# Patient Record
Sex: MALE | Race: WHITE | NOT HISPANIC OR LATINO | URBAN - METROPOLITAN AREA
[De-identification: names, ages, dates, MRNs, and addresses within clinical notes are randomized per-mention and may not be internally consistent; named-entity substitution may affect disease eponyms.]

---

## 2022-01-01 ENCOUNTER — APPOINTMENT (OUTPATIENT)
Dept: GENERAL RADIOLOGY | Facility: CLINIC | Age: 37
DRG: 003 | End: 2022-01-01
Attending: NURSE PRACTITIONER
Payer: COMMERCIAL

## 2022-01-01 ENCOUNTER — APPOINTMENT (OUTPATIENT)
Dept: CARDIOLOGY | Facility: CLINIC | Age: 37
DRG: 003 | End: 2022-01-01
Attending: INTERNAL MEDICINE
Payer: COMMERCIAL

## 2022-01-01 ENCOUNTER — APPOINTMENT (OUTPATIENT)
Dept: CT IMAGING | Facility: CLINIC | Age: 37
DRG: 003 | End: 2022-01-01
Attending: NURSE PRACTITIONER
Payer: COMMERCIAL

## 2022-01-01 ENCOUNTER — HOSPITAL ENCOUNTER (INPATIENT)
Facility: CLINIC | Age: 37
LOS: 9 days | DRG: 003 | End: 2022-04-10
Attending: INTERNAL MEDICINE | Admitting: INTERNAL MEDICINE
Payer: COMMERCIAL

## 2022-01-01 ENCOUNTER — APPOINTMENT (OUTPATIENT)
Dept: NEUROLOGY | Facility: CLINIC | Age: 37
DRG: 003 | End: 2022-01-01
Attending: NURSE PRACTITIONER
Payer: COMMERCIAL

## 2022-01-01 ENCOUNTER — APPOINTMENT (OUTPATIENT)
Dept: GENERAL RADIOLOGY | Facility: CLINIC | Age: 37
DRG: 003 | End: 2022-01-01
Attending: INTERNAL MEDICINE
Payer: COMMERCIAL

## 2022-01-01 ENCOUNTER — APPOINTMENT (OUTPATIENT)
Dept: ULTRASOUND IMAGING | Facility: CLINIC | Age: 37
DRG: 003 | End: 2022-01-01
Attending: NURSE PRACTITIONER
Payer: COMMERCIAL

## 2022-01-01 ENCOUNTER — APPOINTMENT (OUTPATIENT)
Dept: CT IMAGING | Facility: CLINIC | Age: 37
DRG: 003 | End: 2022-01-01
Attending: INTERNAL MEDICINE
Payer: COMMERCIAL

## 2022-01-01 ENCOUNTER — APPOINTMENT (OUTPATIENT)
Dept: CARDIOLOGY | Facility: CLINIC | Age: 37
DRG: 003 | End: 2022-01-01
Attending: PHYSICIAN ASSISTANT
Payer: COMMERCIAL

## 2022-01-01 ENCOUNTER — APPOINTMENT (OUTPATIENT)
Dept: ULTRASOUND IMAGING | Facility: CLINIC | Age: 37
DRG: 003 | End: 2022-01-01
Attending: INTERNAL MEDICINE
Payer: COMMERCIAL

## 2022-01-01 ENCOUNTER — APPOINTMENT (OUTPATIENT)
Dept: GENERAL RADIOLOGY | Facility: CLINIC | Age: 37
DRG: 003 | End: 2022-01-01
Payer: COMMERCIAL

## 2022-01-01 VITALS
HEART RATE: 32 BPM | OXYGEN SATURATION: 78 % | HEIGHT: 71 IN | WEIGHT: 195.11 LBS | RESPIRATION RATE: 10 BRPM | TEMPERATURE: 97.9 F | BODY MASS INDEX: 27.31 KG/M2

## 2022-01-01 DIAGNOSIS — I46.9 CARDIAC ARREST (H): ICD-10-CM

## 2022-01-01 LAB
ABO/RH(D): NORMAL
ACT BLD: 135 SECONDS (ref 74–150)
ACT BLD: 139 SECONDS (ref 74–150)
ACT BLD: 143 SECONDS (ref 74–150)
ACT BLD: 147 SECONDS (ref 74–150)
ACT BLD: 147 SECONDS (ref 74–150)
ACT BLD: 148 SECONDS (ref 74–150)
ACT BLD: 152 SECONDS (ref 74–150)
ACT BLD: 156 SECONDS (ref 74–150)
ACT BLD: 160 SECONDS (ref 74–150)
ACT BLD: 160 SECONDS (ref 74–150)
ACT BLD: 165 SECONDS (ref 74–150)
ACT BLD: 165 SECONDS (ref 74–150)
ACT BLD: 169 SECONDS (ref 74–150)
ACT BLD: 173 SECONDS (ref 74–150)
ACT BLD: 177 SECONDS (ref 74–150)
ACT BLD: 182 SECONDS (ref 74–150)
ACT BLD: 186 SECONDS (ref 74–150)
ACT BLD: 190 SECONDS (ref 74–150)
ACT BLD: 194 SECONDS (ref 74–150)
ACT BLD: 199 SECONDS (ref 74–150)
ACT BLD: 203 SECONDS (ref 74–150)
ACT BLD: 207 SECONDS (ref 74–150)
ACT BLD: 211 SECONDS (ref 74–150)
ACT BLD: 216 SECONDS (ref 74–150)
ACT BLD: 224 SECONDS (ref 74–150)
ACT BLD: 224 SECONDS (ref 74–150)
ACT BLD: 228 SECONDS (ref 74–150)
ACT BLD: 250 SECONDS (ref 74–150)
ACT BLD: 258 SECONDS (ref 74–150)
ALBUMIN SERPL-MCNC: 1.5 G/DL (ref 3.4–5)
ALBUMIN SERPL-MCNC: 1.5 G/DL (ref 3.4–5)
ALBUMIN SERPL-MCNC: 1.6 G/DL (ref 3.4–5)
ALBUMIN SERPL-MCNC: 1.7 G/DL (ref 3.4–5)
ALBUMIN SERPL-MCNC: 1.8 G/DL (ref 3.4–5)
ALBUMIN SERPL-MCNC: 1.9 G/DL (ref 3.4–5)
ALBUMIN SERPL-MCNC: 2 G/DL (ref 3.4–5)
ALBUMIN SERPL-MCNC: 2.1 G/DL (ref 3.4–5)
ALBUMIN SERPL-MCNC: 2.2 G/DL (ref 3.4–5)
ALBUMIN SERPL-MCNC: 2.2 G/DL (ref 3.4–5)
ALBUMIN SERPL-MCNC: 2.3 G/DL (ref 3.4–5)
ALBUMIN SERPL-MCNC: 2.5 G/DL (ref 3.4–5)
ALBUMIN UR-MCNC: 300 MG/DL
ALBUMIN UR-MCNC: 600 MG/DL
ALBUMIN UR-MCNC: ABNORMAL MG/DL
ALP SERPL-CCNC: 102 U/L (ref 40–150)
ALP SERPL-CCNC: 106 U/L (ref 40–150)
ALP SERPL-CCNC: 111 U/L (ref 40–150)
ALP SERPL-CCNC: 112 U/L (ref 40–150)
ALP SERPL-CCNC: 118 U/L (ref 40–150)
ALP SERPL-CCNC: 120 U/L (ref 40–150)
ALP SERPL-CCNC: 122 U/L (ref 40–150)
ALP SERPL-CCNC: 124 U/L (ref 40–150)
ALP SERPL-CCNC: 126 U/L (ref 40–150)
ALP SERPL-CCNC: 126 U/L (ref 40–150)
ALP SERPL-CCNC: 127 U/L (ref 40–150)
ALP SERPL-CCNC: 129 U/L (ref 40–150)
ALP SERPL-CCNC: 134 U/L (ref 40–150)
ALP SERPL-CCNC: 141 U/L (ref 40–150)
ALP SERPL-CCNC: 143 U/L (ref 40–150)
ALP SERPL-CCNC: 145 U/L (ref 40–150)
ALP SERPL-CCNC: 148 U/L (ref 40–150)
ALP SERPL-CCNC: 149 U/L (ref 40–150)
ALP SERPL-CCNC: 150 U/L (ref 40–150)
ALP SERPL-CCNC: 152 U/L (ref 40–150)
ALP SERPL-CCNC: 152 U/L (ref 40–150)
ALP SERPL-CCNC: 154 U/L (ref 40–150)
ALP SERPL-CCNC: 156 U/L (ref 40–150)
ALP SERPL-CCNC: 164 U/L (ref 40–150)
ALP SERPL-CCNC: 165 U/L (ref 40–150)
ALP SERPL-CCNC: 70 U/L (ref 40–150)
ALP SERPL-CCNC: 72 U/L (ref 40–150)
ALP SERPL-CCNC: 73 U/L (ref 40–150)
ALP SERPL-CCNC: 73 U/L (ref 40–150)
ALP SERPL-CCNC: 74 U/L (ref 40–150)
ALP SERPL-CCNC: 75 U/L (ref 40–150)
ALP SERPL-CCNC: 83 U/L (ref 40–150)
ALP SERPL-CCNC: 86 U/L (ref 40–150)
ALP SERPL-CCNC: 88 U/L (ref 40–150)
ALP SERPL-CCNC: 89 U/L (ref 40–150)
ALT SERPL W P-5'-P-CCNC: 1100 U/L (ref 0–70)
ALT SERPL W P-5'-P-CCNC: 1213 U/L (ref 0–70)
ALT SERPL W P-5'-P-CCNC: 1270 U/L (ref 0–70)
ALT SERPL W P-5'-P-CCNC: 1306 U/L (ref 0–70)
ALT SERPL W P-5'-P-CCNC: 1351 U/L (ref 0–70)
ALT SERPL W P-5'-P-CCNC: 1361 U/L (ref 0–70)
ALT SERPL W P-5'-P-CCNC: 1428 U/L (ref 0–70)
ALT SERPL W P-5'-P-CCNC: 1488 U/L (ref 0–70)
ALT SERPL W P-5'-P-CCNC: 153 U/L (ref 0–70)
ALT SERPL W P-5'-P-CCNC: 163 U/L (ref 0–70)
ALT SERPL W P-5'-P-CCNC: 187 U/L (ref 0–70)
ALT SERPL W P-5'-P-CCNC: 193 U/L (ref 0–70)
ALT SERPL W P-5'-P-CCNC: 200 U/L (ref 0–70)
ALT SERPL W P-5'-P-CCNC: 207 U/L (ref 0–70)
ALT SERPL W P-5'-P-CCNC: 268 U/L (ref 0–70)
ALT SERPL W P-5'-P-CCNC: 278 U/L (ref 0–70)
ALT SERPL W P-5'-P-CCNC: 307 U/L (ref 0–70)
ALT SERPL W P-5'-P-CCNC: 330 U/L (ref 0–70)
ALT SERPL W P-5'-P-CCNC: 367 U/L (ref 0–70)
ALT SERPL W P-5'-P-CCNC: 394 U/L (ref 0–70)
ALT SERPL W P-5'-P-CCNC: 437 U/L (ref 0–70)
ALT SERPL W P-5'-P-CCNC: 464 U/L (ref 0–70)
ALT SERPL W P-5'-P-CCNC: 492 U/L (ref 0–70)
ALT SERPL W P-5'-P-CCNC: 500 U/L (ref 0–70)
ALT SERPL W P-5'-P-CCNC: 533 U/L (ref 0–70)
ALT SERPL W P-5'-P-CCNC: 562 U/L (ref 0–70)
ALT SERPL W P-5'-P-CCNC: 616 U/L (ref 0–70)
ALT SERPL W P-5'-P-CCNC: 628 U/L (ref 0–70)
ALT SERPL W P-5'-P-CCNC: 656 U/L (ref 0–70)
ALT SERPL W P-5'-P-CCNC: 690 U/L (ref 0–70)
ALT SERPL W P-5'-P-CCNC: 740 U/L (ref 0–70)
ALT SERPL W P-5'-P-CCNC: 773 U/L (ref 0–70)
ALT SERPL W P-5'-P-CCNC: 822 U/L (ref 0–70)
ALT SERPL W P-5'-P-CCNC: 849 U/L (ref 0–70)
ALT SERPL W P-5'-P-CCNC: 982 U/L (ref 0–70)
AMORPH CRY #/AREA URNS HPF: ABNORMAL /HPF
AMPHETAMINES UR QL SCN: ABNORMAL
AMYLASE SERPL-CCNC: 227 U/L (ref 30–110)
AMYLASE SERPL-CCNC: 231 U/L (ref 30–110)
AMYLASE SERPL-CCNC: 254 U/L (ref 30–110)
AMYLASE SERPL-CCNC: 257 U/L (ref 30–110)
AMYLASE SERPL-CCNC: 262 U/L (ref 30–110)
AMYLASE SERPL-CCNC: 263 U/L (ref 30–110)
AMYLASE SERPL-CCNC: 286 U/L (ref 30–110)
ANION GAP SERPL CALCULATED.3IONS-SCNC: 10 MMOL/L (ref 3–14)
ANION GAP SERPL CALCULATED.3IONS-SCNC: 11 MMOL/L (ref 3–14)
ANION GAP SERPL CALCULATED.3IONS-SCNC: 12 MMOL/L (ref 3–14)
ANION GAP SERPL CALCULATED.3IONS-SCNC: 13 MMOL/L (ref 3–14)
ANION GAP SERPL CALCULATED.3IONS-SCNC: 14 MMOL/L (ref 3–14)
ANION GAP SERPL CALCULATED.3IONS-SCNC: 18 MMOL/L (ref 3–14)
ANION GAP SERPL CALCULATED.3IONS-SCNC: 28 MMOL/L (ref 3–14)
ANION GAP SERPL CALCULATED.3IONS-SCNC: 5 MMOL/L (ref 3–14)
ANION GAP SERPL CALCULATED.3IONS-SCNC: 5 MMOL/L (ref 3–14)
ANION GAP SERPL CALCULATED.3IONS-SCNC: 6 MMOL/L (ref 3–14)
ANION GAP SERPL CALCULATED.3IONS-SCNC: 6 MMOL/L (ref 3–14)
ANION GAP SERPL CALCULATED.3IONS-SCNC: 7 MMOL/L (ref 3–14)
ANION GAP SERPL CALCULATED.3IONS-SCNC: 8 MMOL/L (ref 3–14)
ANION GAP SERPL CALCULATED.3IONS-SCNC: 9 MMOL/L (ref 3–14)
ANTIBODY SCREEN: NEGATIVE
APAP SERPL-MCNC: 5 MG/L (ref 10–30)
APPEARANCE UR: ABNORMAL
APPEARANCE UR: ABNORMAL
APPEARANCE UR: CLEAR
APTT PPP: 29 SECONDS (ref 22–38)
APTT PPP: 31 SECONDS (ref 22–38)
APTT PPP: 34 SECONDS (ref 22–38)
APTT PPP: 35 SECONDS (ref 22–38)
APTT PPP: 37 SECONDS (ref 22–38)
APTT PPP: 38 SECONDS (ref 22–38)
APTT PPP: 38 SECONDS (ref 22–38)
APTT PPP: 39 SECONDS (ref 22–38)
APTT PPP: 40 SECONDS (ref 22–38)
APTT PPP: 40 SECONDS (ref 22–38)
APTT PPP: 41 SECONDS (ref 22–38)
APTT PPP: 42 SECONDS (ref 22–38)
APTT PPP: 43 SECONDS (ref 22–38)
APTT PPP: 43 SECONDS (ref 22–38)
APTT PPP: 44 SECONDS (ref 22–38)
APTT PPP: 44 SECONDS (ref 22–38)
APTT PPP: 45 SECONDS (ref 22–38)
APTT PPP: 47 SECONDS (ref 22–38)
APTT PPP: 48 SECONDS (ref 22–38)
APTT PPP: 52 SECONDS (ref 22–38)
APTT PPP: 53 SECONDS (ref 22–38)
APTT PPP: 58 SECONDS (ref 22–38)
APTT PPP: 93 SECONDS (ref 22–38)
APTT PPP: 95 SECONDS (ref 22–38)
APTT PPP: >240 SECONDS (ref 22–38)
AST SERPL W P-5'-P-CCNC: 1045 U/L (ref 0–45)
AST SERPL W P-5'-P-CCNC: 108 U/L (ref 0–45)
AST SERPL W P-5'-P-CCNC: 109 U/L (ref 0–45)
AST SERPL W P-5'-P-CCNC: 1111 U/L (ref 0–45)
AST SERPL W P-5'-P-CCNC: 113 U/L (ref 0–45)
AST SERPL W P-5'-P-CCNC: 1186 U/L (ref 0–45)
AST SERPL W P-5'-P-CCNC: 130 U/L (ref 0–45)
AST SERPL W P-5'-P-CCNC: 1375 U/L (ref 0–45)
AST SERPL W P-5'-P-CCNC: 1529 U/L (ref 0–45)
AST SERPL W P-5'-P-CCNC: 168 U/L (ref 0–45)
AST SERPL W P-5'-P-CCNC: 1704 U/L (ref 0–45)
AST SERPL W P-5'-P-CCNC: 1891 U/L (ref 0–45)
AST SERPL W P-5'-P-CCNC: 1934 U/L (ref 0–45)
AST SERPL W P-5'-P-CCNC: 194 U/L (ref 0–45)
AST SERPL W P-5'-P-CCNC: 2014 U/L (ref 0–45)
AST SERPL W P-5'-P-CCNC: 2111 U/L (ref 0–45)
AST SERPL W P-5'-P-CCNC: 2116 U/L (ref 0–45)
AST SERPL W P-5'-P-CCNC: 223 U/L (ref 0–45)
AST SERPL W P-5'-P-CCNC: 2324 U/L (ref 0–45)
AST SERPL W P-5'-P-CCNC: 254 U/L (ref 0–45)
AST SERPL W P-5'-P-CCNC: 287 U/L (ref 0–45)
AST SERPL W P-5'-P-CCNC: 345 U/L (ref 0–45)
AST SERPL W P-5'-P-CCNC: 398 U/L (ref 0–45)
AST SERPL W P-5'-P-CCNC: 444 U/L (ref 0–45)
AST SERPL W P-5'-P-CCNC: 490 U/L (ref 0–45)
AST SERPL W P-5'-P-CCNC: 559 U/L (ref 0–45)
AST SERPL W P-5'-P-CCNC: 650 U/L (ref 0–45)
AST SERPL W P-5'-P-CCNC: 652 U/L (ref 0–45)
AST SERPL W P-5'-P-CCNC: 728 U/L (ref 0–45)
AST SERPL W P-5'-P-CCNC: 819 U/L (ref 0–45)
AST SERPL W P-5'-P-CCNC: 86 U/L (ref 0–45)
AST SERPL W P-5'-P-CCNC: 893 U/L (ref 0–45)
AST SERPL W P-5'-P-CCNC: 95 U/L (ref 0–45)
AST SERPL W P-5'-P-CCNC: 983 U/L (ref 0–45)
AST SERPL W P-5'-P-CCNC: ABNORMAL U/L
AT III ACT/NOR PPP CHRO: 41 % (ref 85–135)
AT III ACT/NOR PPP CHRO: 48 % (ref 85–135)
AT III ACT/NOR PPP CHRO: 48 % (ref 85–135)
AT III ACT/NOR PPP CHRO: 54 % (ref 85–135)
AT III ACT/NOR PPP CHRO: 55 % (ref 85–135)
AT III ACT/NOR PPP CHRO: 65 % (ref 85–135)
AT III ACT/NOR PPP CHRO: 68 % (ref 85–135)
AT III ACT/NOR PPP CHRO: 77 % (ref 85–135)
AT III ACT/NOR PPP CHRO: 78 % (ref 85–135)
ATRIAL RATE - MUSE: 100 BPM
ATRIAL RATE - MUSE: 59 BPM
ATRIAL RATE - MUSE: 81 BPM
ATRIAL RATE - MUSE: 88 BPM
ATRIAL RATE - MUSE: 94 BPM
BACTERIA BLD CULT: NO GROWTH
BACTERIA SPT CULT: ABNORMAL
BARBITURATES UR QL: ABNORMAL
BASE EXCESS BLDA CALC-SCNC: -0.1 MMOL/L (ref -9–1.8)
BASE EXCESS BLDA CALC-SCNC: -0.2 MMOL/L (ref -9–1.8)
BASE EXCESS BLDA CALC-SCNC: -0.3 MMOL/L (ref -9–1.8)
BASE EXCESS BLDA CALC-SCNC: -0.4 MMOL/L (ref -9–1.8)
BASE EXCESS BLDA CALC-SCNC: -0.5 MMOL/L (ref -9–1.8)
BASE EXCESS BLDA CALC-SCNC: -0.6 MMOL/L (ref -9–1.8)
BASE EXCESS BLDA CALC-SCNC: -0.7 MMOL/L (ref -9–1.8)
BASE EXCESS BLDA CALC-SCNC: -0.8 MMOL/L (ref -9–1.8)
BASE EXCESS BLDA CALC-SCNC: -0.9 MMOL/L (ref -9–1.8)
BASE EXCESS BLDA CALC-SCNC: -0.9 MMOL/L (ref -9–1.8)
BASE EXCESS BLDA CALC-SCNC: -1.2 MMOL/L (ref -9–1.8)
BASE EXCESS BLDA CALC-SCNC: -1.2 MMOL/L (ref -9–1.8)
BASE EXCESS BLDA CALC-SCNC: -1.3 MMOL/L (ref -9–1.8)
BASE EXCESS BLDA CALC-SCNC: -1.3 MMOL/L (ref -9–1.8)
BASE EXCESS BLDA CALC-SCNC: -1.4 MMOL/L (ref -9–1.8)
BASE EXCESS BLDA CALC-SCNC: -1.5 MMOL/L (ref -9–1.8)
BASE EXCESS BLDA CALC-SCNC: -1.6 MMOL/L (ref -9–1.8)
BASE EXCESS BLDA CALC-SCNC: -1.7 MMOL/L (ref -9–1.8)
BASE EXCESS BLDA CALC-SCNC: -1.8 MMOL/L (ref -9–1.8)
BASE EXCESS BLDA CALC-SCNC: -1.9 MMOL/L (ref -9–1.8)
BASE EXCESS BLDA CALC-SCNC: -11.1 MMOL/L (ref -9–1.8)
BASE EXCESS BLDA CALC-SCNC: -11.7 MMOL/L (ref -9–1.8)
BASE EXCESS BLDA CALC-SCNC: -12 MMOL/L (ref -9–1.8)
BASE EXCESS BLDA CALC-SCNC: -12 MMOL/L (ref -9–1.8)
BASE EXCESS BLDA CALC-SCNC: -12.1 MMOL/L (ref -9–1.8)
BASE EXCESS BLDA CALC-SCNC: -12.2 MMOL/L (ref -9–1.8)
BASE EXCESS BLDA CALC-SCNC: -12.3 MMOL/L (ref -9–1.8)
BASE EXCESS BLDA CALC-SCNC: -15.4 MMOL/L (ref -9.6–2)
BASE EXCESS BLDA CALC-SCNC: -2 MMOL/L (ref -9–1.8)
BASE EXCESS BLDA CALC-SCNC: -2 MMOL/L (ref -9–1.8)
BASE EXCESS BLDA CALC-SCNC: -2.2 MMOL/L (ref -9–1.8)
BASE EXCESS BLDA CALC-SCNC: -2.2 MMOL/L (ref -9–1.8)
BASE EXCESS BLDA CALC-SCNC: -2.3 MMOL/L (ref -9–1.8)
BASE EXCESS BLDA CALC-SCNC: -2.4 MMOL/L (ref -9–1.8)
BASE EXCESS BLDA CALC-SCNC: -2.4 MMOL/L (ref -9–1.8)
BASE EXCESS BLDA CALC-SCNC: -2.5 MMOL/L (ref -9–1.8)
BASE EXCESS BLDA CALC-SCNC: -2.5 MMOL/L (ref -9–1.8)
BASE EXCESS BLDA CALC-SCNC: -2.6 MMOL/L (ref -9–1.8)
BASE EXCESS BLDA CALC-SCNC: -2.7 MMOL/L (ref -9.6–2)
BASE EXCESS BLDA CALC-SCNC: -2.8 MMOL/L (ref -9–1.8)
BASE EXCESS BLDA CALC-SCNC: -20.5 MMOL/L (ref -9.6–2)
BASE EXCESS BLDA CALC-SCNC: -3 MMOL/L (ref -9–1.8)
BASE EXCESS BLDA CALC-SCNC: -3 MMOL/L (ref -9–1.8)
BASE EXCESS BLDA CALC-SCNC: -3.2 MMOL/L (ref -9–1.8)
BASE EXCESS BLDA CALC-SCNC: -3.4 MMOL/L (ref -9–1.8)
BASE EXCESS BLDA CALC-SCNC: -3.4 MMOL/L (ref -9–1.8)
BASE EXCESS BLDA CALC-SCNC: -3.6 MMOL/L (ref -9–1.8)
BASE EXCESS BLDA CALC-SCNC: -3.7 MMOL/L (ref -9–1.8)
BASE EXCESS BLDA CALC-SCNC: -3.8 MMOL/L (ref -9–1.8)
BASE EXCESS BLDA CALC-SCNC: -3.9 MMOL/L (ref -9–1.8)
BASE EXCESS BLDA CALC-SCNC: -4.1 MMOL/L (ref -9–1.8)
BASE EXCESS BLDA CALC-SCNC: -4.3 MMOL/L (ref -9–1.8)
BASE EXCESS BLDA CALC-SCNC: -4.4 MMOL/L (ref -9–1.8)
BASE EXCESS BLDA CALC-SCNC: -4.5 MMOL/L (ref -9–1.8)
BASE EXCESS BLDA CALC-SCNC: -4.6 MMOL/L (ref -9–1.8)
BASE EXCESS BLDA CALC-SCNC: -4.6 MMOL/L (ref -9–1.8)
BASE EXCESS BLDA CALC-SCNC: -4.7 MMOL/L (ref -9–1.8)
BASE EXCESS BLDA CALC-SCNC: -4.8 MMOL/L (ref -9–1.8)
BASE EXCESS BLDA CALC-SCNC: -4.8 MMOL/L (ref -9–1.8)
BASE EXCESS BLDA CALC-SCNC: -4.9 MMOL/L (ref -9–1.8)
BASE EXCESS BLDA CALC-SCNC: -4.9 MMOL/L (ref -9–1.8)
BASE EXCESS BLDA CALC-SCNC: -5 MMOL/L (ref -9–1.8)
BASE EXCESS BLDA CALC-SCNC: -5.1 MMOL/L (ref -9–1.8)
BASE EXCESS BLDA CALC-SCNC: -5.2 MMOL/L (ref -9–1.8)
BASE EXCESS BLDA CALC-SCNC: -5.3 MMOL/L (ref -9–1.8)
BASE EXCESS BLDA CALC-SCNC: -5.5 MMOL/L (ref -9–1.8)
BASE EXCESS BLDA CALC-SCNC: -5.6 MMOL/L (ref -9–1.8)
BASE EXCESS BLDA CALC-SCNC: -5.8 MMOL/L (ref -9–1.8)
BASE EXCESS BLDA CALC-SCNC: -5.9 MMOL/L (ref -9–1.8)
BASE EXCESS BLDA CALC-SCNC: -6 MMOL/L (ref -9–1.8)
BASE EXCESS BLDA CALC-SCNC: -6 MMOL/L (ref -9–1.8)
BASE EXCESS BLDA CALC-SCNC: -6.1 MMOL/L (ref -9–1.8)
BASE EXCESS BLDA CALC-SCNC: -6.2 MMOL/L (ref -9–1.8)
BASE EXCESS BLDA CALC-SCNC: -6.3 MMOL/L (ref -9–1.8)
BASE EXCESS BLDA CALC-SCNC: -6.3 MMOL/L (ref -9–1.8)
BASE EXCESS BLDA CALC-SCNC: -6.4 MMOL/L (ref -9–1.8)
BASE EXCESS BLDA CALC-SCNC: -6.5 MMOL/L (ref -9–1.8)
BASE EXCESS BLDA CALC-SCNC: -6.5 MMOL/L (ref -9–1.8)
BASE EXCESS BLDA CALC-SCNC: -6.6 MMOL/L (ref -9–1.8)
BASE EXCESS BLDA CALC-SCNC: -6.7 MMOL/L (ref -9–1.8)
BASE EXCESS BLDA CALC-SCNC: -6.9 MMOL/L (ref -9–1.8)
BASE EXCESS BLDA CALC-SCNC: -7 MMOL/L (ref -9–1.8)
BASE EXCESS BLDA CALC-SCNC: -7.3 MMOL/L (ref -9–1.8)
BASE EXCESS BLDA CALC-SCNC: -7.4 MMOL/L (ref -9–1.8)
BASE EXCESS BLDA CALC-SCNC: -7.7 MMOL/L (ref -9–1.8)
BASE EXCESS BLDA CALC-SCNC: -7.8 MMOL/L (ref -9–1.8)
BASE EXCESS BLDA CALC-SCNC: -7.9 MMOL/L (ref -9–1.8)
BASE EXCESS BLDA CALC-SCNC: -8 MMOL/L (ref -9–1.8)
BASE EXCESS BLDA CALC-SCNC: -8.3 MMOL/L (ref -9–1.8)
BASE EXCESS BLDA CALC-SCNC: -8.5 MMOL/L (ref -9–1.8)
BASE EXCESS BLDA CALC-SCNC: -8.7 MMOL/L (ref -9.6–2)
BASE EXCESS BLDA CALC-SCNC: -8.7 MMOL/L (ref -9–1.8)
BASE EXCESS BLDA CALC-SCNC: -9.1 MMOL/L (ref -9.6–2)
BASE EXCESS BLDA CALC-SCNC: -9.9 MMOL/L (ref -9–1.8)
BASE EXCESS BLDA CALC-SCNC: 0 MMOL/L (ref -9–1.8)
BASE EXCESS BLDA CALC-SCNC: 0 MMOL/L (ref -9–1.8)
BASE EXCESS BLDA CALC-SCNC: 0.3 MMOL/L (ref -9–1.8)
BASE EXCESS BLDA CALC-SCNC: 0.3 MMOL/L (ref -9–1.8)
BASE EXCESS BLDA CALC-SCNC: 0.8 MMOL/L (ref -9–1.8)
BASE EXCESS BLDV CALC-SCNC: -0.1 MMOL/L (ref -7.7–1.9)
BASE EXCESS BLDV CALC-SCNC: -0.2 MMOL/L (ref -7.7–1.9)
BASE EXCESS BLDV CALC-SCNC: -0.4 MMOL/L (ref -7.7–1.9)
BASE EXCESS BLDV CALC-SCNC: -0.8 MMOL/L (ref -7.7–1.9)
BASE EXCESS BLDV CALC-SCNC: -0.9 MMOL/L (ref -7.7–1.9)
BASE EXCESS BLDV CALC-SCNC: -1.4 MMOL/L (ref -7.7–1.9)
BASE EXCESS BLDV CALC-SCNC: -11.4 MMOL/L (ref -7.7–1.9)
BASE EXCESS BLDV CALC-SCNC: -2.2 MMOL/L (ref -7.7–1.9)
BASE EXCESS BLDV CALC-SCNC: -2.4 MMOL/L (ref -7.7–1.9)
BASE EXCESS BLDV CALC-SCNC: -2.6 MMOL/L (ref -7.7–1.9)
BASE EXCESS BLDV CALC-SCNC: -3.1 MMOL/L (ref -7.7–1.9)
BASE EXCESS BLDV CALC-SCNC: -3.2 MMOL/L (ref -7.7–1.9)
BASE EXCESS BLDV CALC-SCNC: -3.4 MMOL/L (ref -7.7–1.9)
BASE EXCESS BLDV CALC-SCNC: -3.5 MMOL/L (ref -7.7–1.9)
BASE EXCESS BLDV CALC-SCNC: -3.5 MMOL/L (ref -7.7–1.9)
BASE EXCESS BLDV CALC-SCNC: -3.8 MMOL/L (ref -7.7–1.9)
BASE EXCESS BLDV CALC-SCNC: -4 MMOL/L (ref -7.7–1.9)
BASE EXCESS BLDV CALC-SCNC: -4 MMOL/L (ref -7.7–1.9)
BASE EXCESS BLDV CALC-SCNC: -4.1 MMOL/L (ref -7.7–1.9)
BASE EXCESS BLDV CALC-SCNC: -4.2 MMOL/L (ref -7.7–1.9)
BASE EXCESS BLDV CALC-SCNC: -4.3 MMOL/L (ref -7.7–1.9)
BASE EXCESS BLDV CALC-SCNC: -4.3 MMOL/L (ref -7.7–1.9)
BASE EXCESS BLDV CALC-SCNC: -4.4 MMOL/L (ref -7.7–1.9)
BASE EXCESS BLDV CALC-SCNC: -4.4 MMOL/L (ref -7.7–1.9)
BASE EXCESS BLDV CALC-SCNC: -4.6 MMOL/L (ref -7.7–1.9)
BASE EXCESS BLDV CALC-SCNC: -4.6 MMOL/L (ref -7.7–1.9)
BASE EXCESS BLDV CALC-SCNC: -4.7 MMOL/L (ref -7.7–1.9)
BASE EXCESS BLDV CALC-SCNC: -4.8 MMOL/L (ref -7.7–1.9)
BASE EXCESS BLDV CALC-SCNC: -4.8 MMOL/L (ref -7.7–1.9)
BASE EXCESS BLDV CALC-SCNC: -5 MMOL/L (ref -7.7–1.9)
BASE EXCESS BLDV CALC-SCNC: -5.4 MMOL/L (ref -7.7–1.9)
BASE EXCESS BLDV CALC-SCNC: -5.5 MMOL/L (ref -7.7–1.9)
BASE EXCESS BLDV CALC-SCNC: -5.6 MMOL/L (ref -7.7–1.9)
BASE EXCESS BLDV CALC-SCNC: -5.6 MMOL/L (ref -7.7–1.9)
BASE EXCESS BLDV CALC-SCNC: -5.7 MMOL/L (ref -7.7–1.9)
BASE EXCESS BLDV CALC-SCNC: -6 MMOL/L (ref -7.7–1.9)
BASE EXCESS BLDV CALC-SCNC: -6.1 MMOL/L (ref -7.7–1.9)
BASE EXCESS BLDV CALC-SCNC: -6.2 MMOL/L (ref -7.7–1.9)
BASE EXCESS BLDV CALC-SCNC: -6.3 MMOL/L (ref -7.7–1.9)
BASE EXCESS BLDV CALC-SCNC: -6.5 MMOL/L (ref -7.7–1.9)
BASE EXCESS BLDV CALC-SCNC: -6.6 MMOL/L (ref -7.7–1.9)
BASE EXCESS BLDV CALC-SCNC: -6.6 MMOL/L (ref -7.7–1.9)
BASE EXCESS BLDV CALC-SCNC: -6.7 MMOL/L (ref -7.7–1.9)
BASE EXCESS BLDV CALC-SCNC: -6.7 MMOL/L (ref -7.7–1.9)
BASE EXCESS BLDV CALC-SCNC: -7.2 MMOL/L (ref -7.7–1.9)
BASE EXCESS BLDV CALC-SCNC: -7.3 MMOL/L (ref -7.7–1.9)
BASE EXCESS BLDV CALC-SCNC: -7.5 MMOL/L (ref -7.7–1.9)
BASE EXCESS BLDV CALC-SCNC: -7.5 MMOL/L (ref -7.7–1.9)
BASE EXCESS BLDV CALC-SCNC: -7.7 MMOL/L (ref -7.7–1.9)
BASE EXCESS BLDV CALC-SCNC: -8.3 MMOL/L (ref -7.7–1.9)
BASE EXCESS BLDV CALC-SCNC: 0.4 MMOL/L (ref -7.7–1.9)
BASE EXCESS BLDV CALC-SCNC: 0.9 MMOL/L (ref -7.7–1.9)
BASOPHILS # BLD AUTO: 0 10E3/UL (ref 0–0.2)
BASOPHILS # BLD MANUAL: 0 10E3/UL (ref 0–0.2)
BASOPHILS NFR BLD AUTO: 0 %
BASOPHILS NFR BLD MANUAL: 0 %
BENZODIAZ UR QL: ABNORMAL
BILIRUB DIRECT SERPL-MCNC: 0.2 MG/DL (ref 0–0.2)
BILIRUB DIRECT SERPL-MCNC: 0.2 MG/DL (ref 0–0.2)
BILIRUB DIRECT SERPL-MCNC: 0.3 MG/DL (ref 0–0.2)
BILIRUB DIRECT SERPL-MCNC: 0.3 MG/DL (ref 0–0.2)
BILIRUB DIRECT SERPL-MCNC: 0.4 MG/DL (ref 0–0.2)
BILIRUB DIRECT SERPL-MCNC: 0.4 MG/DL (ref 0–0.2)
BILIRUB DIRECT SERPL-MCNC: 0.5 MG/DL (ref 0–0.2)
BILIRUB DIRECT SERPL-MCNC: 0.8 MG/DL (ref 0–0.2)
BILIRUB SERPL-MCNC: 0.5 MG/DL (ref 0.2–1.3)
BILIRUB SERPL-MCNC: 0.6 MG/DL (ref 0.2–1.3)
BILIRUB SERPL-MCNC: 0.7 MG/DL (ref 0.2–1.3)
BILIRUB SERPL-MCNC: 0.8 MG/DL (ref 0.2–1.3)
BILIRUB SERPL-MCNC: 0.8 MG/DL (ref 0.2–1.3)
BILIRUB SERPL-MCNC: 0.9 MG/DL (ref 0.2–1.3)
BILIRUB SERPL-MCNC: 1 MG/DL (ref 0.2–1.3)
BILIRUB SERPL-MCNC: 1.1 MG/DL (ref 0.2–1.3)
BILIRUB SERPL-MCNC: 1.2 MG/DL (ref 0.2–1.3)
BILIRUB SERPL-MCNC: 1.2 MG/DL (ref 0.2–1.3)
BILIRUB SERPL-MCNC: 1.3 MG/DL (ref 0.2–1.3)
BILIRUB SERPL-MCNC: 1.3 MG/DL (ref 0.2–1.3)
BILIRUB SERPL-MCNC: 1.4 MG/DL (ref 0.2–1.3)
BILIRUB SERPL-MCNC: 1.6 MG/DL (ref 0.2–1.3)
BILIRUB SERPL-MCNC: 1.6 MG/DL (ref 0.2–1.3)
BILIRUB SERPL-MCNC: 1.7 MG/DL (ref 0.2–1.3)
BILIRUB SERPL-MCNC: 1.7 MG/DL (ref 0.2–1.3)
BILIRUB SERPL-MCNC: 1.8 MG/DL (ref 0.2–1.3)
BILIRUB SERPL-MCNC: 2.4 MG/DL (ref 0.2–1.3)
BILIRUB UR QL STRIP: NEGATIVE
BLD PROD TYP BPU: NORMAL
BLOOD COMPONENT TYPE: NORMAL
BUN SERPL-MCNC: 101 MG/DL (ref 7–30)
BUN SERPL-MCNC: 104 MG/DL (ref 7–30)
BUN SERPL-MCNC: 108 MG/DL (ref 7–30)
BUN SERPL-MCNC: 111 MG/DL (ref 7–30)
BUN SERPL-MCNC: 114 MG/DL (ref 7–30)
BUN SERPL-MCNC: 114 MG/DL (ref 7–30)
BUN SERPL-MCNC: 14 MG/DL (ref 7–30)
BUN SERPL-MCNC: 22 MG/DL (ref 7–30)
BUN SERPL-MCNC: 30 MG/DL (ref 7–30)
BUN SERPL-MCNC: 36 MG/DL (ref 7–30)
BUN SERPL-MCNC: 36 MG/DL (ref 7–30)
BUN SERPL-MCNC: 39 MG/DL (ref 7–30)
BUN SERPL-MCNC: 41 MG/DL (ref 7–30)
BUN SERPL-MCNC: 43 MG/DL (ref 7–30)
BUN SERPL-MCNC: 47 MG/DL (ref 7–30)
BUN SERPL-MCNC: 50 MG/DL (ref 7–30)
BUN SERPL-MCNC: 50 MG/DL (ref 7–30)
BUN SERPL-MCNC: 52 MG/DL (ref 7–30)
BUN SERPL-MCNC: 52 MG/DL (ref 7–30)
BUN SERPL-MCNC: 53 MG/DL (ref 7–30)
BUN SERPL-MCNC: 55 MG/DL (ref 7–30)
BUN SERPL-MCNC: 55 MG/DL (ref 7–30)
BUN SERPL-MCNC: 56 MG/DL (ref 7–30)
BUN SERPL-MCNC: 57 MG/DL (ref 7–30)
BUN SERPL-MCNC: 58 MG/DL (ref 7–30)
BUN SERPL-MCNC: 58 MG/DL (ref 7–30)
BUN SERPL-MCNC: 60 MG/DL (ref 7–30)
BUN SERPL-MCNC: 60 MG/DL (ref 7–30)
BUN SERPL-MCNC: 62 MG/DL (ref 7–30)
BUN SERPL-MCNC: 62 MG/DL (ref 7–30)
BUN SERPL-MCNC: 64 MG/DL (ref 7–30)
BUN SERPL-MCNC: 64 MG/DL (ref 7–30)
BUN SERPL-MCNC: 65 MG/DL (ref 7–30)
BUN SERPL-MCNC: 65 MG/DL (ref 7–30)
BUN SERPL-MCNC: 67 MG/DL (ref 7–30)
BUN SERPL-MCNC: 70 MG/DL (ref 7–30)
BUN SERPL-MCNC: 72 MG/DL (ref 7–30)
BUN SERPL-MCNC: 74 MG/DL (ref 7–30)
BUN SERPL-MCNC: 74 MG/DL (ref 7–30)
BUN SERPL-MCNC: 75 MG/DL (ref 7–30)
BUN SERPL-MCNC: 75 MG/DL (ref 7–30)
BUN SERPL-MCNC: 77 MG/DL (ref 7–30)
BUN SERPL-MCNC: 80 MG/DL (ref 7–30)
BUN SERPL-MCNC: 81 MG/DL (ref 7–30)
BUN SERPL-MCNC: 81 MG/DL (ref 7–30)
BUN SERPL-MCNC: 82 MG/DL (ref 7–30)
BUN SERPL-MCNC: 83 MG/DL (ref 7–30)
BUN SERPL-MCNC: 85 MG/DL (ref 7–30)
BUN SERPL-MCNC: 86 MG/DL (ref 7–30)
BUN SERPL-MCNC: 86 MG/DL (ref 7–30)
BUN SERPL-MCNC: 88 MG/DL (ref 7–30)
BUN SERPL-MCNC: 90 MG/DL (ref 7–30)
BUN SERPL-MCNC: 91 MG/DL (ref 7–30)
BUN SERPL-MCNC: 96 MG/DL (ref 7–30)
BUN SERPL-MCNC: 98 MG/DL (ref 7–30)
BUN SERPL-MCNC: 98 MG/DL (ref 7–30)
CA-I BLD-MCNC: 3.7 MG/DL (ref 4.4–5.2)
CA-I BLD-MCNC: 3.7 MG/DL (ref 4.4–5.2)
CA-I BLD-MCNC: 3.9 MG/DL (ref 4.4–5.2)
CA-I BLD-MCNC: 4.1 MG/DL (ref 4.4–5.2)
CA-I BLD-MCNC: 4.1 MG/DL (ref 4.4–5.2)
CA-I BLD-MCNC: 4.2 MG/DL (ref 4.4–5.2)
CA-I BLD-MCNC: 4.3 MG/DL (ref 4.4–5.2)
CA-I BLD-MCNC: 4.4 MG/DL (ref 4.4–5.2)
CA-I BLD-MCNC: 4.5 MG/DL (ref 4.4–5.2)
CA-I BLD-MCNC: 4.6 MG/DL (ref 4.4–5.2)
CA-I BLD-MCNC: 4.6 MG/DL (ref 4.4–5.2)
CA-I BLD-MCNC: 5.9 MG/DL (ref 4.4–5.2)
CALCIUM SERPL-MCNC: 6.9 MG/DL (ref 8.5–10.1)
CALCIUM SERPL-MCNC: 7 MG/DL (ref 8.5–10.1)
CALCIUM SERPL-MCNC: 7 MG/DL (ref 8.5–10.1)
CALCIUM SERPL-MCNC: 7.1 MG/DL (ref 8.5–10.1)
CALCIUM SERPL-MCNC: 7.1 MG/DL (ref 8.5–10.1)
CALCIUM SERPL-MCNC: 7.2 MG/DL (ref 8.5–10.1)
CALCIUM SERPL-MCNC: 7.3 MG/DL (ref 8.5–10.1)
CALCIUM SERPL-MCNC: 7.4 MG/DL (ref 8.5–10.1)
CALCIUM SERPL-MCNC: 7.5 MG/DL (ref 8.5–10.1)
CALCIUM SERPL-MCNC: 7.6 MG/DL (ref 8.5–10.1)
CALCIUM SERPL-MCNC: 7.7 MG/DL (ref 8.5–10.1)
CALCIUM SERPL-MCNC: 7.8 MG/DL (ref 8.5–10.1)
CALCIUM SERPL-MCNC: 7.9 MG/DL (ref 8.5–10.1)
CALCIUM SERPL-MCNC: 8 MG/DL (ref 8.5–10.1)
CALCIUM SERPL-MCNC: 8.2 MG/DL (ref 8.5–10.1)
CALCIUM SERPL-MCNC: 8.4 MG/DL (ref 8.5–10.1)
CALCIUM SERPL-MCNC: 8.4 MG/DL (ref 8.5–10.1)
CANNABINOIDS UR QL SCN: ABNORMAL
CF REDUC 30M P MA P HEP LENFR BLD TEG: 0 % (ref 0–8)
CF REDUC 30M P MA P HEP LENFR BLD TEG: 0 % (ref 0–8)
CF REDUC 30M P MA P HEP LENFR BLD TEG: 28.4 % (ref 0–8)
CF REDUC 60M P MA P HEPASE LENFR BLD TEG: 0 % (ref 0–15)
CF REDUC 60M P MA P HEPASE LENFR BLD TEG: 0.5 % (ref 0–15)
CF REDUC 60M P MA P HEPASE LENFR BLD TEG: 62.4 % (ref 0–15)
CFT P HPASE BLD TEG: 0.8 MINUTE (ref 1–3)
CFT P HPASE BLD TEG: 1.2 MINUTE (ref 1–3)
CFT P HPASE BLD TEG: 1.5 MINUTE (ref 1–3)
CHLORIDE BLD-SCNC: 108 MMOL/L (ref 94–109)
CHLORIDE BLD-SCNC: 109 MMOL/L (ref 94–109)
CHLORIDE BLD-SCNC: 110 MMOL/L (ref 94–109)
CHLORIDE BLD-SCNC: 111 MMOL/L (ref 94–109)
CHLORIDE BLD-SCNC: 112 MMOL/L (ref 94–109)
CHLORIDE BLD-SCNC: 113 MMOL/L (ref 94–109)
CHLORIDE BLD-SCNC: 114 MMOL/L (ref 94–109)
CHLORIDE BLD-SCNC: 115 MMOL/L (ref 94–109)
CHLORIDE BLD-SCNC: 116 MMOL/L (ref 94–109)
CHLORIDE BLD-SCNC: 116 MMOL/L (ref 94–109)
CHLORIDE BLD-SCNC: 117 MMOL/L (ref 94–109)
CHLORIDE BLD-SCNC: 117 MMOL/L (ref 94–109)
CHLORIDE BLD-SCNC: 118 MMOL/L (ref 94–109)
CHLORIDE BLD-SCNC: 119 MMOL/L (ref 94–109)
CHLORIDE BLD-SCNC: 120 MMOL/L (ref 94–109)
CHLORIDE BLD-SCNC: 122 MMOL/L (ref 94–109)
CHLORIDE BLD-SCNC: 123 MMOL/L (ref 94–109)
CHLORIDE BLD-SCNC: 124 MMOL/L (ref 94–109)
CI (COAGULATION INDEX)(Z): 1.1 (ref -3–3)
CI (COAGULATION INDEX)(Z): 1.4 (ref -3–3)
CI (COAGULATION INDEX)(Z): 2.1 (ref -3–3)
CLOT ANGLE P HPASE BLD TEG: 70.3 DEGREES (ref 53–72)
CLOT ANGLE P HPASE BLD TEG: 72.6 DEGREES (ref 53–72)
CLOT ANGLE P HPASE BLD TEG: 75.8 DEGREES (ref 53–72)
CLOT INIT P HPASE BLD TEG: 3.8 MINUTE (ref 5–10)
CLOT INIT P HPASE BLD TEG: 5.7 MINUTE (ref 5–10)
CLOT INIT P HPASE BLD TEG: 6.8 MINUTE (ref 5–10)
CLOT STRENGTH P HPASE BLD TEG: 10.2 KD/SC (ref 4.5–11)
CLOT STRENGTH P HPASE BLD TEG: 6.5 KD/SC (ref 4.5–11)
CLOT STRENGTH P HPASE BLD TEG: 9.9 KD/SC (ref 4.5–11)
CO2 SERPL-SCNC: 12 MMOL/L (ref 20–32)
CO2 SERPL-SCNC: 16 MMOL/L (ref 20–32)
CO2 SERPL-SCNC: 17 MMOL/L (ref 20–32)
CO2 SERPL-SCNC: 18 MMOL/L (ref 20–32)
CO2 SERPL-SCNC: 19 MMOL/L (ref 20–32)
CO2 SERPL-SCNC: 20 MMOL/L (ref 20–32)
CO2 SERPL-SCNC: 21 MMOL/L (ref 20–32)
CO2 SERPL-SCNC: 22 MMOL/L (ref 20–32)
CO2 SERPL-SCNC: 23 MMOL/L (ref 20–32)
CO2 SERPL-SCNC: 24 MMOL/L (ref 20–32)
CO2 SERPL-SCNC: 25 MMOL/L (ref 20–32)
CO2 SERPL-SCNC: 26 MMOL/L (ref 20–32)
CO2 SERPL-SCNC: 26 MMOL/L (ref 20–32)
COCAINE UR QL: ABNORMAL
CODING SYSTEM: NORMAL
COLOR UR AUTO: COLORLESS
COLOR UR AUTO: YELLOW
COLOR UR AUTO: YELLOW
CORTIS SERPL-MCNC: 22.9 UG/DL (ref 4–22)
CREAT SERPL-MCNC: 1.19 MG/DL (ref 0.52–1.25)
CREAT SERPL-MCNC: 1.58 MG/DL (ref 0.52–1.25)
CREAT SERPL-MCNC: 2.03 MG/DL (ref 0.52–1.25)
CREAT SERPL-MCNC: 2.39 MG/DL (ref 0.66–1.25)
CREAT SERPL-MCNC: 2.47 MG/DL (ref 0.66–1.25)
CREAT SERPL-MCNC: 2.55 MG/DL (ref 0.66–1.25)
CREAT SERPL-MCNC: 2.62 MG/DL (ref 0.66–1.25)
CREAT SERPL-MCNC: 2.67 MG/DL (ref 0.66–1.25)
CREAT SERPL-MCNC: 2.69 MG/DL (ref 0.66–1.25)
CREAT SERPL-MCNC: 2.74 MG/DL (ref 0.66–1.25)
CREAT SERPL-MCNC: 2.75 MG/DL (ref 0.66–1.25)
CREAT SERPL-MCNC: 2.76 MG/DL (ref 0.66–1.25)
CREAT SERPL-MCNC: 2.8 MG/DL (ref 0.66–1.25)
CREAT SERPL-MCNC: 2.81 MG/DL (ref 0.66–1.25)
CREAT SERPL-MCNC: 2.84 MG/DL (ref 0.66–1.25)
CREAT SERPL-MCNC: 2.84 MG/DL (ref 0.66–1.25)
CREAT SERPL-MCNC: 2.89 MG/DL (ref 0.66–1.25)
CREAT SERPL-MCNC: 2.9 MG/DL (ref 0.66–1.25)
CREAT SERPL-MCNC: 2.93 MG/DL (ref 0.66–1.25)
CREAT SERPL-MCNC: 2.95 MG/DL (ref 0.66–1.25)
CREAT SERPL-MCNC: 2.96 MG/DL (ref 0.66–1.25)
CREAT SERPL-MCNC: 2.97 MG/DL (ref 0.66–1.25)
CREAT SERPL-MCNC: 2.97 MG/DL (ref 0.66–1.25)
CREAT SERPL-MCNC: 3 MG/DL (ref 0.66–1.25)
CREAT SERPL-MCNC: 3.06 MG/DL (ref 0.66–1.25)
CREAT SERPL-MCNC: 3.08 MG/DL (ref 0.66–1.25)
CREAT SERPL-MCNC: 3.15 MG/DL (ref 0.66–1.25)
CREAT SERPL-MCNC: 3.19 MG/DL (ref 0.66–1.25)
CREAT SERPL-MCNC: 3.21 MG/DL (ref 0.66–1.25)
CREAT SERPL-MCNC: 3.25 MG/DL (ref 0.66–1.25)
CREAT SERPL-MCNC: 3.25 MG/DL (ref 0.66–1.25)
CREAT SERPL-MCNC: 3.26 MG/DL (ref 0.66–1.25)
CREAT SERPL-MCNC: 3.32 MG/DL (ref 0.66–1.25)
CREAT SERPL-MCNC: 3.34 MG/DL (ref 0.66–1.25)
CREAT SERPL-MCNC: 3.37 MG/DL (ref 0.66–1.25)
CREAT SERPL-MCNC: 3.41 MG/DL (ref 0.66–1.25)
CREAT SERPL-MCNC: 3.41 MG/DL (ref 0.66–1.25)
CREAT SERPL-MCNC: 3.44 MG/DL (ref 0.66–1.25)
CREAT SERPL-MCNC: 3.52 MG/DL (ref 0.66–1.25)
CREAT SERPL-MCNC: 3.53 MG/DL (ref 0.66–1.25)
CREAT SERPL-MCNC: 3.61 MG/DL (ref 0.66–1.25)
CREAT SERPL-MCNC: 3.71 MG/DL (ref 0.66–1.25)
CREAT SERPL-MCNC: 3.71 MG/DL (ref 0.66–1.25)
CREAT SERPL-MCNC: 3.72 MG/DL (ref 0.66–1.25)
CREAT SERPL-MCNC: 3.79 MG/DL (ref 0.66–1.25)
CREAT SERPL-MCNC: 3.83 MG/DL (ref 0.66–1.25)
CREAT SERPL-MCNC: 3.96 MG/DL (ref 0.66–1.25)
CREAT SERPL-MCNC: 4.01 MG/DL (ref 0.66–1.25)
CREAT SERPL-MCNC: 4.01 MG/DL (ref 0.66–1.25)
CREAT SERPL-MCNC: 4.09 MG/DL (ref 0.66–1.25)
CREAT SERPL-MCNC: 4.1 MG/DL (ref 0.66–1.25)
CREAT SERPL-MCNC: 4.15 MG/DL (ref 0.66–1.25)
CREAT SERPL-MCNC: 4.26 MG/DL (ref 0.66–1.25)
CREAT SERPL-MCNC: 4.26 MG/DL (ref 0.66–1.25)
CREAT SERPL-MCNC: 4.27 MG/DL (ref 0.66–1.25)
CREAT SERPL-MCNC: 4.33 MG/DL (ref 0.66–1.25)
CREAT SERPL-MCNC: 4.37 MG/DL (ref 0.66–1.25)
CREAT SERPL-MCNC: 4.46 MG/DL (ref 0.66–1.25)
CREAT SERPL-MCNC: 4.5 MG/DL (ref 0.66–1.25)
CREAT SERPL-MCNC: 4.53 MG/DL (ref 0.66–1.25)
CREAT SERPL-MCNC: 4.68 MG/DL (ref 0.66–1.25)
CREAT SERPL-MCNC: 4.68 MG/DL (ref 0.66–1.25)
CREAT SERPL-MCNC: 4.76 MG/DL (ref 0.66–1.25)
CREAT SERPL-MCNC: 4.9 MG/DL (ref 0.66–1.25)
CREAT SERPL-MCNC: 4.92 MG/DL (ref 0.66–1.25)
CREAT SERPL-MCNC: 4.95 MG/DL (ref 0.66–1.25)
CREAT SERPL-MCNC: 5.14 MG/DL (ref 0.66–1.25)
CREAT SERPL-MCNC: 5.23 MG/DL (ref 0.66–1.25)
CREAT SERPL-MCNC: 5.28 MG/DL (ref 0.66–1.25)
CREAT SERPL-MCNC: 5.45 MG/DL (ref 0.66–1.25)
CROSSMATCH: NORMAL
CRP SERPL-MCNC: 13 MG/L (ref 0–8)
CRP SERPL-MCNC: 170 MG/L (ref 0–8)
CRP SERPL-MCNC: 190 MG/L (ref 0–8)
CRP SERPL-MCNC: 33 MG/L (ref 0–8)
CRP SERPL-MCNC: 4.3 MG/L (ref 0–8)
CRP SERPL-MCNC: 42 MG/L (ref 0–8)
CRP SERPL-MCNC: 50 MG/L (ref 0–8)
CRP SERPL-MCNC: 65 MG/L (ref 0–8)
CRP SERPL-MCNC: 94 MG/L (ref 0–8)
CRP SERPL-MCNC: 97 MG/L (ref 0–8)
D DIMER PPP FEU-MCNC: 0.46 UG/ML FEU (ref 0–0.5)
D DIMER PPP FEU-MCNC: 15.32 UG/ML FEU (ref 0–0.5)
D DIMER PPP FEU-MCNC: 3.28 UG/ML FEU (ref 0–0.5)
D DIMER PPP FEU-MCNC: 3.5 UG/ML FEU (ref 0–0.5)
D DIMER PPP FEU-MCNC: 3.57 UG/ML FEU (ref 0–0.5)
D DIMER PPP FEU-MCNC: 3.67 UG/ML FEU (ref 0–0.5)
D DIMER PPP FEU-MCNC: 4.36 UG/ML FEU (ref 0–0.5)
D DIMER PPP FEU-MCNC: 5.69 UG/ML FEU (ref 0–0.5)
D DIMER PPP FEU-MCNC: 6.09 UG/ML FEU (ref 0–0.5)
D DIMER PPP FEU-MCNC: 6.35 UG/ML FEU (ref 0–0.5)
D DIMER PPP FEU-MCNC: 7.29 UG/ML FEU (ref 0–0.5)
D DIMER PPP FEU-MCNC: 9.78 UG/ML FEU (ref 0–0.5)
D DIMER PPP FEU-MCNC: >20 UG/ML FEU (ref 0–0.5)
DIASTOLIC BLOOD PRESSURE - MUSE: NORMAL MMHG
EOSINOPHIL # BLD AUTO: 0 10E3/UL (ref 0–0.7)
EOSINOPHIL # BLD MANUAL: 0 10E3/UL (ref 0–0.7)
EOSINOPHIL NFR BLD AUTO: 0 %
EOSINOPHIL NFR BLD MANUAL: 0 %
ERYTHROCYTE [DISTWIDTH] IN BLOOD BY AUTOMATED COUNT: 13.1 % (ref 10–15)
ERYTHROCYTE [DISTWIDTH] IN BLOOD BY AUTOMATED COUNT: 13.3 % (ref 10–15)
ERYTHROCYTE [DISTWIDTH] IN BLOOD BY AUTOMATED COUNT: 13.3 % (ref 10–15)
ERYTHROCYTE [DISTWIDTH] IN BLOOD BY AUTOMATED COUNT: 13.5 % (ref 10–15)
ERYTHROCYTE [DISTWIDTH] IN BLOOD BY AUTOMATED COUNT: 13.8 % (ref 10–15)
ERYTHROCYTE [DISTWIDTH] IN BLOOD BY AUTOMATED COUNT: 13.9 % (ref 10–15)
ERYTHROCYTE [DISTWIDTH] IN BLOOD BY AUTOMATED COUNT: 14 % (ref 10–15)
ERYTHROCYTE [DISTWIDTH] IN BLOOD BY AUTOMATED COUNT: 14.4 % (ref 10–15)
ERYTHROCYTE [DISTWIDTH] IN BLOOD BY AUTOMATED COUNT: 14.6 % (ref 10–15)
ERYTHROCYTE [DISTWIDTH] IN BLOOD BY AUTOMATED COUNT: 14.7 % (ref 10–15)
ERYTHROCYTE [DISTWIDTH] IN BLOOD BY AUTOMATED COUNT: 14.8 % (ref 10–15)
ERYTHROCYTE [DISTWIDTH] IN BLOOD BY AUTOMATED COUNT: 14.9 % (ref 10–15)
ERYTHROCYTE [DISTWIDTH] IN BLOOD BY AUTOMATED COUNT: 15 % (ref 10–15)
ERYTHROCYTE [DISTWIDTH] IN BLOOD BY AUTOMATED COUNT: 15.9 % (ref 10–15)
ERYTHROCYTE [DISTWIDTH] IN BLOOD BY AUTOMATED COUNT: 16.1 % (ref 10–15)
ERYTHROCYTE [DISTWIDTH] IN BLOOD BY AUTOMATED COUNT: 17.2 % (ref 10–15)
ERYTHROCYTE [DISTWIDTH] IN BLOOD BY AUTOMATED COUNT: 17.7 % (ref 10–15)
ERYTHROCYTE [DISTWIDTH] IN BLOOD BY AUTOMATED COUNT: 18.1 % (ref 10–15)
ERYTHROCYTE [SEDIMENTATION RATE] IN BLOOD BY WESTERGREN METHOD: 10 MM/HR (ref 0–15)
ERYTHROCYTE [SEDIMENTATION RATE] IN BLOOD BY WESTERGREN METHOD: 101 MM/HR (ref 0–15)
ERYTHROCYTE [SEDIMENTATION RATE] IN BLOOD BY WESTERGREN METHOD: 36 MM/HR (ref 0–15)
ERYTHROCYTE [SEDIMENTATION RATE] IN BLOOD BY WESTERGREN METHOD: 4 MM/HR (ref 0–30)
ERYTHROCYTE [SEDIMENTATION RATE] IN BLOOD BY WESTERGREN METHOD: 41 MM/HR (ref 0–15)
ERYTHROCYTE [SEDIMENTATION RATE] IN BLOOD BY WESTERGREN METHOD: 5 MM/HR (ref 0–30)
ERYTHROCYTE [SEDIMENTATION RATE] IN BLOOD BY WESTERGREN METHOD: 54 MM/HR (ref 0–15)
ERYTHROCYTE [SEDIMENTATION RATE] IN BLOOD BY WESTERGREN METHOD: 85 MM/HR (ref 0–15)
ERYTHROCYTE [SEDIMENTATION RATE] IN BLOOD BY WESTERGREN METHOD: 90 MM/HR (ref 0–15)
ERYTHROCYTE [SEDIMENTATION RATE] IN BLOOD BY WESTERGREN METHOD: 93 MM/HR (ref 0–15)
ETHANOL UR QL SCN: ABNORMAL
FIBRINOGEN PPP-MCNC: 159 MG/DL (ref 170–490)
FIBRINOGEN PPP-MCNC: 199 MG/DL (ref 170–490)
FIBRINOGEN PPP-MCNC: 260 MG/DL (ref 170–490)
FIBRINOGEN PPP-MCNC: 291 MG/DL (ref 170–490)
FIBRINOGEN PPP-MCNC: 312 MG/DL (ref 170–490)
FIBRINOGEN PPP-MCNC: 341 MG/DL (ref 170–490)
FIBRINOGEN PPP-MCNC: 472 MG/DL (ref 170–490)
FIBRINOGEN PPP-MCNC: 495 MG/DL (ref 170–490)
FIBRINOGEN PPP-MCNC: 557 MG/DL (ref 170–490)
FIBRINOGEN PPP-MCNC: 607 MG/DL (ref 170–490)
FIBRINOGEN PPP-MCNC: 636 MG/DL (ref 170–490)
FIBRINOGEN PPP-MCNC: 648 MG/DL (ref 170–490)
FIBRINOGEN PPP-MCNC: 653 MG/DL (ref 170–490)
FIBRINOGEN PPP-MCNC: 655 MG/DL (ref 170–490)
FIBRINOGEN PPP-MCNC: 683 MG/DL (ref 170–490)
FIBRINOGEN PPP-MCNC: 697 MG/DL (ref 170–490)
FIBRINOGEN PPP-MCNC: 707 MG/DL (ref 170–490)
FIBRINOGEN PPP-MCNC: >1200 MG/DL (ref 170–490)
GFR SERPL CREATININE-BSD FRML MDRD: 13 ML/MIN/1.73M2
GFR SERPL CREATININE-BSD FRML MDRD: 14 ML/MIN/1.73M2
GFR SERPL CREATININE-BSD FRML MDRD: 15 ML/MIN/1.73M2
GFR SERPL CREATININE-BSD FRML MDRD: 16 ML/MIN/1.73M2
GFR SERPL CREATININE-BSD FRML MDRD: 17 ML/MIN/1.73M2
GFR SERPL CREATININE-BSD FRML MDRD: 18 ML/MIN/1.73M2
GFR SERPL CREATININE-BSD FRML MDRD: 19 ML/MIN/1.73M2
GFR SERPL CREATININE-BSD FRML MDRD: 20 ML/MIN/1.73M2
GFR SERPL CREATININE-BSD FRML MDRD: 20 ML/MIN/1.73M2
GFR SERPL CREATININE-BSD FRML MDRD: 21 ML/MIN/1.73M2
GFR SERPL CREATININE-BSD FRML MDRD: 22 ML/MIN/1.73M2
GFR SERPL CREATININE-BSD FRML MDRD: 23 ML/MIN/1.73M2
GFR SERPL CREATININE-BSD FRML MDRD: 24 ML/MIN/1.73M2
GFR SERPL CREATININE-BSD FRML MDRD: 25 ML/MIN/1.73M2
GFR SERPL CREATININE-BSD FRML MDRD: 26 ML/MIN/1.73M2
GFR SERPL CREATININE-BSD FRML MDRD: 27 ML/MIN/1.73M2
GFR SERPL CREATININE-BSD FRML MDRD: 28 ML/MIN/1.73M2
GFR SERPL CREATININE-BSD FRML MDRD: 29 ML/MIN/1.73M2
GFR SERPL CREATININE-BSD FRML MDRD: 30 ML/MIN/1.73M2
GFR SERPL CREATININE-BSD FRML MDRD: 31 ML/MIN/1.73M2
GFR SERPL CREATININE-BSD FRML MDRD: 31 ML/MIN/1.73M2
GFR SERPL CREATININE-BSD FRML MDRD: 32 ML/MIN/1.73M2
GFR SERPL CREATININE-BSD FRML MDRD: 33 ML/MIN/1.73M2
GFR SERPL CREATININE-BSD FRML MDRD: 34 ML/MIN/1.73M2
GFR SERPL CREATININE-BSD FRML MDRD: 36 ML/MIN/1.73M2
GFR SERPL CREATININE-BSD FRML MDRD: ABNORMAL ML/MIN/{1.73_M2}
GLUCOSE BLD-MCNC: 100 MG/DL (ref 70–99)
GLUCOSE BLD-MCNC: 101 MG/DL (ref 70–99)
GLUCOSE BLD-MCNC: 101 MG/DL (ref 70–99)
GLUCOSE BLD-MCNC: 102 MG/DL (ref 70–99)
GLUCOSE BLD-MCNC: 102 MG/DL (ref 70–99)
GLUCOSE BLD-MCNC: 103 MG/DL (ref 70–99)
GLUCOSE BLD-MCNC: 105 MG/DL (ref 70–99)
GLUCOSE BLD-MCNC: 105 MG/DL (ref 70–99)
GLUCOSE BLD-MCNC: 106 MG/DL (ref 70–99)
GLUCOSE BLD-MCNC: 107 MG/DL (ref 70–99)
GLUCOSE BLD-MCNC: 107 MG/DL (ref 70–99)
GLUCOSE BLD-MCNC: 108 MG/DL (ref 70–99)
GLUCOSE BLD-MCNC: 110 MG/DL (ref 70–99)
GLUCOSE BLD-MCNC: 110 MG/DL (ref 70–99)
GLUCOSE BLD-MCNC: 112 MG/DL (ref 70–99)
GLUCOSE BLD-MCNC: 116 MG/DL (ref 70–99)
GLUCOSE BLD-MCNC: 117 MG/DL (ref 70–99)
GLUCOSE BLD-MCNC: 118 MG/DL (ref 70–99)
GLUCOSE BLD-MCNC: 118 MG/DL (ref 70–99)
GLUCOSE BLD-MCNC: 119 MG/DL (ref 70–99)
GLUCOSE BLD-MCNC: 120 MG/DL (ref 70–99)
GLUCOSE BLD-MCNC: 120 MG/DL (ref 70–99)
GLUCOSE BLD-MCNC: 121 MG/DL (ref 70–99)
GLUCOSE BLD-MCNC: 122 MG/DL (ref 70–99)
GLUCOSE BLD-MCNC: 123 MG/DL (ref 70–99)
GLUCOSE BLD-MCNC: 124 MG/DL (ref 70–99)
GLUCOSE BLD-MCNC: 125 MG/DL (ref 70–99)
GLUCOSE BLD-MCNC: 126 MG/DL (ref 70–99)
GLUCOSE BLD-MCNC: 128 MG/DL (ref 70–99)
GLUCOSE BLD-MCNC: 129 MG/DL (ref 70–99)
GLUCOSE BLD-MCNC: 132 MG/DL (ref 70–99)
GLUCOSE BLD-MCNC: 133 MG/DL (ref 70–99)
GLUCOSE BLD-MCNC: 134 MG/DL (ref 70–99)
GLUCOSE BLD-MCNC: 135 MG/DL (ref 70–99)
GLUCOSE BLD-MCNC: 137 MG/DL (ref 70–99)
GLUCOSE BLD-MCNC: 137 MG/DL (ref 70–99)
GLUCOSE BLD-MCNC: 138 MG/DL (ref 70–99)
GLUCOSE BLD-MCNC: 140 MG/DL (ref 70–99)
GLUCOSE BLD-MCNC: 141 MG/DL (ref 70–99)
GLUCOSE BLD-MCNC: 142 MG/DL (ref 70–99)
GLUCOSE BLD-MCNC: 143 MG/DL (ref 70–99)
GLUCOSE BLD-MCNC: 144 MG/DL (ref 70–99)
GLUCOSE BLD-MCNC: 147 MG/DL (ref 70–99)
GLUCOSE BLD-MCNC: 147 MG/DL (ref 70–99)
GLUCOSE BLD-MCNC: 148 MG/DL (ref 70–99)
GLUCOSE BLD-MCNC: 149 MG/DL (ref 70–99)
GLUCOSE BLD-MCNC: 150 MG/DL (ref 70–99)
GLUCOSE BLD-MCNC: 150 MG/DL (ref 70–99)
GLUCOSE BLD-MCNC: 152 MG/DL (ref 70–99)
GLUCOSE BLD-MCNC: 153 MG/DL (ref 70–99)
GLUCOSE BLD-MCNC: 154 MG/DL (ref 70–99)
GLUCOSE BLD-MCNC: 154 MG/DL (ref 70–99)
GLUCOSE BLD-MCNC: 169 MG/DL (ref 70–99)
GLUCOSE BLD-MCNC: 171 MG/DL (ref 70–99)
GLUCOSE BLD-MCNC: 196 MG/DL (ref 70–99)
GLUCOSE BLD-MCNC: 230 MG/DL (ref 70–99)
GLUCOSE BLD-MCNC: 51 MG/DL (ref 70–99)
GLUCOSE BLD-MCNC: 60 MG/DL (ref 70–99)
GLUCOSE BLD-MCNC: 66 MG/DL (ref 70–99)
GLUCOSE BLD-MCNC: 69 MG/DL (ref 70–99)
GLUCOSE BLD-MCNC: 77 MG/DL (ref 70–99)
GLUCOSE BLD-MCNC: 77 MG/DL (ref 70–99)
GLUCOSE BLD-MCNC: 82 MG/DL (ref 70–99)
GLUCOSE BLD-MCNC: 84 MG/DL (ref 70–99)
GLUCOSE BLD-MCNC: 87 MG/DL (ref 70–99)
GLUCOSE BLD-MCNC: 90 MG/DL (ref 70–99)
GLUCOSE BLD-MCNC: 91 MG/DL (ref 70–99)
GLUCOSE BLD-MCNC: 91 MG/DL (ref 70–99)
GLUCOSE BLD-MCNC: 93 MG/DL (ref 70–99)
GLUCOSE BLD-MCNC: 94 MG/DL (ref 70–99)
GLUCOSE BLD-MCNC: 94 MG/DL (ref 70–99)
GLUCOSE BLD-MCNC: 95 MG/DL (ref 70–99)
GLUCOSE BLD-MCNC: 96 MG/DL (ref 70–99)
GLUCOSE BLD-MCNC: 99 MG/DL (ref 70–99)
GLUCOSE BLDC GLUCOMTR-MCNC: 100 MG/DL (ref 70–99)
GLUCOSE BLDC GLUCOMTR-MCNC: 101 MG/DL (ref 70–99)
GLUCOSE BLDC GLUCOMTR-MCNC: 102 MG/DL (ref 70–99)
GLUCOSE BLDC GLUCOMTR-MCNC: 103 MG/DL (ref 70–99)
GLUCOSE BLDC GLUCOMTR-MCNC: 105 MG/DL (ref 70–99)
GLUCOSE BLDC GLUCOMTR-MCNC: 109 MG/DL (ref 70–99)
GLUCOSE BLDC GLUCOMTR-MCNC: 111 MG/DL (ref 70–99)
GLUCOSE BLDC GLUCOMTR-MCNC: 112 MG/DL (ref 70–99)
GLUCOSE BLDC GLUCOMTR-MCNC: 113 MG/DL (ref 70–99)
GLUCOSE BLDC GLUCOMTR-MCNC: 114 MG/DL (ref 70–99)
GLUCOSE BLDC GLUCOMTR-MCNC: 114 MG/DL (ref 70–99)
GLUCOSE BLDC GLUCOMTR-MCNC: 116 MG/DL (ref 70–99)
GLUCOSE BLDC GLUCOMTR-MCNC: 116 MG/DL (ref 70–99)
GLUCOSE BLDC GLUCOMTR-MCNC: 117 MG/DL (ref 70–99)
GLUCOSE BLDC GLUCOMTR-MCNC: 118 MG/DL (ref 70–99)
GLUCOSE BLDC GLUCOMTR-MCNC: 119 MG/DL (ref 70–99)
GLUCOSE BLDC GLUCOMTR-MCNC: 121 MG/DL (ref 70–99)
GLUCOSE BLDC GLUCOMTR-MCNC: 123 MG/DL (ref 70–99)
GLUCOSE BLDC GLUCOMTR-MCNC: 123 MG/DL (ref 70–99)
GLUCOSE BLDC GLUCOMTR-MCNC: 124 MG/DL (ref 70–99)
GLUCOSE BLDC GLUCOMTR-MCNC: 125 MG/DL (ref 70–99)
GLUCOSE BLDC GLUCOMTR-MCNC: 126 MG/DL (ref 70–99)
GLUCOSE BLDC GLUCOMTR-MCNC: 126 MG/DL (ref 70–99)
GLUCOSE BLDC GLUCOMTR-MCNC: 127 MG/DL (ref 70–99)
GLUCOSE BLDC GLUCOMTR-MCNC: 127 MG/DL (ref 70–99)
GLUCOSE BLDC GLUCOMTR-MCNC: 128 MG/DL (ref 70–99)
GLUCOSE BLDC GLUCOMTR-MCNC: 129 MG/DL (ref 70–99)
GLUCOSE BLDC GLUCOMTR-MCNC: 129 MG/DL (ref 70–99)
GLUCOSE BLDC GLUCOMTR-MCNC: 130 MG/DL (ref 70–99)
GLUCOSE BLDC GLUCOMTR-MCNC: 131 MG/DL (ref 70–99)
GLUCOSE BLDC GLUCOMTR-MCNC: 132 MG/DL (ref 70–99)
GLUCOSE BLDC GLUCOMTR-MCNC: 133 MG/DL (ref 70–99)
GLUCOSE BLDC GLUCOMTR-MCNC: 134 MG/DL (ref 70–99)
GLUCOSE BLDC GLUCOMTR-MCNC: 134 MG/DL (ref 70–99)
GLUCOSE BLDC GLUCOMTR-MCNC: 135 MG/DL (ref 70–99)
GLUCOSE BLDC GLUCOMTR-MCNC: 136 MG/DL (ref 70–99)
GLUCOSE BLDC GLUCOMTR-MCNC: 137 MG/DL (ref 70–99)
GLUCOSE BLDC GLUCOMTR-MCNC: 137 MG/DL (ref 70–99)
GLUCOSE BLDC GLUCOMTR-MCNC: 139 MG/DL (ref 70–99)
GLUCOSE BLDC GLUCOMTR-MCNC: 139 MG/DL (ref 70–99)
GLUCOSE BLDC GLUCOMTR-MCNC: 142 MG/DL (ref 70–99)
GLUCOSE BLDC GLUCOMTR-MCNC: 142 MG/DL (ref 70–99)
GLUCOSE BLDC GLUCOMTR-MCNC: 143 MG/DL (ref 70–99)
GLUCOSE BLDC GLUCOMTR-MCNC: 144 MG/DL (ref 70–99)
GLUCOSE BLDC GLUCOMTR-MCNC: 155 MG/DL (ref 70–99)
GLUCOSE BLDC GLUCOMTR-MCNC: 177 MG/DL (ref 70–99)
GLUCOSE BLDC GLUCOMTR-MCNC: 178 MG/DL (ref 70–99)
GLUCOSE BLDC GLUCOMTR-MCNC: 179 MG/DL (ref 70–99)
GLUCOSE BLDC GLUCOMTR-MCNC: 180 MG/DL (ref 70–99)
GLUCOSE BLDC GLUCOMTR-MCNC: 181 MG/DL (ref 70–99)
GLUCOSE BLDC GLUCOMTR-MCNC: 193 MG/DL (ref 70–99)
GLUCOSE BLDC GLUCOMTR-MCNC: 193 MG/DL (ref 70–99)
GLUCOSE BLDC GLUCOMTR-MCNC: 70 MG/DL (ref 70–99)
GLUCOSE BLDC GLUCOMTR-MCNC: 72 MG/DL (ref 70–99)
GLUCOSE BLDC GLUCOMTR-MCNC: 74 MG/DL (ref 70–99)
GLUCOSE BLDC GLUCOMTR-MCNC: 75 MG/DL (ref 70–99)
GLUCOSE BLDC GLUCOMTR-MCNC: 76 MG/DL (ref 70–99)
GLUCOSE BLDC GLUCOMTR-MCNC: 77 MG/DL (ref 70–99)
GLUCOSE BLDC GLUCOMTR-MCNC: 81 MG/DL (ref 70–99)
GLUCOSE BLDC GLUCOMTR-MCNC: 84 MG/DL (ref 70–99)
GLUCOSE BLDC GLUCOMTR-MCNC: 89 MG/DL (ref 70–99)
GLUCOSE BLDC GLUCOMTR-MCNC: 91 MG/DL (ref 70–99)
GLUCOSE BLDC GLUCOMTR-MCNC: 92 MG/DL (ref 70–99)
GLUCOSE BLDC GLUCOMTR-MCNC: 93 MG/DL (ref 70–99)
GLUCOSE BLDC GLUCOMTR-MCNC: 95 MG/DL (ref 70–99)
GLUCOSE BLDC GLUCOMTR-MCNC: 98 MG/DL (ref 70–99)
GLUCOSE BLDC GLUCOMTR-MCNC: 99 MG/DL (ref 70–99)
GLUCOSE BLDC GLUCOMTR-MCNC: 99 MG/DL (ref 70–99)
GLUCOSE UR STRIP-MCNC: 100 MG/DL
GLUCOSE UR STRIP-MCNC: 70 MG/DL
GLUCOSE UR STRIP-MCNC: NEGATIVE MG/DL
GRAM STAIN RESULT: ABNORMAL
HAPTOGLOB SERPL-MCNC: 3 MG/DL (ref 32–197)
HAPTOGLOB SERPL-MCNC: <3 MG/DL (ref 32–197)
HAPTOGLOB SERPL-MCNC: <3 MG/DL (ref 32–197)
HBA1C MFR BLD: 5.3 % (ref 0–5.6)
HBA1C MFR BLD: 5.4 % (ref 0–5.6)
HCO3 BLD-SCNC: 13 MMOL/L (ref 21–28)
HCO3 BLD-SCNC: 14 MMOL/L (ref 21–28)
HCO3 BLD-SCNC: 16 MMOL/L (ref 21–28)
HCO3 BLD-SCNC: 16 MMOL/L (ref 21–28)
HCO3 BLD-SCNC: 17 MMOL/L (ref 21–28)
HCO3 BLD-SCNC: 18 MMOL/L (ref 21–28)
HCO3 BLD-SCNC: 19 MMOL/L (ref 21–28)
HCO3 BLD-SCNC: 20 MMOL/L (ref 21–28)
HCO3 BLD-SCNC: 21 MMOL/L (ref 21–28)
HCO3 BLD-SCNC: 22 MMOL/L (ref 21–28)
HCO3 BLD-SCNC: 23 MMOL/L (ref 21–28)
HCO3 BLD-SCNC: 24 MMOL/L (ref 21–28)
HCO3 BLD-SCNC: 25 MMOL/L (ref 21–28)
HCO3 BLD-SCNC: 26 MMOL/L (ref 21–28)
HCO3 BLDA-SCNC: 13 MMOL/L (ref 21–28)
HCO3 BLDA-SCNC: 15 MMOL/L (ref 21–28)
HCO3 BLDA-SCNC: 15 MMOL/L (ref 21–28)
HCO3 BLDA-SCNC: 17 MMOL/L (ref 21–28)
HCO3 BLDA-SCNC: 18 MMOL/L (ref 21–28)
HCO3 BLDA-SCNC: 19 MMOL/L (ref 21–28)
HCO3 BLDA-SCNC: 20 MMOL/L (ref 21–28)
HCO3 BLDA-SCNC: 21 MMOL/L (ref 21–28)
HCO3 BLDA-SCNC: 22 MMOL/L (ref 21–28)
HCO3 BLDA-SCNC: 23 MMOL/L (ref 21–28)
HCO3 BLDA-SCNC: 23 MMOL/L (ref 21–28)
HCO3 BLDA-SCNC: 24 MMOL/L (ref 21–28)
HCO3 BLDA-SCNC: 25 MMOL/L (ref 21–28)
HCO3 BLDA-SCNC: 25 MMOL/L (ref 21–28)
HCO3 BLDV-SCNC: 16 MMOL/L (ref 21–28)
HCO3 BLDV-SCNC: 18 MMOL/L (ref 21–28)
HCO3 BLDV-SCNC: 19 MMOL/L (ref 21–28)
HCO3 BLDV-SCNC: 20 MMOL/L (ref 21–28)
HCO3 BLDV-SCNC: 21 MMOL/L (ref 21–28)
HCO3 BLDV-SCNC: 22 MMOL/L (ref 21–28)
HCO3 BLDV-SCNC: 23 MMOL/L (ref 21–28)
HCO3 BLDV-SCNC: 24 MMOL/L (ref 21–28)
HCO3 BLDV-SCNC: 25 MMOL/L (ref 21–28)
HCO3 BLDV-SCNC: 26 MMOL/L (ref 21–28)
HCO3 BLDV-SCNC: 26 MMOL/L (ref 21–28)
HCT VFR BLD AUTO: 21.8 % (ref 40–53)
HCT VFR BLD AUTO: 22.3 % (ref 40–53)
HCT VFR BLD AUTO: 22.4 % (ref 40–53)
HCT VFR BLD AUTO: 22.7 % (ref 40–53)
HCT VFR BLD AUTO: 23.1 % (ref 40–53)
HCT VFR BLD AUTO: 23.3 % (ref 40–53)
HCT VFR BLD AUTO: 23.5 % (ref 40–53)
HCT VFR BLD AUTO: 23.8 % (ref 40–53)
HCT VFR BLD AUTO: 24 % (ref 40–53)
HCT VFR BLD AUTO: 24.1 % (ref 40–53)
HCT VFR BLD AUTO: 24.5 % (ref 40–53)
HCT VFR BLD AUTO: 24.9 % (ref 40–53)
HCT VFR BLD AUTO: 24.9 % (ref 40–53)
HCT VFR BLD AUTO: 25.2 % (ref 40–53)
HCT VFR BLD AUTO: 25.2 % (ref 40–53)
HCT VFR BLD AUTO: 25.7 % (ref 40–53)
HCT VFR BLD AUTO: 25.8 % (ref 40–53)
HCT VFR BLD AUTO: 25.9 % (ref 40–53)
HCT VFR BLD AUTO: 26.3 % (ref 40–53)
HCT VFR BLD AUTO: 26.7 % (ref 40–53)
HCT VFR BLD AUTO: 27 % (ref 40–53)
HCT VFR BLD AUTO: 27.5 % (ref 40–53)
HCT VFR BLD AUTO: 27.7 % (ref 40–53)
HCT VFR BLD AUTO: 28.6 % (ref 40–53)
HCT VFR BLD AUTO: 29.2 % (ref 40–53)
HCT VFR BLD AUTO: 29.4 % (ref 40–53)
HCT VFR BLD AUTO: 29.5 % (ref 40–53)
HCT VFR BLD AUTO: 29.6 % (ref 40–53)
HCT VFR BLD AUTO: 30.5 % (ref 40–53)
HCT VFR BLD AUTO: 31.2 % (ref 40–53)
HCT VFR BLD AUTO: 33.4 % (ref 40–53)
HCT VFR BLD AUTO: 35.1 % (ref 40–53)
HCT VFR BLD AUTO: 36.7 % (ref 40–53)
HCT VFR BLD AUTO: 38 % (ref 40–53)
HCT VFR BLD AUTO: 39.2 % (ref 40–53)
HCT VFR BLD AUTO: 39.2 % (ref 40–53)
HCT VFR BLD AUTO: 39.8 % (ref 35–53)
HCT VFR BLD AUTO: 40.4 % (ref 35–53)
HCT VFR BLD AUTO: 41.6 % (ref 35–53)
HCT VFR BLD AUTO: 41.6 % (ref 40–53)
HCT VFR BLD AUTO: 43.2 % (ref 35–53)
HGB BLD-MCNC: 10.1 G/DL (ref 13.3–17.7)
HGB BLD-MCNC: 10.2 G/DL (ref 13.3–17.7)
HGB BLD-MCNC: 11.1 G/DL (ref 13.3–17.7)
HGB BLD-MCNC: 11.2 G/DL (ref 11.7–17.7)
HGB BLD-MCNC: 11.5 G/DL (ref 13.3–17.7)
HGB BLD-MCNC: 11.6 G/DL (ref 11.7–17.7)
HGB BLD-MCNC: 12.1 G/DL (ref 11.7–17.7)
HGB BLD-MCNC: 12.1 G/DL (ref 13.3–17.7)
HGB BLD-MCNC: 12.2 G/DL (ref 11.7–17.7)
HGB BLD-MCNC: 12.6 G/DL (ref 13.3–17.7)
HGB BLD-MCNC: 12.9 G/DL (ref 11.7–17.7)
HGB BLD-MCNC: 13 G/DL (ref 13.3–17.7)
HGB BLD-MCNC: 13 G/DL (ref 13.3–17.7)
HGB BLD-MCNC: 13.5 G/DL (ref 11.7–17.7)
HGB BLD-MCNC: 13.6 G/DL (ref 11.7–17.7)
HGB BLD-MCNC: 13.8 G/DL (ref 13.3–17.7)
HGB BLD-MCNC: 14.2 G/DL (ref 11.7–17.7)
HGB BLD-MCNC: 15.8 G/DL (ref 11.7–17.7)
HGB BLD-MCNC: 6.9 G/DL (ref 13.3–17.7)
HGB BLD-MCNC: 7 G/DL (ref 13.3–17.7)
HGB BLD-MCNC: 7.1 G/DL (ref 13.3–17.7)
HGB BLD-MCNC: 7.3 G/DL (ref 13.3–17.7)
HGB BLD-MCNC: 7.4 G/DL (ref 13.3–17.7)
HGB BLD-MCNC: 7.5 G/DL (ref 13.3–17.7)
HGB BLD-MCNC: 7.6 G/DL (ref 13.3–17.7)
HGB BLD-MCNC: 7.8 G/DL (ref 13.3–17.7)
HGB BLD-MCNC: 7.8 G/DL (ref 13.3–17.7)
HGB BLD-MCNC: 7.9 G/DL (ref 13.3–17.7)
HGB BLD-MCNC: 8.1 G/DL (ref 13.3–17.7)
HGB BLD-MCNC: 8.1 G/DL (ref 13.3–17.7)
HGB BLD-MCNC: 8.2 G/DL (ref 13.3–17.7)
HGB BLD-MCNC: 8.3 G/DL (ref 13.3–17.7)
HGB BLD-MCNC: 8.4 G/DL (ref 13.3–17.7)
HGB BLD-MCNC: 8.5 G/DL (ref 13.3–17.7)
HGB BLD-MCNC: 8.7 G/DL (ref 13.3–17.7)
HGB BLD-MCNC: 8.8 G/DL (ref 13.3–17.7)
HGB BLD-MCNC: 8.8 G/DL (ref 13.3–17.7)
HGB BLD-MCNC: 8.9 G/DL (ref 13.3–17.7)
HGB BLD-MCNC: 9.5 G/DL (ref 13.3–17.7)
HGB BLD-MCNC: 9.6 G/DL (ref 13.3–17.7)
HGB BLD-MCNC: 9.6 G/DL (ref 13.3–17.7)
HGB BLD-MCNC: 9.7 G/DL (ref 13.3–17.7)
HGB BLD-MCNC: 9.7 G/DL (ref 13.3–17.7)
HGB FREE PLAS-MCNC: 30 MG/DL
HGB FREE PLAS-MCNC: 40 MG/DL
HGB FREE PLAS-MCNC: 40 MG/DL
HGB FREE PLAS-MCNC: 50 MG/DL
HGB FREE PLAS-MCNC: 70 MG/DL
HGB FREE PLAS-MCNC: 80 MG/DL
HGB FREE PLAS-MCNC: <30 MG/DL
HGB FREE PLAS-MCNC: <30 MG/DL
HGB UR QL STRIP: ABNORMAL
HIV 1+2 AB+HIV1P24 AG SERPLBLD IA.RAPID: NON REACTIVE
HIV 1+2 AB+HIV1P24 AG SERPLBLD IA.RAPID: NON REACTIVE
HIV INTERPRETATION: NORMAL
HOLD SPECIMEN: NORMAL
IMM GRANULOCYTES # BLD: 0.2 10E3/UL
IMM GRANULOCYTES NFR BLD: 1 %
INR PPP: 1.09 (ref 0.85–1.15)
INR PPP: 1.1 (ref 0.85–1.15)
INR PPP: 1.1 (ref 0.85–1.15)
INR PPP: 1.12 (ref 0.85–1.15)
INR PPP: 1.13 (ref 0.85–1.15)
INR PPP: 1.13 (ref 0.85–1.15)
INR PPP: 1.14 (ref 0.85–1.15)
INR PPP: 1.15 (ref 0.85–1.15)
INR PPP: 1.16 (ref 0.85–1.15)
INR PPP: 1.17 (ref 0.85–1.15)
INR PPP: 1.17 (ref 0.85–1.15)
INR PPP: 1.19 (ref 0.85–1.15)
INR PPP: 1.21 (ref 0.85–1.15)
INR PPP: 1.23 (ref 0.85–1.15)
INR PPP: 1.31 (ref 0.85–1.15)
INR PPP: 1.45 (ref 0.85–1.15)
INR PPP: 1.48 (ref 0.85–1.15)
INR PPP: 1.49 (ref 0.85–1.15)
INR PPP: 1.6 (ref 0.85–1.15)
INR PPP: 1.62 (ref 0.85–1.15)
INR PPP: 1.62 (ref 0.85–1.15)
INR PPP: 1.65 (ref 0.85–1.15)
INR PPP: 1.65 (ref 0.85–1.15)
INR PPP: 1.67 (ref 0.85–1.15)
INR PPP: 1.76 (ref 0.85–1.15)
INR PPP: 1.76 (ref 0.85–1.15)
INTERPRETATION ECG - MUSE: NORMAL
INTERPRETATION TEGPIA: NORMAL
INTERPRETATION TEGPIA: NORMAL
ISSUE DATE AND TIME: NORMAL
KETONES UR STRIP-MCNC: NEGATIVE MG/DL
LACTATE BLD-SCNC: 17 MMOL/L
LACTATE BLD-SCNC: 19 MMOL/L
LACTATE BLD-SCNC: 19 MMOL/L
LACTATE BLD-SCNC: 20 MMOL/L
LACTATE BLD-SCNC: 21 MMOL/L
LACTATE SERPL-SCNC: 0.7 MMOL/L (ref 0.7–2)
LACTATE SERPL-SCNC: 0.8 MMOL/L (ref 0.7–2)
LACTATE SERPL-SCNC: 0.9 MMOL/L (ref 0.7–2)
LACTATE SERPL-SCNC: 1 MMOL/L (ref 0.7–2)
LACTATE SERPL-SCNC: 1.1 MMOL/L (ref 0.7–2)
LACTATE SERPL-SCNC: 1.2 MMOL/L (ref 0.7–2)
LACTATE SERPL-SCNC: 1.3 MMOL/L (ref 0.7–2)
LACTATE SERPL-SCNC: 1.4 MMOL/L (ref 0.7–2)
LACTATE SERPL-SCNC: 1.5 MMOL/L (ref 0.7–2)
LACTATE SERPL-SCNC: 1.8 MMOL/L (ref 0.7–2)
LACTATE SERPL-SCNC: 12.2 MMOL/L (ref 0.7–2)
LACTATE SERPL-SCNC: 12.9 MMOL/L (ref 0.7–2)
LACTATE SERPL-SCNC: 13.7 MMOL/L (ref 0.7–2)
LACTATE SERPL-SCNC: 16 MMOL/L (ref 0.7–2)
LACTATE SERPL-SCNC: 19 MMOL/L (ref 0.7–2)
LACTATE SERPL-SCNC: 2.6 MMOL/L (ref 0.7–2)
LACTATE SERPL-SCNC: 2.9 MMOL/L (ref 0.7–2)
LACTATE SERPL-SCNC: 2.9 MMOL/L (ref 0.7–2)
LACTATE SERPL-SCNC: 3.2 MMOL/L (ref 0.7–2)
LACTATE SERPL-SCNC: 4.4 MMOL/L (ref 0.7–2)
LACTATE SERPL-SCNC: 5.1 MMOL/L (ref 0.7–2)
LACTATE SERPL-SCNC: 5.2 MMOL/L (ref 0.7–2)
LACTATE SERPL-SCNC: 5.4 MMOL/L (ref 0.7–2)
LACTATE SERPL-SCNC: 6.3 MMOL/L (ref 0.7–2)
LACTATE SERPL-SCNC: 6.7 MMOL/L (ref 0.7–2)
LACTATE SERPL-SCNC: 7.7 MMOL/L (ref 0.7–2)
LACTATE SERPL-SCNC: 9.2 MMOL/L (ref 0.7–2)
LDH SERPL L TO P-CCNC: 1084 U/L (ref 85–227)
LDH SERPL L TO P-CCNC: 1273 U/L (ref 85–227)
LDH SERPL L TO P-CCNC: 1526 U/L (ref 85–227)
LDH SERPL L TO P-CCNC: 1556 U/L (ref 85–227)
LDH SERPL L TO P-CCNC: 1734 U/L (ref 85–227)
LDH SERPL L TO P-CCNC: 2071 U/L (ref 85–227)
LDH SERPL L TO P-CCNC: 2544 U/L (ref 81–234)
LDH SERPL L TO P-CCNC: 2708 U/L (ref 85–227)
LDH SERPL L TO P-CCNC: 828 U/L (ref 85–227)
LDH SERPL L TO P-CCNC: 926 U/L (ref 81–234)
LEUKOCYTE ESTERASE UR QL STRIP: ABNORMAL
LEUKOCYTE ESTERASE UR QL STRIP: NEGATIVE
LEUKOCYTE ESTERASE UR QL STRIP: NEGATIVE
LIPASE SERPL-CCNC: 1159 U/L (ref 73–393)
LIPASE SERPL-CCNC: 1185 U/L (ref 73–393)
LIPASE SERPL-CCNC: 1223 U/L (ref 73–393)
LIPASE SERPL-CCNC: 1229 U/L (ref 73–393)
LIPASE SERPL-CCNC: 1241 U/L (ref 73–393)
LIPASE SERPL-CCNC: 1242 U/L (ref 73–393)
LIPASE SERPL-CCNC: 1293 U/L (ref 73–393)
LYMPHOCYTES # BLD AUTO: 1.8 10E3/UL (ref 0.8–5.3)
LYMPHOCYTES # BLD MANUAL: 3.5 10E3/UL (ref 0.8–5.3)
LYMPHOCYTES NFR BLD AUTO: 7 %
LYMPHOCYTES NFR BLD MANUAL: 16 %
MAGNESIUM SERPL-MCNC: 2 MG/DL (ref 1.6–2.3)
MAGNESIUM SERPL-MCNC: 2.1 MG/DL (ref 1.6–2.3)
MAGNESIUM SERPL-MCNC: 2.2 MG/DL (ref 1.6–2.3)
MAGNESIUM SERPL-MCNC: 2.2 MG/DL (ref 1.6–2.3)
MAGNESIUM SERPL-MCNC: 2.3 MG/DL (ref 1.6–2.3)
MAGNESIUM SERPL-MCNC: 2.4 MG/DL (ref 1.6–2.3)
MAGNESIUM SERPL-MCNC: 2.5 MG/DL (ref 1.6–2.3)
MAGNESIUM SERPL-MCNC: 2.6 MG/DL (ref 1.6–2.3)
MAGNESIUM SERPL-MCNC: 2.7 MG/DL (ref 1.6–2.3)
MAGNESIUM SERPL-MCNC: 3.1 MG/DL (ref 1.6–2.3)
MCF P HPASE BLD TEG: 56.4 MM (ref 50–70)
MCF P HPASE BLD TEG: 66.5 MM (ref 50–70)
MCF P HPASE BLD TEG: 67.2 MM (ref 50–70)
MCH RBC QN AUTO: 30.2 PG (ref 26.5–33)
MCH RBC QN AUTO: 30.7 PG (ref 26.5–33)
MCH RBC QN AUTO: 31 PG (ref 26.5–33)
MCH RBC QN AUTO: 31.2 PG (ref 26.5–33)
MCH RBC QN AUTO: 31.4 PG (ref 26.5–33)
MCH RBC QN AUTO: 31.8 PG (ref 26.5–33)
MCH RBC QN AUTO: 32.1 PG (ref 26.5–33)
MCH RBC QN AUTO: 32.2 PG (ref 26.5–33)
MCH RBC QN AUTO: 32.3 PG (ref 26.5–33)
MCH RBC QN AUTO: 32.4 PG (ref 26.5–33)
MCH RBC QN AUTO: 32.4 PG (ref 26.5–33)
MCH RBC QN AUTO: 32.5 PG (ref 26.5–33)
MCH RBC QN AUTO: 32.6 PG (ref 26.5–33)
MCH RBC QN AUTO: 32.7 PG (ref 26.5–33)
MCH RBC QN AUTO: 32.7 PG (ref 26.5–33)
MCH RBC QN AUTO: 32.8 PG (ref 26.5–33)
MCH RBC QN AUTO: 32.8 PG (ref 26.5–33)
MCH RBC QN AUTO: 32.9 PG (ref 26.5–33)
MCH RBC QN AUTO: 33 PG (ref 26.5–33)
MCH RBC QN AUTO: 33.1 PG (ref 26.5–33)
MCH RBC QN AUTO: 33.1 PG (ref 26.5–33)
MCH RBC QN AUTO: 33.2 PG (ref 26.5–33)
MCHC RBC AUTO-ENTMCNC: 31.4 G/DL (ref 31.5–36.5)
MCHC RBC AUTO-ENTMCNC: 31.7 G/DL (ref 31.5–36.5)
MCHC RBC AUTO-ENTMCNC: 31.7 G/DL (ref 31.5–36.5)
MCHC RBC AUTO-ENTMCNC: 31.8 G/DL (ref 31.5–36.5)
MCHC RBC AUTO-ENTMCNC: 31.9 G/DL (ref 31.5–36.5)
MCHC RBC AUTO-ENTMCNC: 31.9 G/DL (ref 31.5–36.5)
MCHC RBC AUTO-ENTMCNC: 32 G/DL (ref 31.5–36.5)
MCHC RBC AUTO-ENTMCNC: 32 G/DL (ref 31.5–36.5)
MCHC RBC AUTO-ENTMCNC: 32.1 G/DL (ref 31.5–36.5)
MCHC RBC AUTO-ENTMCNC: 32.2 G/DL (ref 31.5–36.5)
MCHC RBC AUTO-ENTMCNC: 32.2 G/DL (ref 31.5–36.5)
MCHC RBC AUTO-ENTMCNC: 32.3 G/DL (ref 31.5–36.5)
MCHC RBC AUTO-ENTMCNC: 32.4 G/DL (ref 31.5–36.5)
MCHC RBC AUTO-ENTMCNC: 32.4 G/DL (ref 31.5–36.5)
MCHC RBC AUTO-ENTMCNC: 32.5 G/DL (ref 31.5–36.5)
MCHC RBC AUTO-ENTMCNC: 32.6 G/DL (ref 31.5–36.5)
MCHC RBC AUTO-ENTMCNC: 32.7 G/DL (ref 31.5–36.5)
MCHC RBC AUTO-ENTMCNC: 32.8 G/DL (ref 31.5–36.5)
MCHC RBC AUTO-ENTMCNC: 32.8 G/DL (ref 31.5–36.5)
MCHC RBC AUTO-ENTMCNC: 32.9 G/DL (ref 31.5–36.5)
MCHC RBC AUTO-ENTMCNC: 33 G/DL (ref 31.5–36.5)
MCHC RBC AUTO-ENTMCNC: 33 G/DL (ref 31.5–36.5)
MCHC RBC AUTO-ENTMCNC: 33.1 G/DL (ref 31.5–36.5)
MCHC RBC AUTO-ENTMCNC: 33.2 G/DL (ref 31.5–36.5)
MCHC RBC AUTO-ENTMCNC: 33.3 G/DL (ref 31.5–36.5)
MCHC RBC AUTO-ENTMCNC: 33.7 G/DL (ref 31.5–36.5)
MCV RBC AUTO: 100 FL (ref 78–100)
MCV RBC AUTO: 101 FL (ref 78–100)
MCV RBC AUTO: 102 FL (ref 78–100)
MCV RBC AUTO: 103 FL (ref 78–100)
MCV RBC AUTO: 103 FL (ref 78–100)
MCV RBC AUTO: 104 FL (ref 78–100)
MCV RBC AUTO: 104 FL (ref 78–100)
MCV RBC AUTO: 94 FL (ref 78–100)
MCV RBC AUTO: 95 FL (ref 78–100)
MCV RBC AUTO: 96 FL (ref 78–100)
MCV RBC AUTO: 96 FL (ref 78–100)
MCV RBC AUTO: 97 FL (ref 78–100)
MCV RBC AUTO: 98 FL (ref 78–100)
MCV RBC AUTO: 99 FL (ref 78–100)
MONOCYTES # BLD AUTO: 1.5 10E3/UL (ref 0–1.3)
MONOCYTES # BLD MANUAL: 2 10E3/UL (ref 0–1.3)
MONOCYTES NFR BLD AUTO: 6 %
MONOCYTES NFR BLD MANUAL: 9 %
MRSA DNA SPEC QL NAA+PROBE: POSITIVE
MUCOUS THREADS #/AREA URNS LPF: PRESENT /LPF
MUCOUS THREADS #/AREA URNS LPF: PRESENT /LPF
NEUTROPHILS # BLD AUTO: 22.3 10E3/UL (ref 1.6–8.3)
NEUTROPHILS # BLD MANUAL: 16.5 10E3/UL (ref 1.6–8.3)
NEUTROPHILS NFR BLD AUTO: 86 %
NEUTROPHILS NFR BLD MANUAL: 75 %
NITRATE UR QL: NEGATIVE
NRBC # BLD AUTO: 0 10E3/UL
NRBC BLD AUTO-RTO: 0 /100
NSE SERPL IA-MCNC: 36.8 NG/ML
NSE SERPL IA-MCNC: 55 NG/ML
NSE SERPL IA-MCNC: 75.9 NG/ML
O2/TOTAL GAS SETTING VFR VENT: 100 %
O2/TOTAL GAS SETTING VFR VENT: 40 %
O2/TOTAL GAS SETTING VFR VENT: 50 %
O2/TOTAL GAS SETTING VFR VENT: 60 %
O2/TOTAL GAS SETTING VFR VENT: 80 %
OPIATES UR QL SCN: ABNORMAL
OXYHGB MFR BLD: 86 % (ref 92–100)
OXYHGB MFR BLD: 89 % (ref 92–100)
OXYHGB MFR BLD: 90 % (ref 92–100)
OXYHGB MFR BLD: 90 % (ref 92–100)
OXYHGB MFR BLD: 92 % (ref 92–100)
OXYHGB MFR BLD: 92 % (ref 92–100)
OXYHGB MFR BLD: 93 % (ref 92–100)
OXYHGB MFR BLD: 95 % (ref 92–100)
OXYHGB MFR BLD: 96 % (ref 92–100)
OXYHGB MFR BLD: 97 % (ref 92–100)
OXYHGB MFR BLD: 98 % (ref 92–100)
OXYHGB MFR BLD: 98 % (ref 92–100)
OXYHGB MFR BLD: 99 % (ref 92–100)
OXYHGB MFR BLDA: 30 % (ref 92–100)
OXYHGB MFR BLDA: 88 % (ref 75–100)
OXYHGB MFR BLDA: 88 % (ref 75–100)
OXYHGB MFR BLDA: 95 % (ref 75–100)
OXYHGB MFR BLDA: 96 % (ref 75–100)
OXYHGB MFR BLDA: 96 % (ref 75–100)
OXYHGB MFR BLDA: 97 % (ref 75–100)
OXYHGB MFR BLDA: 98 % (ref 75–100)
OXYHGB MFR BLDA: 98 % (ref 92–100)
OXYHGB MFR BLDA: 99 % (ref 75–100)
OXYHGB MFR BLDA: 99 % (ref 92–100)
OXYHGB MFR BLDV: 66 %
OXYHGB MFR BLDV: 68 %
OXYHGB MFR BLDV: 68 % (ref 70–75)
OXYHGB MFR BLDV: 70 % (ref 70–75)
OXYHGB MFR BLDV: 71 %
OXYHGB MFR BLDV: 71 % (ref 70–75)
OXYHGB MFR BLDV: 72 % (ref 70–75)
OXYHGB MFR BLDV: 73 %
OXYHGB MFR BLDV: 73 %
OXYHGB MFR BLDV: 73 % (ref 70–75)
OXYHGB MFR BLDV: 73 % (ref 70–75)
OXYHGB MFR BLDV: 74 %
OXYHGB MFR BLDV: 74 %
OXYHGB MFR BLDV: 74 % (ref 70–75)
OXYHGB MFR BLDV: 75 % (ref 70–75)
OXYHGB MFR BLDV: 76 %
OXYHGB MFR BLDV: 76 % (ref 70–75)
OXYHGB MFR BLDV: 77 %
OXYHGB MFR BLDV: 77 % (ref 70–75)
OXYHGB MFR BLDV: 77 % (ref 70–75)
OXYHGB MFR BLDV: 78 % (ref 70–75)
OXYHGB MFR BLDV: 79 %
OXYHGB MFR BLDV: 79 % (ref 70–75)
OXYHGB MFR BLDV: 81 %
OXYHGB MFR BLDV: 82 % (ref 70–75)
OXYHGB MFR BLDV: 83 %
OXYHGB MFR BLDV: 84 %
OXYHGB MFR BLDV: 84 % (ref 70–75)
P AXIS - MUSE: 34 DEGREES
P AXIS - MUSE: 38 DEGREES
P AXIS - MUSE: 41 DEGREES
P AXIS - MUSE: 58 DEGREES
P AXIS - MUSE: 60 DEGREES
PA AA BLD-ACNC: 28 %
PA AA BLD-ACNC: 67 %
PA ADP BLD-ACNC: 100 %
PA ADP BLD-ACNC: 65 %
PCO2 BLD: 26 MM HG (ref 35–45)
PCO2 BLD: 27 MM HG (ref 35–45)
PCO2 BLD: 28 MM HG (ref 35–45)
PCO2 BLD: 29 MM HG (ref 35–45)
PCO2 BLD: 30 MM HG (ref 35–45)
PCO2 BLD: 31 MM HG (ref 35–45)
PCO2 BLD: 32 MM HG (ref 35–45)
PCO2 BLD: 33 MM HG (ref 35–45)
PCO2 BLD: 34 MM HG (ref 35–45)
PCO2 BLD: 34 MM HG (ref 35–45)
PCO2 BLD: 35 MM HG (ref 35–45)
PCO2 BLD: 35 MM HG (ref 35–45)
PCO2 BLD: 36 MM HG (ref 35–45)
PCO2 BLD: 37 MM HG (ref 35–45)
PCO2 BLD: 38 MM HG (ref 35–45)
PCO2 BLD: 39 MM HG (ref 35–45)
PCO2 BLD: 40 MM HG (ref 35–45)
PCO2 BLD: 41 MM HG (ref 35–45)
PCO2 BLD: 42 MM HG (ref 35–45)
PCO2 BLD: 43 MM HG (ref 35–45)
PCO2 BLD: 43 MM HG (ref 35–45)
PCO2 BLD: 44 MM HG (ref 35–45)
PCO2 BLD: 45 MM HG (ref 35–45)
PCO2 BLD: 50 MM HG (ref 35–45)
PCO2 BLD: 52 MM HG (ref 35–45)
PCO2 BLD: 56 MM HG (ref 35–45)
PCO2 BLDA: 117 MM HG (ref 35–45)
PCO2 BLDA: 25 MM HG (ref 35–45)
PCO2 BLDA: 29 MM HG (ref 35–45)
PCO2 BLDA: 35 MM HG (ref 35–45)
PCO2 BLDA: 36 MM HG (ref 35–45)
PCO2 BLDA: 37 MM HG (ref 35–45)
PCO2 BLDA: 38 MM HG (ref 35–45)
PCO2 BLDA: 39 MM HG (ref 35–45)
PCO2 BLDA: 39 MM HG (ref 35–45)
PCO2 BLDA: 40 MM HG (ref 35–45)
PCO2 BLDA: 41 MM HG (ref 35–45)
PCO2 BLDA: 42 MM HG (ref 35–45)
PCO2 BLDA: 43 MM HG (ref 35–45)
PCO2 BLDA: 44 MM HG (ref 35–45)
PCO2 BLDA: 45 MM HG (ref 35–45)
PCO2 BLDA: 46 MM HG (ref 35–45)
PCO2 BLDA: 47 MM HG (ref 35–45)
PCO2 BLDA: 48 MM HG (ref 35–45)
PCO2 BLDA: 48 MM HG (ref 35–45)
PCO2 BLDA: 51 MM HG (ref 35–45)
PCO2 BLDA: 71 MM HG (ref 35–45)
PCO2 BLDV: 31 MM HG (ref 40–50)
PCO2 BLDV: 32 MM HG (ref 40–50)
PCO2 BLDV: 33 MM HG (ref 40–50)
PCO2 BLDV: 34 MM HG (ref 40–50)
PCO2 BLDV: 34 MM HG (ref 40–50)
PCO2 BLDV: 35 MM HG (ref 40–50)
PCO2 BLDV: 36 MM HG (ref 40–50)
PCO2 BLDV: 37 MM HG (ref 40–50)
PCO2 BLDV: 38 MM HG (ref 40–50)
PCO2 BLDV: 39 MM HG (ref 40–50)
PCO2 BLDV: 39 MM HG (ref 40–50)
PCO2 BLDV: 41 MM HG (ref 40–50)
PCO2 BLDV: 44 MM HG (ref 40–50)
PCO2 BLDV: 44 MM HG (ref 40–50)
PCO2 BLDV: 46 MM HG (ref 40–50)
PCO2 BLDV: 47 MM HG (ref 40–50)
PCO2 BLDV: 48 MM HG (ref 40–50)
PCO2 BLDV: 48 MM HG (ref 40–50)
PCO2 BLDV: 49 MM HG (ref 40–50)
PCO2 BLDV: 51 MM HG (ref 40–50)
PCO2 BLDV: 51 MM HG (ref 40–50)
PCO2 BLDV: 52 MM HG (ref 40–50)
PF4 HEPARIN CMPLX AB SER QL: NEGATIVE
PH BLD: 7.13 [PH] (ref 7.35–7.45)
PH BLD: 7.14 [PH] (ref 7.35–7.45)
PH BLD: 7.18 [PH] (ref 7.35–7.45)
PH BLD: 7.21 [PH] (ref 7.35–7.45)
PH BLD: 7.22 [PH] (ref 7.35–7.45)
PH BLD: 7.22 [PH] (ref 7.35–7.45)
PH BLD: 7.24 [PH] (ref 7.35–7.45)
PH BLD: 7.24 [PH] (ref 7.35–7.45)
PH BLD: 7.27 [PH] (ref 7.35–7.45)
PH BLD: 7.28 [PH] (ref 7.35–7.45)
PH BLD: 7.29 [PH] (ref 7.35–7.45)
PH BLD: 7.3 [PH] (ref 7.35–7.45)
PH BLD: 7.31 [PH] (ref 7.35–7.45)
PH BLD: 7.31 [PH] (ref 7.35–7.45)
PH BLD: 7.32 [PH] (ref 7.35–7.45)
PH BLD: 7.34 [PH] (ref 7.35–7.45)
PH BLD: 7.35 [PH] (ref 7.35–7.45)
PH BLD: 7.36 [PH] (ref 7.35–7.45)
PH BLD: 7.37 [PH] (ref 7.35–7.45)
PH BLD: 7.38 [PH] (ref 7.35–7.45)
PH BLD: 7.39 [PH] (ref 7.35–7.45)
PH BLD: 7.4 [PH] (ref 7.35–7.45)
PH BLD: 7.41 [PH] (ref 7.35–7.45)
PH BLD: 7.42 [PH] (ref 7.35–7.45)
PH BLD: 7.43 [PH] (ref 7.35–7.45)
PH BLD: 7.44 [PH] (ref 7.35–7.45)
PH BLD: 7.45 [PH] (ref 7.35–7.45)
PH BLDA: 6.78 [PH] (ref 7.35–7.45)
PH BLDA: 7.06 [PH] (ref 7.35–7.45)
PH BLDA: 7.1 [PH] (ref 7.35–7.45)
PH BLDA: 7.24 [PH] (ref 7.35–7.45)
PH BLDA: 7.25 [PH] (ref 7.35–7.45)
PH BLDA: 7.27 [PH] (ref 7.35–7.45)
PH BLDA: 7.27 [PH] (ref 7.35–7.45)
PH BLDA: 7.3 [PH] (ref 7.35–7.45)
PH BLDA: 7.32 [PH] (ref 7.35–7.45)
PH BLDA: 7.33 [PH] (ref 7.35–7.45)
PH BLDA: 7.34 [PH] (ref 7.35–7.45)
PH BLDA: 7.34 [PH] (ref 7.35–7.45)
PH BLDA: 7.35 [PH] (ref 7.35–7.45)
PH BLDA: 7.35 [PH] (ref 7.35–7.45)
PH BLDA: 7.36 [PH] (ref 7.35–7.45)
PH BLDA: 7.36 [PH] (ref 7.35–7.45)
PH BLDA: 7.37 [PH] (ref 7.35–7.45)
PH BLDA: 7.37 [PH] (ref 7.35–7.45)
PH BLDA: 7.38 [PH] (ref 7.35–7.45)
PH BLDA: 7.4 [PH] (ref 7.35–7.45)
PH BLDA: 7.4 [PH] (ref 7.35–7.45)
PH BLDA: 7.42 [PH] (ref 7.35–7.45)
PH BLDV: 7.21 [PH] (ref 7.32–7.43)
PH BLDV: 7.23 [PH] (ref 7.32–7.43)
PH BLDV: 7.24 [PH] (ref 7.32–7.43)
PH BLDV: 7.25 [PH] (ref 7.32–7.43)
PH BLDV: 7.3 [PH] (ref 7.32–7.43)
PH BLDV: 7.31 [PH] (ref 7.32–7.43)
PH BLDV: 7.32 [PH] (ref 7.32–7.43)
PH BLDV: 7.33 [PH] (ref 7.32–7.43)
PH BLDV: 7.34 [PH] (ref 7.32–7.43)
PH BLDV: 7.35 [PH] (ref 7.32–7.43)
PH BLDV: 7.36 [PH] (ref 7.32–7.43)
PH BLDV: 7.37 [PH] (ref 7.32–7.43)
PH BLDV: 7.38 [PH] (ref 7.32–7.43)
PH BLDV: 7.39 [PH] (ref 7.32–7.43)
PH BLDV: 7.4 [PH] (ref 7.32–7.43)
PH BLDV: 7.41 [PH] (ref 7.32–7.43)
PH BLDV: 7.41 [PH] (ref 7.32–7.43)
PH BLDV: 7.42 [PH] (ref 7.32–7.43)
PH BLDV: 7.42 [PH] (ref 7.32–7.43)
PH BLDV: 7.43 [PH] (ref 7.32–7.43)
PH BLDV: 7.43 [PH] (ref 7.32–7.43)
PH UR STRIP: 6.5 [PH] (ref 5–7)
PH UR STRIP: 7.5 [PH] (ref 5–7)
PH UR STRIP: 8.5 [PH] (ref 5–7)
PHOSPHATE SERPL-MCNC: 2.4 MG/DL (ref 2.5–4.5)
PHOSPHATE SERPL-MCNC: 4.4 MG/DL (ref 2.5–4.5)
PHOSPHATE SERPL-MCNC: 4.8 MG/DL (ref 2.5–4.5)
PHOSPHATE SERPL-MCNC: 4.8 MG/DL (ref 2.5–4.5)
PHOSPHATE SERPL-MCNC: 4.9 MG/DL (ref 2.5–4.5)
PHOSPHATE SERPL-MCNC: 4.9 MG/DL (ref 2.5–4.5)
PHOSPHATE SERPL-MCNC: 5 MG/DL (ref 2.5–4.5)
PHOSPHATE SERPL-MCNC: 5.2 MG/DL (ref 2.5–4.5)
PHOSPHATE SERPL-MCNC: 5.3 MG/DL (ref 2.5–4.5)
PHOSPHATE SERPL-MCNC: 5.3 MG/DL (ref 2.5–4.5)
PHOSPHATE SERPL-MCNC: 5.4 MG/DL (ref 2.5–4.5)
PHOSPHATE SERPL-MCNC: 5.4 MG/DL (ref 2.5–4.5)
PHOSPHATE SERPL-MCNC: 5.5 MG/DL (ref 2.5–4.5)
PHOSPHATE SERPL-MCNC: 5.8 MG/DL (ref 2.5–4.5)
PHOSPHATE SERPL-MCNC: 5.8 MG/DL (ref 2.5–4.5)
PHOSPHATE SERPL-MCNC: 5.9 MG/DL (ref 2.5–4.5)
PHOSPHATE SERPL-MCNC: 5.9 MG/DL (ref 2.5–4.5)
PHOSPHATE SERPL-MCNC: 6 MG/DL (ref 2.5–4.5)
PHOSPHATE SERPL-MCNC: 6.2 MG/DL (ref 2.5–4.5)
PHOSPHATE SERPL-MCNC: 6.4 MG/DL (ref 2.5–4.5)
PHOSPHATE SERPL-MCNC: 7 MG/DL (ref 2.5–4.5)
PHOSPHATE SERPL-MCNC: 7.9 MG/DL (ref 2.5–4.5)
PLAT MORPH BLD: ABNORMAL
PLATELET # BLD AUTO: 103 10E3/UL (ref 150–450)
PLATELET # BLD AUTO: 105 10E3/UL (ref 150–450)
PLATELET # BLD AUTO: 111 10E3/UL (ref 150–450)
PLATELET # BLD AUTO: 111 10E3/UL (ref 150–450)
PLATELET # BLD AUTO: 113 10E3/UL (ref 150–450)
PLATELET # BLD AUTO: 119 10E3/UL (ref 150–450)
PLATELET # BLD AUTO: 122 10E3/UL (ref 150–450)
PLATELET # BLD AUTO: 143 10E3/UL (ref 150–450)
PLATELET # BLD AUTO: 149 10E3/UL (ref 150–450)
PLATELET # BLD AUTO: 182 10E3/UL (ref 150–450)
PLATELET # BLD AUTO: 189 10E3/UL (ref 150–450)
PLATELET # BLD AUTO: 211 10E3/UL (ref 150–450)
PLATELET # BLD AUTO: 220 10E3/UL (ref 150–450)
PLATELET # BLD AUTO: 259 10E3/UL (ref 150–450)
PLATELET # BLD AUTO: 60 10E3/UL (ref 150–450)
PLATELET # BLD AUTO: 61 10E3/UL (ref 150–450)
PLATELET # BLD AUTO: 62 10E3/UL (ref 150–450)
PLATELET # BLD AUTO: 62 10E3/UL (ref 150–450)
PLATELET # BLD AUTO: 63 10E3/UL (ref 150–450)
PLATELET # BLD AUTO: 64 10E3/UL (ref 150–450)
PLATELET # BLD AUTO: 66 10E3/UL (ref 150–450)
PLATELET # BLD AUTO: 66 10E3/UL (ref 150–450)
PLATELET # BLD AUTO: 69 10E3/UL (ref 150–450)
PLATELET # BLD AUTO: 69 10E3/UL (ref 150–450)
PLATELET # BLD AUTO: 70 10E3/UL (ref 150–450)
PLATELET # BLD AUTO: 74 10E3/UL (ref 150–450)
PLATELET # BLD AUTO: 78 10E3/UL (ref 150–450)
PLATELET # BLD AUTO: 79 10E3/UL (ref 150–450)
PLATELET # BLD AUTO: 81 10E3/UL (ref 150–450)
PLATELET # BLD AUTO: 81 10E3/UL (ref 150–450)
PLATELET # BLD AUTO: 85 10E3/UL (ref 150–450)
PLATELET # BLD AUTO: 86 10E3/UL (ref 150–450)
PLATELET # BLD AUTO: 87 10E3/UL (ref 150–450)
PLATELET # BLD AUTO: 90 10E3/UL (ref 150–450)
PLATELET # BLD AUTO: 91 10E3/UL (ref 150–450)
PLATELET # BLD AUTO: 94 10E3/UL (ref 150–450)
PLATELET # BLD AUTO: 95 10E3/UL (ref 150–450)
PO2 BLD: 100 MM HG (ref 80–105)
PO2 BLD: 100 MM HG (ref 80–105)
PO2 BLD: 101 MM HG (ref 80–105)
PO2 BLD: 101 MM HG (ref 80–105)
PO2 BLD: 102 MM HG (ref 80–105)
PO2 BLD: 103 MM HG (ref 80–105)
PO2 BLD: 104 MM HG (ref 80–105)
PO2 BLD: 105 MM HG (ref 80–105)
PO2 BLD: 107 MM HG (ref 80–105)
PO2 BLD: 107 MM HG (ref 80–105)
PO2 BLD: 108 MM HG (ref 80–105)
PO2 BLD: 109 MM HG (ref 80–105)
PO2 BLD: 110 MM HG (ref 80–105)
PO2 BLD: 111 MM HG (ref 80–105)
PO2 BLD: 112 MM HG (ref 80–105)
PO2 BLD: 112 MM HG (ref 80–105)
PO2 BLD: 114 MM HG (ref 80–105)
PO2 BLD: 115 MM HG (ref 80–105)
PO2 BLD: 115 MM HG (ref 80–105)
PO2 BLD: 116 MM HG (ref 80–105)
PO2 BLD: 117 MM HG (ref 80–105)
PO2 BLD: 118 MM HG (ref 80–105)
PO2 BLD: 118 MM HG (ref 80–105)
PO2 BLD: 119 MM HG (ref 80–105)
PO2 BLD: 121 MM HG (ref 80–105)
PO2 BLD: 122 MM HG (ref 80–105)
PO2 BLD: 122 MM HG (ref 80–105)
PO2 BLD: 123 MM HG (ref 80–105)
PO2 BLD: 124 MM HG (ref 80–105)
PO2 BLD: 124 MM HG (ref 80–105)
PO2 BLD: 125 MM HG (ref 80–105)
PO2 BLD: 127 MM HG (ref 80–105)
PO2 BLD: 128 MM HG (ref 80–105)
PO2 BLD: 128 MM HG (ref 80–105)
PO2 BLD: 129 MM HG (ref 80–105)
PO2 BLD: 132 MM HG (ref 80–105)
PO2 BLD: 133 MM HG (ref 80–105)
PO2 BLD: 133 MM HG (ref 80–105)
PO2 BLD: 139 MM HG (ref 80–105)
PO2 BLD: 139 MM HG (ref 80–105)
PO2 BLD: 144 MM HG (ref 80–105)
PO2 BLD: 148 MM HG (ref 80–105)
PO2 BLD: 150 MM HG (ref 80–105)
PO2 BLD: 160 MM HG (ref 80–105)
PO2 BLD: 161 MM HG (ref 80–105)
PO2 BLD: 170 MM HG (ref 80–105)
PO2 BLD: 174 MM HG (ref 80–105)
PO2 BLD: 192 MM HG (ref 80–105)
PO2 BLD: 193 MM HG (ref 80–105)
PO2 BLD: 209 MM HG (ref 80–105)
PO2 BLD: 307 MM HG (ref 80–105)
PO2 BLD: 52 MM HG (ref 80–105)
PO2 BLD: 60 MM HG (ref 80–105)
PO2 BLD: 60 MM HG (ref 80–105)
PO2 BLD: 63 MM HG (ref 80–105)
PO2 BLD: 66 MM HG (ref 80–105)
PO2 BLD: 69 MM HG (ref 80–105)
PO2 BLD: 70 MM HG (ref 80–105)
PO2 BLD: 73 MM HG (ref 80–105)
PO2 BLD: 74 MM HG (ref 80–105)
PO2 BLD: 75 MM HG (ref 80–105)
PO2 BLD: 78 MM HG (ref 80–105)
PO2 BLD: 80 MM HG (ref 80–105)
PO2 BLD: 80 MM HG (ref 80–105)
PO2 BLD: 82 MM HG (ref 80–105)
PO2 BLD: 83 MM HG (ref 80–105)
PO2 BLD: 84 MM HG (ref 80–105)
PO2 BLD: 86 MM HG (ref 80–105)
PO2 BLD: 86 MM HG (ref 80–105)
PO2 BLD: 88 MM HG (ref 80–105)
PO2 BLD: 88 MM HG (ref 80–105)
PO2 BLD: 89 MM HG (ref 80–105)
PO2 BLD: 90 MM HG (ref 80–105)
PO2 BLD: 90 MM HG (ref 80–105)
PO2 BLD: 92 MM HG (ref 80–105)
PO2 BLD: 93 MM HG (ref 80–105)
PO2 BLD: 93 MM HG (ref 80–105)
PO2 BLD: 94 MM HG (ref 80–105)
PO2 BLD: 96 MM HG (ref 80–105)
PO2 BLD: 98 MM HG (ref 80–105)
PO2 BLD: 99 MM HG (ref 80–105)
PO2 BLDA: 103 MM HG (ref 80–105)
PO2 BLDA: 112 MM HG (ref 80–105)
PO2 BLDA: 113 MM HG (ref 80–105)
PO2 BLDA: 117 MM HG (ref 80–105)
PO2 BLDA: 149 MM HG (ref 80–105)
PO2 BLDA: 150 MM HG (ref 80–105)
PO2 BLDA: 153 MM HG (ref 80–105)
PO2 BLDA: 170 MM HG (ref 80–105)
PO2 BLDA: 175 MM HG (ref 80–105)
PO2 BLDA: 178 MM HG (ref 80–105)
PO2 BLDA: 182 MM HG (ref 80–105)
PO2 BLDA: 195 MM HG (ref 80–105)
PO2 BLDA: 221 MM HG (ref 80–105)
PO2 BLDA: 223 MM HG (ref 80–105)
PO2 BLDA: 257 MM HG (ref 80–105)
PO2 BLDA: 266 MM HG (ref 80–105)
PO2 BLDA: 288 MM HG (ref 80–105)
PO2 BLDA: 36 MM HG (ref 80–105)
PO2 BLDA: 378 MM HG (ref 80–105)
PO2 BLDA: 483 MM HG (ref 80–105)
PO2 BLDA: 551 MM HG (ref 80–105)
PO2 BLDA: 61 MM HG (ref 80–105)
PO2 BLDA: 63 MM HG (ref 80–105)
PO2 BLDA: 95 MM HG (ref 80–105)
PO2 BLDV: 37 MM HG (ref 25–47)
PO2 BLDV: 39 MM HG (ref 25–47)
PO2 BLDV: 39 MM HG (ref 25–47)
PO2 BLDV: 40 MM HG (ref 25–47)
PO2 BLDV: 41 MM HG (ref 25–47)
PO2 BLDV: 42 MM HG (ref 25–47)
PO2 BLDV: 43 MM HG (ref 25–47)
PO2 BLDV: 44 MM HG (ref 25–47)
PO2 BLDV: 45 MM HG (ref 25–47)
PO2 BLDV: 46 MM HG (ref 25–47)
PO2 BLDV: 47 MM HG (ref 25–47)
PO2 BLDV: 47 MM HG (ref 25–47)
PO2 BLDV: 48 MM HG (ref 25–47)
PO2 BLDV: 48 MM HG (ref 25–47)
PO2 BLDV: 49 MM HG (ref 25–47)
PO2 BLDV: 49 MM HG (ref 25–47)
PO2 BLDV: 51 MM HG (ref 25–47)
PO2 BLDV: 51 MM HG (ref 25–47)
PO2 BLDV: 52 MM HG (ref 25–47)
PO2 BLDV: 54 MM HG (ref 25–47)
PO2 BLDV: 56 MM HG (ref 25–47)
POTASSIUM BLD-SCNC: 2.6 MMOL/L (ref 3.4–5.3)
POTASSIUM BLD-SCNC: 2.9 MMOL/L (ref 3.4–5.3)
POTASSIUM BLD-SCNC: 2.9 MMOL/L (ref 3.5–5)
POTASSIUM BLD-SCNC: 3 MMOL/L (ref 3.4–5.3)
POTASSIUM BLD-SCNC: 3.1 MMOL/L (ref 3.5–5)
POTASSIUM BLD-SCNC: 3.5 MMOL/L (ref 3.4–5.3)
POTASSIUM BLD-SCNC: 3.6 MMOL/L (ref 3.4–5.3)
POTASSIUM BLD-SCNC: 3.6 MMOL/L (ref 3.5–5)
POTASSIUM BLD-SCNC: 3.7 MMOL/L (ref 3.4–5.3)
POTASSIUM BLD-SCNC: 3.8 MMOL/L (ref 3.4–5.3)
POTASSIUM BLD-SCNC: 3.8 MMOL/L (ref 3.4–5.3)
POTASSIUM BLD-SCNC: 4 MMOL/L (ref 3.4–5.3)
POTASSIUM BLD-SCNC: 4 MMOL/L (ref 3.4–5.3)
POTASSIUM BLD-SCNC: 4.2 MMOL/L (ref 3.4–5.3)
POTASSIUM BLD-SCNC: 4.3 MMOL/L (ref 3.4–5.3)
POTASSIUM BLD-SCNC: 4.4 MMOL/L (ref 3.4–5.3)
POTASSIUM BLD-SCNC: 4.5 MMOL/L (ref 3.4–5.3)
POTASSIUM BLD-SCNC: 4.6 MMOL/L (ref 3.4–5.3)
POTASSIUM BLD-SCNC: 4.7 MMOL/L (ref 3.4–5.3)
POTASSIUM BLD-SCNC: 4.7 MMOL/L (ref 3.4–5.3)
POTASSIUM BLD-SCNC: 4.8 MMOL/L (ref 3.4–5.3)
POTASSIUM BLD-SCNC: 4.9 MMOL/L (ref 3.4–5.3)
POTASSIUM BLD-SCNC: 4.9 MMOL/L (ref 3.4–5.3)
POTASSIUM BLD-SCNC: 5 MMOL/L (ref 3.4–5.3)
POTASSIUM BLD-SCNC: 5.1 MMOL/L (ref 3.4–5.3)
POTASSIUM BLD-SCNC: 5.1 MMOL/L (ref 3.5–5)
POTASSIUM BLD-SCNC: 5.2 MMOL/L (ref 3.4–5.3)
POTASSIUM BLD-SCNC: 5.4 MMOL/L (ref 3.4–5.3)
POTASSIUM BLD-SCNC: 5.7 MMOL/L (ref 3.4–5.3)
POTASSIUM BLD-SCNC: 6.2 MMOL/L (ref 3.4–5.3)
POTASSIUM BLD-SCNC: 6.2 MMOL/L (ref 3.4–5.3)
POTASSIUM BLD-SCNC: 9.5 MMOL/L (ref 3.5–5)
PR INTERVAL - MUSE: 142 MS
PR INTERVAL - MUSE: 146 MS
PR INTERVAL - MUSE: 150 MS
PR INTERVAL - MUSE: 150 MS
PR INTERVAL - MUSE: 174 MS
PROCALCITONIN SERPL-MCNC: 0.38 NG/ML
PROCALCITONIN SERPL-MCNC: 159.63 NG/ML
PROT SERPL-MCNC: 4.4 G/DL (ref 6.8–8.8)
PROT SERPL-MCNC: 4.5 G/DL (ref 6.8–8.8)
PROT SERPL-MCNC: 4.6 G/DL (ref 6.8–8.8)
PROT SERPL-MCNC: 4.7 G/DL (ref 6.8–8.8)
PROT SERPL-MCNC: 4.7 G/DL (ref 6.8–8.8)
PROT SERPL-MCNC: 4.8 G/DL (ref 6.8–8.8)
PROT SERPL-MCNC: 4.9 G/DL (ref 6.8–8.8)
PROT SERPL-MCNC: 5 G/DL (ref 6.8–8.8)
PROT SERPL-MCNC: 5 G/DL (ref 6.8–8.8)
PROT SERPL-MCNC: 5.1 G/DL (ref 6.8–8.8)
PROT SERPL-MCNC: 5.2 G/DL (ref 6.8–8.8)
PROT SERPL-MCNC: 5.2 G/DL (ref 6.8–8.8)
PROT SERPL-MCNC: 5.3 G/DL (ref 6.8–8.8)
PROT SERPL-MCNC: 5.4 G/DL (ref 6.8–8.8)
PROT SERPL-MCNC: 5.5 G/DL (ref 6.8–8.8)
PROT SERPL-MCNC: 5.6 G/DL (ref 6.8–8.8)
PROT SERPL-MCNC: 5.7 G/DL (ref 6.8–8.8)
PROT SERPL-MCNC: 5.8 G/DL (ref 6.8–8.8)
PROT SERPL-MCNC: 5.8 G/DL (ref 6.8–8.8)
PROT SERPL-MCNC: 5.9 G/DL (ref 6.8–8.8)
PROT SERPL-MCNC: 6.5 G/DL (ref 6.8–8.8)
QRS DURATION - MUSE: 66 MS
QRS DURATION - MUSE: 74 MS
QRS DURATION - MUSE: 78 MS
QRS DURATION - MUSE: 84 MS
QRS DURATION - MUSE: 88 MS
QT - MUSE: 328 MS
QT - MUSE: 364 MS
QT - MUSE: 384 MS
QT - MUSE: 408 MS
QT - MUSE: 422 MS
QTC - MUSE: 396 MS
QTC - MUSE: 403 MS
QTC - MUSE: 455 MS
QTC - MUSE: 490 MS
QTC - MUSE: 495 MS
R AXIS - MUSE: 61 DEGREES
R AXIS - MUSE: 65 DEGREES
R AXIS - MUSE: 70 DEGREES
R AXIS - MUSE: 73 DEGREES
R AXIS - MUSE: 75 DEGREES
RBC # BLD AUTO: 2.12 10E6/UL (ref 4.4–5.9)
RBC # BLD AUTO: 2.15 10E6/UL (ref 4.4–5.9)
RBC # BLD AUTO: 2.23 10E6/UL (ref 4.4–5.9)
RBC # BLD AUTO: 2.24 10E6/UL (ref 4.4–5.9)
RBC # BLD AUTO: 2.27 10E6/UL (ref 4.4–5.9)
RBC # BLD AUTO: 2.28 10E6/UL (ref 4.4–5.9)
RBC # BLD AUTO: 2.35 10E6/UL (ref 4.4–5.9)
RBC # BLD AUTO: 2.35 10E6/UL (ref 4.4–5.9)
RBC # BLD AUTO: 2.36 10E6/UL (ref 4.4–5.9)
RBC # BLD AUTO: 2.4 10E6/UL (ref 4.4–5.9)
RBC # BLD AUTO: 2.44 10E6/UL (ref 4.4–5.9)
RBC # BLD AUTO: 2.5 10E6/UL (ref 4.4–5.9)
RBC # BLD AUTO: 2.5 10E6/UL (ref 4.4–5.9)
RBC # BLD AUTO: 2.52 10E6/UL (ref 4.4–5.9)
RBC # BLD AUTO: 2.55 10E6/UL (ref 4.4–5.9)
RBC # BLD AUTO: 2.55 10E6/UL (ref 4.4–5.9)
RBC # BLD AUTO: 2.58 10E6/UL (ref 4.4–5.9)
RBC # BLD AUTO: 2.67 10E6/UL (ref 4.4–5.9)
RBC # BLD AUTO: 2.7 10E6/UL (ref 4.4–5.9)
RBC # BLD AUTO: 2.72 10E6/UL (ref 4.4–5.9)
RBC # BLD AUTO: 2.74 10E6/UL (ref 4.4–5.9)
RBC # BLD AUTO: 2.74 10E6/UL (ref 4.4–5.9)
RBC # BLD AUTO: 2.88 10E6/UL (ref 4.4–5.9)
RBC # BLD AUTO: 2.91 10E6/UL (ref 4.4–5.9)
RBC # BLD AUTO: 2.92 10E6/UL (ref 4.4–5.9)
RBC # BLD AUTO: 2.96 10E6/UL (ref 4.4–5.9)
RBC # BLD AUTO: 2.97 10E6/UL (ref 4.4–5.9)
RBC # BLD AUTO: 2.99 10E6/UL (ref 4.4–5.9)
RBC # BLD AUTO: 3.05 10E6/UL (ref 4.4–5.9)
RBC # BLD AUTO: 3.14 10E6/UL (ref 4.4–5.9)
RBC # BLD AUTO: 3.35 10E6/UL (ref 4.4–5.9)
RBC # BLD AUTO: 3.57 10E6/UL (ref 4.4–5.9)
RBC # BLD AUTO: 3.73 10E6/UL (ref 4.4–5.9)
RBC # BLD AUTO: 3.87 10E6/UL (ref 4.4–5.9)
RBC # BLD AUTO: 3.92 10E6/UL (ref 3.8–5.9)
RBC # BLD AUTO: 4.02 10E6/UL (ref 4.4–5.9)
RBC # BLD AUTO: 4.04 10E6/UL (ref 4.4–5.9)
RBC # BLD AUTO: 4.1 10E6/UL (ref 3.8–5.9)
RBC # BLD AUTO: 4.12 10E6/UL (ref 3.8–5.9)
RBC # BLD AUTO: 4.2 10E6/UL (ref 4.4–5.9)
RBC # BLD AUTO: 4.34 10E6/UL (ref 3.8–5.9)
RBC MORPH BLD: ABNORMAL
RBC URINE: 3 /HPF
RBC URINE: 40 /HPF
RBC URINE: 62 /HPF
SA TARGET DNA: POSITIVE
SARS-COV-2 RNA RESP QL NAA+PROBE: NEGATIVE
SARS-COV-2 RNA RESP QL NAA+PROBE: NEGATIVE
SODIUM BLD-SCNC: 142 MMOL/L (ref 133–144)
SODIUM BLD-SCNC: 144 MMOL/L (ref 133–144)
SODIUM BLD-SCNC: 150 MMOL/L (ref 133–144)
SODIUM BLD-SCNC: 151 MMOL/L (ref 133–144)
SODIUM BLD-SCNC: 152 MMOL/L (ref 133–144)
SODIUM SERPL-SCNC: 139 MMOL/L (ref 133–144)
SODIUM SERPL-SCNC: 139 MMOL/L (ref 133–144)
SODIUM SERPL-SCNC: 140 MMOL/L (ref 133–144)
SODIUM SERPL-SCNC: 141 MMOL/L (ref 133–144)
SODIUM SERPL-SCNC: 142 MMOL/L (ref 133–144)
SODIUM SERPL-SCNC: 143 MMOL/L (ref 133–144)
SODIUM SERPL-SCNC: 144 MMOL/L (ref 133–144)
SODIUM SERPL-SCNC: 145 MMOL/L (ref 133–144)
SODIUM SERPL-SCNC: 146 MMOL/L (ref 133–144)
SODIUM SERPL-SCNC: 147 MMOL/L (ref 133–144)
SODIUM SERPL-SCNC: 147 MMOL/L (ref 133–144)
SODIUM SERPL-SCNC: 148 MMOL/L (ref 133–144)
SODIUM SERPL-SCNC: 149 MMOL/L (ref 133–144)
SODIUM SERPL-SCNC: 149 MMOL/L (ref 133–144)
SODIUM SERPL-SCNC: 150 MMOL/L (ref 133–144)
SODIUM SERPL-SCNC: 151 MMOL/L (ref 133–144)
SODIUM SERPL-SCNC: 152 MMOL/L (ref 133–144)
SP GR UR STRIP: 1.01 (ref 1–1.03)
SPECIMEN EXPIRATION DATE: NORMAL
SQUAMOUS EPITHELIAL: 1 /HPF
SYSTOLIC BLOOD PRESSURE - MUSE: NORMAL MMHG
T AXIS - MUSE: -4 DEGREES
T AXIS - MUSE: 40 DEGREES
T AXIS - MUSE: 50 DEGREES
T AXIS - MUSE: 61 DEGREES
T AXIS - MUSE: 90 DEGREES
TRANSITIONAL EPI: 1 /HPF
TRANSITIONAL EPI: <1 /HPF
TROPONIN I SERPL HS-MCNC: 3804 NG/L
TROPONIN I SERPL HS-MCNC: 5249 NG/L
TROPONIN I SERPL HS-MCNC: 7804 NG/L
TROPONIN I SERPL HS-MCNC: 8449 NG/L
TROPONIN I SERPL HS-MCNC: 8460 NG/L
TROPONIN I SERPL HS-MCNC: 9694 NG/L
TROPONIN I SERPL HS-MCNC: 9914 NG/L
TROPONIN I SERPL HS-MCNC: ABNORMAL NG/L
UFH PPP CHRO-ACNC: 0.1 IU/ML
UFH PPP CHRO-ACNC: 0.12 IU/ML
UFH PPP CHRO-ACNC: 0.13 IU/ML
UFH PPP CHRO-ACNC: 0.2 IU/ML
UFH PPP CHRO-ACNC: 0.25 IU/ML
UFH PPP CHRO-ACNC: 0.34 IU/ML
UFH PPP CHRO-ACNC: 0.86 IU/ML
UFH PPP CHRO-ACNC: <0.1 IU/ML
UNIT ABO/RH: NORMAL
UNIT NUMBER: NORMAL
UNIT STATUS: NORMAL
UNIT TYPE ISBT: 5100
UNIT TYPE ISBT: 9500
UROBILINOGEN UR STRIP-MCNC: NORMAL MG/DL
VANCOMYCIN SERPL-MCNC: 18.8 MG/L
VANCOMYCIN SERPL-MCNC: 20.9 MG/L
VANCOMYCIN SERPL-MCNC: 31 MG/L
VENTRICULAR RATE- MUSE: 100 BPM
VENTRICULAR RATE- MUSE: 59 BPM
VENTRICULAR RATE- MUSE: 81 BPM
VENTRICULAR RATE- MUSE: 88 BPM
VENTRICULAR RATE- MUSE: 94 BPM
WBC # BLD AUTO: 15.9 10E3/UL (ref 4–11)
WBC # BLD AUTO: 16.4 10E3/UL (ref 4–11)
WBC # BLD AUTO: 16.4 10E3/UL (ref 4–11)
WBC # BLD AUTO: 16.6 10E3/UL (ref 4–11)
WBC # BLD AUTO: 16.7 10E3/UL (ref 4–11)
WBC # BLD AUTO: 16.7 10E3/UL (ref 4–11)
WBC # BLD AUTO: 16.8 10E3/UL (ref 4–11)
WBC # BLD AUTO: 16.8 10E3/UL (ref 4–11)
WBC # BLD AUTO: 17.1 10E3/UL (ref 4–11)
WBC # BLD AUTO: 17.1 10E3/UL (ref 4–11)
WBC # BLD AUTO: 17.2 10E3/UL (ref 4–11)
WBC # BLD AUTO: 17.7 10E3/UL (ref 4–11)
WBC # BLD AUTO: 17.7 10E3/UL (ref 4–11)
WBC # BLD AUTO: 17.8 10E3/UL (ref 4–11)
WBC # BLD AUTO: 17.9 10E3/UL (ref 4–11)
WBC # BLD AUTO: 18 10E3/UL (ref 4–11)
WBC # BLD AUTO: 18.1 10E3/UL (ref 4–11)
WBC # BLD AUTO: 18.2 10E3/UL (ref 4–11)
WBC # BLD AUTO: 18.3 10E3/UL (ref 4–11)
WBC # BLD AUTO: 18.3 10E3/UL (ref 4–11)
WBC # BLD AUTO: 18.4 10E3/UL (ref 4–11)
WBC # BLD AUTO: 18.4 10E3/UL (ref 4–11)
WBC # BLD AUTO: 18.5 10E3/UL (ref 4–11)
WBC # BLD AUTO: 18.5 10E3/UL (ref 4–11)
WBC # BLD AUTO: 18.8 10E3/UL (ref 4–11)
WBC # BLD AUTO: 19 10E3/UL (ref 4–11)
WBC # BLD AUTO: 19.2 10E3/UL (ref 4–11)
WBC # BLD AUTO: 19.2 10E3/UL (ref 4–11)
WBC # BLD AUTO: 19.9 10E3/UL (ref 4–11)
WBC # BLD AUTO: 20.5 10E3/UL (ref 4–11)
WBC # BLD AUTO: 20.8 10E3/UL (ref 4–11)
WBC # BLD AUTO: 21 10E3/UL (ref 4–11)
WBC # BLD AUTO: 21.2 10E3/UL (ref 4–11)
WBC # BLD AUTO: 21.5 10E3/UL (ref 4–11)
WBC # BLD AUTO: 21.6 10E3/UL (ref 4–11)
WBC # BLD AUTO: 22 10E3/UL (ref 4–11)
WBC # BLD AUTO: 23.7 10E3/UL (ref 4–11)
WBC # BLD AUTO: 25.9 10E3/UL (ref 4–11)
WBC CLUMPS #/AREA URNS HPF: PRESENT /HPF
WBC URINE: 19 /HPF
WBC URINE: 2 /HPF
WBC URINE: 44 /HPF

## 2022-01-01 PROCEDURE — 250N000011 HC RX IP 250 OP 636: Performed by: STUDENT IN AN ORGANIZED HEALTH CARE EDUCATION/TRAINING PROGRAM

## 2022-01-01 PROCEDURE — 82330 ASSAY OF CALCIUM: CPT | Performed by: NURSE PRACTITIONER

## 2022-01-01 PROCEDURE — 80053 COMPREHEN METABOLIC PANEL: CPT | Performed by: NURSE PRACTITIONER

## 2022-01-01 PROCEDURE — 84100 ASSAY OF PHOSPHORUS: CPT

## 2022-01-01 PROCEDURE — 93010 ELECTROCARDIOGRAM REPORT: CPT | Mod: 59 | Performed by: INTERNAL MEDICINE

## 2022-01-01 PROCEDURE — 84155 ASSAY OF PROTEIN SERUM: CPT | Performed by: NURSE PRACTITIONER

## 2022-01-01 PROCEDURE — 82330 ASSAY OF CALCIUM: CPT

## 2022-01-01 PROCEDURE — 82805 BLOOD GASES W/O2 SATURATION: CPT | Performed by: NURSE PRACTITIONER

## 2022-01-01 PROCEDURE — 82248 BILIRUBIN DIRECT: CPT

## 2022-01-01 PROCEDURE — 85730 THROMBOPLASTIN TIME PARTIAL: CPT | Performed by: NURSE PRACTITIONER

## 2022-01-01 PROCEDURE — 99291 CRITICAL CARE FIRST HOUR: CPT | Mod: 25 | Performed by: INTERNAL MEDICINE

## 2022-01-01 PROCEDURE — 250N000013 HC RX MED GY IP 250 OP 250 PS 637: Performed by: INTERNAL MEDICINE

## 2022-01-01 PROCEDURE — 83615 LACTATE (LD) (LDH) ENZYME: CPT | Performed by: NURSE PRACTITIONER

## 2022-01-01 PROCEDURE — 85652 RBC SED RATE AUTOMATED: CPT | Performed by: NURSE PRACTITIONER

## 2022-01-01 PROCEDURE — 250N000011 HC RX IP 250 OP 636: Performed by: INTERNAL MEDICINE

## 2022-01-01 PROCEDURE — 84484 ASSAY OF TROPONIN QUANT: CPT | Performed by: NURSE PRACTITIONER

## 2022-01-01 PROCEDURE — 999N000015 HC STATISTIC ARTERIAL MONITORING DAILY

## 2022-01-01 PROCEDURE — 258N000003 HC RX IP 258 OP 636: Performed by: INTERNAL MEDICINE

## 2022-01-01 PROCEDURE — 85520 HEPARIN ASSAY: CPT | Performed by: NURSE PRACTITIONER

## 2022-01-01 PROCEDURE — 84100 ASSAY OF PHOSPHORUS: CPT | Performed by: NURSE PRACTITIONER

## 2022-01-01 PROCEDURE — C9113 INJ PANTOPRAZOLE SODIUM, VIA: HCPCS | Performed by: NURSE PRACTITIONER

## 2022-01-01 PROCEDURE — 94003 VENT MGMT INPAT SUBQ DAY: CPT

## 2022-01-01 PROCEDURE — 82040 ASSAY OF SERUM ALBUMIN: CPT

## 2022-01-01 PROCEDURE — 95714 VEEG EA 12-26 HR UNMNTR: CPT

## 2022-01-01 PROCEDURE — 99233 SBSQ HOSP IP/OBS HIGH 50: CPT | Mod: GC | Performed by: INTERNAL MEDICINE

## 2022-01-01 PROCEDURE — 93308 TTE F-UP OR LMTD: CPT

## 2022-01-01 PROCEDURE — 85027 COMPLETE CBC AUTOMATED: CPT | Performed by: NURSE PRACTITIONER

## 2022-01-01 PROCEDURE — 200N000002 HC R&B ICU UMMC

## 2022-01-01 PROCEDURE — 272N000237 HC CARDIOHELP CIRCUIT

## 2022-01-01 PROCEDURE — 85379 FIBRIN DEGRADATION QUANT: CPT | Performed by: NURSE PRACTITIONER

## 2022-01-01 PROCEDURE — 94002 VENT MGMT INPAT INIT DAY: CPT

## 2022-01-01 PROCEDURE — 33967 INSERT I-AORT PERCUT DEVICE: CPT | Performed by: INTERNAL MEDICINE

## 2022-01-01 PROCEDURE — 87040 BLOOD CULTURE FOR BACTERIA: CPT | Performed by: INTERNAL MEDICINE

## 2022-01-01 PROCEDURE — 33949 ECMO/ECLS DAILY MGMT ARTERY: CPT

## 2022-01-01 PROCEDURE — 86316 IMMUNOASSAY TUMOR OTHER: CPT | Performed by: NURSE PRACTITIONER

## 2022-01-01 PROCEDURE — 82805 BLOOD GASES W/O2 SATURATION: CPT | Performed by: INTERNAL MEDICINE

## 2022-01-01 PROCEDURE — 999N000185 HC STATISTIC TRANSPORT TIME EA 15 MIN

## 2022-01-01 PROCEDURE — 71250 CT THORAX DX C-: CPT | Mod: 26 | Performed by: RADIOLOGY

## 2022-01-01 PROCEDURE — 99232 SBSQ HOSP IP/OBS MODERATE 35: CPT | Mod: 25 | Performed by: PSYCHIATRY & NEUROLOGY

## 2022-01-01 PROCEDURE — 87040 BLOOD CULTURE FOR BACTERIA: CPT | Performed by: STUDENT IN AN ORGANIZED HEALTH CARE EDUCATION/TRAINING PROGRAM

## 2022-01-01 PROCEDURE — 93308 TTE F-UP OR LMTD: CPT | Mod: 26 | Performed by: STUDENT IN AN ORGANIZED HEALTH CARE EDUCATION/TRAINING PROGRAM

## 2022-01-01 PROCEDURE — 250N000013 HC RX MED GY IP 250 OP 250 PS 637: Performed by: STUDENT IN AN ORGANIZED HEALTH CARE EDUCATION/TRAINING PROGRAM

## 2022-01-01 PROCEDURE — 71250 CT THORAX DX C-: CPT

## 2022-01-01 PROCEDURE — 83735 ASSAY OF MAGNESIUM: CPT | Performed by: NURSE PRACTITIONER

## 2022-01-01 PROCEDURE — 258N000001 HC RX 258: Performed by: STUDENT IN AN ORGANIZED HEALTH CARE EDUCATION/TRAINING PROGRAM

## 2022-01-01 PROCEDURE — 86140 C-REACTIVE PROTEIN: CPT | Performed by: NURSE PRACTITIONER

## 2022-01-01 PROCEDURE — 33949 ECMO/ECLS DAILY MGMT ARTERY: CPT | Performed by: INTERNAL MEDICINE

## 2022-01-01 PROCEDURE — 83051 HEMOGLOBIN PLASMA: CPT | Performed by: NURSE PRACTITIONER

## 2022-01-01 PROCEDURE — 33952 ECMO/ECLS INSJ PRPH CANNULA: CPT | Performed by: INTERNAL MEDICINE

## 2022-01-01 PROCEDURE — 87077 CULTURE AEROBIC IDENTIFY: CPT | Performed by: NURSE PRACTITIONER

## 2022-01-01 PROCEDURE — 85396 CLOTTING ASSAY WHOLE BLOOD: CPT | Performed by: NURSE PRACTITIONER

## 2022-01-01 PROCEDURE — 80053 COMPREHEN METABOLIC PANEL: CPT | Performed by: INTERNAL MEDICINE

## 2022-01-01 PROCEDURE — 71045 X-RAY EXAM CHEST 1 VIEW: CPT | Mod: 26 | Performed by: RADIOLOGY

## 2022-01-01 PROCEDURE — 85347 COAGULATION TIME ACTIVATED: CPT

## 2022-01-01 PROCEDURE — 99233 SBSQ HOSP IP/OBS HIGH 50: CPT | Performed by: INTERNAL MEDICINE

## 2022-01-01 PROCEDURE — 85610 PROTHROMBIN TIME: CPT | Performed by: NURSE PRACTITIONER

## 2022-01-01 PROCEDURE — 272N000452 HC KIT SHRLOCK 5FR POWER PICC TRIPLE LUMEN

## 2022-01-01 PROCEDURE — 272N000001 HC OR GENERAL SUPPLY STERILE: Performed by: INTERNAL MEDICINE

## 2022-01-01 PROCEDURE — 85300 ANTITHROMBIN III ACTIVITY: CPT | Performed by: NURSE PRACTITIONER

## 2022-01-01 PROCEDURE — 258N000003 HC RX IP 258 OP 636: Performed by: STUDENT IN AN ORGANIZED HEALTH CARE EDUCATION/TRAINING PROGRAM

## 2022-01-01 PROCEDURE — 83690 ASSAY OF LIPASE: CPT | Performed by: INTERNAL MEDICINE

## 2022-01-01 PROCEDURE — 84295 ASSAY OF SERUM SODIUM: CPT

## 2022-01-01 PROCEDURE — 250N000009 HC RX 250: Performed by: STUDENT IN AN ORGANIZED HEALTH CARE EDUCATION/TRAINING PROGRAM

## 2022-01-01 PROCEDURE — 82803 BLOOD GASES ANY COMBINATION: CPT | Performed by: NURSE PRACTITIONER

## 2022-01-01 PROCEDURE — 83605 ASSAY OF LACTIC ACID: CPT | Performed by: NURSE PRACTITIONER

## 2022-01-01 PROCEDURE — 85384 FIBRINOGEN ACTIVITY: CPT | Performed by: NURSE PRACTITIONER

## 2022-01-01 PROCEDURE — G0463 HOSPITAL OUTPT CLINIC VISIT: HCPCS

## 2022-01-01 PROCEDURE — 99292 CRITICAL CARE ADDL 30 MIN: CPT | Mod: 25 | Performed by: INTERNAL MEDICINE

## 2022-01-01 PROCEDURE — 95720 EEG PHY/QHP EA INCR W/VEEG: CPT | Performed by: PSYCHIATRY & NEUROLOGY

## 2022-01-01 PROCEDURE — 82330 ASSAY OF CALCIUM: CPT | Performed by: INTERNAL MEDICINE

## 2022-01-01 PROCEDURE — B2111ZZ FLUOROSCOPY OF MULTIPLE CORONARY ARTERIES USING LOW OSMOLAR CONTRAST: ICD-10-PCS | Performed by: INTERNAL MEDICINE

## 2022-01-01 PROCEDURE — 83735 ASSAY OF MAGNESIUM: CPT

## 2022-01-01 PROCEDURE — 999N000075 HC STATISTIC IABP MONITORING

## 2022-01-01 PROCEDURE — 85610 PROTHROMBIN TIME: CPT | Performed by: INTERNAL MEDICINE

## 2022-01-01 PROCEDURE — 85018 HEMOGLOBIN: CPT | Performed by: NURSE PRACTITIONER

## 2022-01-01 PROCEDURE — 99222 1ST HOSP IP/OBS MODERATE 55: CPT | Performed by: CLINICAL NURSE SPECIALIST

## 2022-01-01 PROCEDURE — 70450 CT HEAD/BRAIN W/O DYE: CPT | Mod: 26 | Performed by: RADIOLOGY

## 2022-01-01 PROCEDURE — 93005 ELECTROCARDIOGRAM TRACING: CPT

## 2022-01-01 PROCEDURE — 99207 PR NO BILLABLE SERVICE THIS VISIT: CPT | Performed by: INTERNAL MEDICINE

## 2022-01-01 PROCEDURE — 83605 ASSAY OF LACTIC ACID: CPT | Performed by: STUDENT IN AN ORGANIZED HEALTH CARE EDUCATION/TRAINING PROGRAM

## 2022-01-01 PROCEDURE — 71045 X-RAY EXAM CHEST 1 VIEW: CPT

## 2022-01-01 PROCEDURE — 82947 ASSAY GLUCOSE BLOOD QUANT: CPT | Performed by: NURSE PRACTITIONER

## 2022-01-01 PROCEDURE — 80143 DRUG ASSAY ACETAMINOPHEN: CPT | Performed by: INTERNAL MEDICINE

## 2022-01-01 PROCEDURE — 82805 BLOOD GASES W/O2 SATURATION: CPT | Performed by: STUDENT IN AN ORGANIZED HEALTH CARE EDUCATION/TRAINING PROGRAM

## 2022-01-01 PROCEDURE — 90947 DIALYSIS REPEATED EVAL: CPT

## 2022-01-01 PROCEDURE — 250N000009 HC RX 250

## 2022-01-01 PROCEDURE — 83051 HEMOGLOBIN PLASMA: CPT | Performed by: INTERNAL MEDICINE

## 2022-01-01 PROCEDURE — 84520 ASSAY OF UREA NITROGEN: CPT | Performed by: NURSE PRACTITIONER

## 2022-01-01 PROCEDURE — 250N000011 HC RX IP 250 OP 636: Performed by: PHYSICIAN ASSISTANT

## 2022-01-01 PROCEDURE — 5A1D90Z PERFORMANCE OF URINARY FILTRATION, CONTINUOUS, GREATER THAN 18 HOURS PER DAY: ICD-10-PCS | Performed by: INTERNAL MEDICINE

## 2022-01-01 PROCEDURE — 360N000079 HC SURGERY LEVEL 6, PER MIN: Performed by: TRANSPLANT SURGERY

## 2022-01-01 PROCEDURE — 82803 BLOOD GASES ANY COMBINATION: CPT | Performed by: STUDENT IN AN ORGANIZED HEALTH CARE EDUCATION/TRAINING PROGRAM

## 2022-01-01 PROCEDURE — 86901 BLOOD TYPING SEROLOGIC RH(D): CPT | Performed by: NURSE PRACTITIONER

## 2022-01-01 PROCEDURE — 80347 BENZODIAZEPINES 13 OR MORE: CPT | Performed by: NURSE PRACTITIONER

## 2022-01-01 PROCEDURE — 87106 FUNGI IDENTIFICATION YEAST: CPT | Performed by: NURSE PRACTITIONER

## 2022-01-01 PROCEDURE — 84145 PROCALCITONIN (PCT): CPT | Performed by: NURSE PRACTITIONER

## 2022-01-01 PROCEDURE — 87086 URINE CULTURE/COLONY COUNT: CPT | Performed by: STUDENT IN AN ORGANIZED HEALTH CARE EDUCATION/TRAINING PROGRAM

## 2022-01-01 PROCEDURE — 83036 HEMOGLOBIN GLYCOSYLATED A1C: CPT | Performed by: STUDENT IN AN ORGANIZED HEALTH CARE EDUCATION/TRAINING PROGRAM

## 2022-01-01 PROCEDURE — 250N000011 HC RX IP 250 OP 636: Performed by: NURSE PRACTITIONER

## 2022-01-01 PROCEDURE — 86901 BLOOD TYPING SEROLOGIC RH(D): CPT | Performed by: INTERNAL MEDICINE

## 2022-01-01 PROCEDURE — 258N000003 HC RX IP 258 OP 636: Performed by: NURSE PRACTITIONER

## 2022-01-01 PROCEDURE — 70450 CT HEAD/BRAIN W/O DYE: CPT

## 2022-01-01 PROCEDURE — 250N000009 HC RX 250: Performed by: NURSE PRACTITIONER

## 2022-01-01 PROCEDURE — 258N000001 HC RX 258: Performed by: INTERNAL MEDICINE

## 2022-01-01 PROCEDURE — 76775 US EXAM ABDO BACK WALL LIM: CPT

## 2022-01-01 PROCEDURE — 93880 EXTRACRANIAL BILAT STUDY: CPT

## 2022-01-01 PROCEDURE — 999N000077 HC STATISTIC INSERT IABP

## 2022-01-01 PROCEDURE — 80307 DRUG TEST PRSMV CHEM ANLYZR: CPT | Performed by: NURSE PRACTITIONER

## 2022-01-01 PROCEDURE — 83036 HEMOGLOBIN GLYCOSYLATED A1C: CPT | Performed by: INTERNAL MEDICINE

## 2022-01-01 PROCEDURE — 95700 EEG CONT REC W/VID EEG TECH: CPT | Performed by: PSYCHIATRY & NEUROLOGY

## 2022-01-01 PROCEDURE — 93325 DOPPLER ECHO COLOR FLOW MAPG: CPT | Mod: 26 | Performed by: INTERNAL MEDICINE

## 2022-01-01 PROCEDURE — 82533 TOTAL CORTISOL: CPT | Performed by: NURSE PRACTITIONER

## 2022-01-01 PROCEDURE — 85027 COMPLETE CBC AUTOMATED: CPT | Performed by: INTERNAL MEDICINE

## 2022-01-01 PROCEDURE — P9016 RBC LEUKOCYTES REDUCED: HCPCS | Performed by: NURSE PRACTITIONER

## 2022-01-01 PROCEDURE — 99207 PR SC NO CHARGE VISIT: CPT

## 2022-01-01 PROCEDURE — 80326 AMPHETAMINES 5 OR MORE: CPT | Performed by: NURSE PRACTITIONER

## 2022-01-01 PROCEDURE — 85027 COMPLETE CBC AUTOMATED: CPT | Performed by: STUDENT IN AN ORGANIZED HEALTH CARE EDUCATION/TRAINING PROGRAM

## 2022-01-01 PROCEDURE — 272N000555 HC SENSOR NIRS OXIMETER, ADULT

## 2022-01-01 PROCEDURE — 93325 DOPPLER ECHO COLOR FLOW MAPG: CPT

## 2022-01-01 PROCEDURE — 250N000009 HC RX 250: Performed by: INTERNAL MEDICINE

## 2022-01-01 PROCEDURE — 85730 THROMBOPLASTIN TIME PARTIAL: CPT | Performed by: INTERNAL MEDICINE

## 2022-01-01 PROCEDURE — 87641 MR-STAPH DNA AMP PROBE: CPT | Performed by: INTERNAL MEDICINE

## 2022-01-01 PROCEDURE — 85384 FIBRINOGEN ACTIVITY: CPT | Performed by: INTERNAL MEDICINE

## 2022-01-01 PROCEDURE — 95711 VEEG 2-12 HR UNMONITORED: CPT

## 2022-01-01 PROCEDURE — 272N000001 HC OR GENERAL SUPPLY STERILE: Performed by: TRANSPLANT SURGERY

## 2022-01-01 PROCEDURE — 86923 COMPATIBILITY TEST ELECTRIC: CPT | Performed by: NURSE PRACTITIONER

## 2022-01-01 PROCEDURE — 85025 COMPLETE CBC W/AUTO DIFF WBC: CPT | Performed by: NURSE PRACTITIONER

## 2022-01-01 PROCEDURE — 999N000157 HC STATISTIC RCP TIME EA 10 MIN

## 2022-01-01 PROCEDURE — 250N000009 HC RX 250: Performed by: PHYSICIAN ASSISTANT

## 2022-01-01 PROCEDURE — 99223 1ST HOSP IP/OBS HIGH 75: CPT

## 2022-01-01 PROCEDURE — 999N000065 XR CHEST PORT 1 VIEW

## 2022-01-01 PROCEDURE — 85014 HEMATOCRIT: CPT | Performed by: NURSE PRACTITIONER

## 2022-01-01 PROCEDURE — 36569 INSJ PICC 5 YR+ W/O IMAGING: CPT

## 2022-01-01 PROCEDURE — 93306 TTE W/DOPPLER COMPLETE: CPT | Mod: 26 | Performed by: STUDENT IN AN ORGANIZED HEALTH CARE EDUCATION/TRAINING PROGRAM

## 2022-01-01 PROCEDURE — 85027 COMPLETE CBC AUTOMATED: CPT

## 2022-01-01 PROCEDURE — 258N000003 HC RX IP 258 OP 636: Performed by: PHYSICIAN ASSISTANT

## 2022-01-01 PROCEDURE — 80048 BASIC METABOLIC PNL TOTAL CA: CPT

## 2022-01-01 PROCEDURE — 80349 CANNABINOIDS NATURAL: CPT | Performed by: NURSE PRACTITIONER

## 2022-01-01 PROCEDURE — 84295 ASSAY OF SERUM SODIUM: CPT | Performed by: INTERNAL MEDICINE

## 2022-01-01 PROCEDURE — U0005 INFEC AGEN DETEC AMPLI PROBE: HCPCS | Performed by: INTERNAL MEDICINE

## 2022-01-01 PROCEDURE — 82810 BLOOD GASES O2 SAT ONLY: CPT

## 2022-01-01 PROCEDURE — 999N000128 HC STATISTIC PERIPHERAL IV START W/O US GUIDANCE

## 2022-01-01 PROCEDURE — 80202 ASSAY OF VANCOMYCIN: CPT | Performed by: INTERNAL MEDICINE

## 2022-01-01 PROCEDURE — 2894A VOIDCORRECT: CPT | Mod: 25 | Performed by: INTERNAL MEDICINE

## 2022-01-01 PROCEDURE — 81001 URINALYSIS AUTO W/SCOPE: CPT | Performed by: STUDENT IN AN ORGANIZED HEALTH CARE EDUCATION/TRAINING PROGRAM

## 2022-01-01 PROCEDURE — 272N000002 HC OR SUPPLY OTHER OPNP: Performed by: INTERNAL MEDICINE

## 2022-01-01 PROCEDURE — 71045 X-RAY EXAM CHEST 1 VIEW: CPT | Mod: 77

## 2022-01-01 PROCEDURE — 82310 ASSAY OF CALCIUM: CPT | Performed by: INTERNAL MEDICINE

## 2022-01-01 PROCEDURE — 93925 LOWER EXTREMITY STUDY: CPT | Mod: 26 | Performed by: RADIOLOGY

## 2022-01-01 PROCEDURE — 80048 BASIC METABOLIC PNL TOTAL CA: CPT | Performed by: STUDENT IN AN ORGANIZED HEALTH CARE EDUCATION/TRAINING PROGRAM

## 2022-01-01 PROCEDURE — 74176 CT ABD & PELVIS W/O CONTRAST: CPT | Mod: 26 | Performed by: RADIOLOGY

## 2022-01-01 PROCEDURE — 83605 ASSAY OF LACTIC ACID: CPT | Performed by: INTERNAL MEDICINE

## 2022-01-01 PROCEDURE — 93308 TTE F-UP OR LMTD: CPT | Mod: 26 | Performed by: INTERNAL MEDICINE

## 2022-01-01 PROCEDURE — 410N000004: Performed by: INTERNAL MEDICINE

## 2022-01-01 PROCEDURE — 99238 HOSP IP/OBS DSCHRG MGMT 30/<: CPT | Mod: 25 | Performed by: INTERNAL MEDICINE

## 2022-01-01 PROCEDURE — 76775 US EXAM ABDO BACK WALL LIM: CPT | Mod: 26 | Performed by: RADIOLOGY

## 2022-01-01 PROCEDURE — A7035 POS AIRWAY PRESS HEADGEAR: HCPCS

## 2022-01-01 PROCEDURE — 76700 US EXAM ABDOM COMPLETE: CPT

## 2022-01-01 PROCEDURE — 87205 SMEAR GRAM STAIN: CPT | Performed by: NURSE PRACTITIONER

## 2022-01-01 PROCEDURE — 76700 US EXAM ABDOM COMPLETE: CPT | Mod: 26 | Performed by: RADIOLOGY

## 2022-01-01 PROCEDURE — 36415 COLL VENOUS BLD VENIPUNCTURE: CPT | Performed by: INTERNAL MEDICINE

## 2022-01-01 PROCEDURE — 87106 FUNGI IDENTIFICATION YEAST: CPT | Performed by: STUDENT IN AN ORGANIZED HEALTH CARE EDUCATION/TRAINING PROGRAM

## 2022-01-01 PROCEDURE — 99152 MOD SED SAME PHYS/QHP 5/>YRS: CPT | Performed by: INTERNAL MEDICINE

## 2022-01-01 PROCEDURE — 82150 ASSAY OF AMYLASE: CPT | Performed by: INTERNAL MEDICINE

## 2022-01-01 PROCEDURE — 36556 INSERT NON-TUNNEL CV CATH: CPT | Performed by: INTERNAL MEDICINE

## 2022-01-01 PROCEDURE — U0005 INFEC AGEN DETEC AMPLI PROBE: HCPCS | Performed by: STUDENT IN AN ORGANIZED HEALTH CARE EDUCATION/TRAINING PROGRAM

## 2022-01-01 PROCEDURE — 99233 SBSQ HOSP IP/OBS HIGH 50: CPT | Mod: 25 | Performed by: PSYCHIATRY & NEUROLOGY

## 2022-01-01 PROCEDURE — 82803 BLOOD GASES ANY COMBINATION: CPT | Performed by: INTERNAL MEDICINE

## 2022-01-01 PROCEDURE — 87077 CULTURE AEROBIC IDENTIFY: CPT | Performed by: STUDENT IN AN ORGANIZED HEALTH CARE EDUCATION/TRAINING PROGRAM

## 2022-01-01 PROCEDURE — 93306 TTE W/DOPPLER COMPLETE: CPT

## 2022-01-01 PROCEDURE — 83010 ASSAY OF HAPTOGLOBIN QUANT: CPT | Performed by: INTERNAL MEDICINE

## 2022-01-01 PROCEDURE — 5A1522G EXTRACORPOREAL OXYGENATION, MEMBRANE, PERIPHERAL VENO-ARTERIAL: ICD-10-PCS | Performed by: INTERNAL MEDICINE

## 2022-01-01 PROCEDURE — 86022 PLATELET ANTIBODIES: CPT

## 2022-01-01 PROCEDURE — 84295 ASSAY OF SERUM SODIUM: CPT | Performed by: NURSE PRACTITIONER

## 2022-01-01 PROCEDURE — 80053 COMPREHEN METABOLIC PANEL: CPT

## 2022-01-01 PROCEDURE — C1751 CATH, INF, PER/CENT/MIDLINE: HCPCS | Performed by: INTERNAL MEDICINE

## 2022-01-01 PROCEDURE — 84295 ASSAY OF SERUM SODIUM: CPT | Performed by: STUDENT IN AN ORGANIZED HEALTH CARE EDUCATION/TRAINING PROGRAM

## 2022-01-01 PROCEDURE — 99233 SBSQ HOSP IP/OBS HIGH 50: CPT | Performed by: CLINICAL NURSE SPECIALIST

## 2022-01-01 PROCEDURE — 86850 RBC ANTIBODY SCREEN: CPT | Performed by: INTERNAL MEDICINE

## 2022-01-01 PROCEDURE — 81001 URINALYSIS AUTO W/SCOPE: CPT | Performed by: NURSE PRACTITIONER

## 2022-01-01 PROCEDURE — 80354 DRUG SCREENING FENTANYL: CPT | Performed by: NURSE PRACTITIONER

## 2022-01-01 PROCEDURE — 33947 ECMO/ECLS INITIATION ARTERY: CPT

## 2022-01-01 PROCEDURE — 5A02210 ASSISTANCE WITH CARDIAC OUTPUT USING BALLOON PUMP, CONTINUOUS: ICD-10-PCS | Performed by: INTERNAL MEDICINE

## 2022-01-01 PROCEDURE — C1769 GUIDE WIRE: HCPCS | Performed by: INTERNAL MEDICINE

## 2022-01-01 PROCEDURE — 93880 EXTRACRANIAL BILAT STUDY: CPT | Mod: 26 | Performed by: RADIOLOGY

## 2022-01-01 PROCEDURE — 999N000065 XR ABDOMEN PORT 1 VIEWS

## 2022-01-01 PROCEDURE — 95718 EEG PHYS/QHP 2-12 HR W/VEEG: CPT | Performed by: PSYCHIATRY & NEUROLOGY

## 2022-01-01 PROCEDURE — 82248 BILIRUBIN DIRECT: CPT | Performed by: STUDENT IN AN ORGANIZED HEALTH CARE EDUCATION/TRAINING PROGRAM

## 2022-01-01 PROCEDURE — 3E043XZ INTRODUCTION OF VASOPRESSOR INTO CENTRAL VEIN, PERCUTANEOUS APPROACH: ICD-10-PCS | Performed by: INTERNAL MEDICINE

## 2022-01-01 PROCEDURE — 93454 CORONARY ARTERY ANGIO S&I: CPT | Performed by: INTERNAL MEDICINE

## 2022-01-01 PROCEDURE — 74018 RADEX ABDOMEN 1 VIEW: CPT | Mod: 26 | Performed by: RADIOLOGY

## 2022-01-01 PROCEDURE — C1894 INTRO/SHEATH, NON-LASER: HCPCS | Performed by: INTERNAL MEDICINE

## 2022-01-01 PROCEDURE — 93925 LOWER EXTREMITY STUDY: CPT

## 2022-01-01 PROCEDURE — 82947 ASSAY GLUCOSE BLOOD QUANT: CPT | Performed by: INTERNAL MEDICINE

## 2022-01-01 PROCEDURE — 5A1955Z RESPIRATORY VENTILATION, GREATER THAN 96 CONSECUTIVE HOURS: ICD-10-PCS | Performed by: INTERNAL MEDICINE

## 2022-01-01 PROCEDURE — 99291 CRITICAL CARE FIRST HOUR: CPT | Mod: 25 | Performed by: PSYCHIATRY & NEUROLOGY

## 2022-01-01 PROCEDURE — 272N000057 HC CATH BALLOON IABP

## 2022-01-01 PROCEDURE — 272N000473 HC KIT, VPS RHYTHM STYLET

## 2022-01-01 PROCEDURE — 410N000003 HC PER-PERFUSION 1ST 30 MIN: Performed by: INTERNAL MEDICINE

## 2022-01-01 PROCEDURE — 84450 TRANSFERASE (AST) (SGOT): CPT | Performed by: NURSE PRACTITIONER

## 2022-01-01 RX ORDER — IOPAMIDOL 755 MG/ML
INJECTION, SOLUTION INTRAVASCULAR
Status: DISCONTINUED | OUTPATIENT
Start: 2022-01-01 | End: 2022-01-01 | Stop reason: HOSPADM

## 2022-01-01 RX ORDER — CALCIUM GLUCONATE 20 MG/ML
2 INJECTION, SOLUTION INTRAVENOUS EVERY 8 HOURS PRN
Status: DISCONTINUED | OUTPATIENT
Start: 2022-01-01 | End: 2022-04-21 | Stop reason: HOSPADM

## 2022-01-01 RX ORDER — AMIODARONE HYDROCHLORIDE 200 MG/1
200 TABLET ORAL DAILY
Status: DISCONTINUED | OUTPATIENT
Start: 2022-01-01 | End: 2022-01-01

## 2022-01-01 RX ORDER — FENTANYL CITRATE-0.9 % NACL/PF 10 MCG/ML
PLASTIC BAG, INJECTION (ML) INTRAVENOUS
Status: DISCONTINUED | OUTPATIENT
Start: 2022-01-01 | End: 2022-01-01 | Stop reason: HOSPADM

## 2022-01-01 RX ORDER — CHLORHEXIDINE GLUCONATE ORAL RINSE 1.2 MG/ML
15 SOLUTION DENTAL 2 TIMES DAILY
Status: DISCONTINUED | OUTPATIENT
Start: 2022-01-01 | End: 2022-04-21 | Stop reason: HOSPADM

## 2022-01-01 RX ORDER — NOREPINEPHRINE BITARTRATE 0.06 MG/ML
.01-.6 INJECTION, SOLUTION INTRAVENOUS CONTINUOUS PRN
Status: CANCELLED | OUTPATIENT
Start: 2022-01-01

## 2022-01-01 RX ORDER — MIDAZOLAM HCL IN 0.9 % NACL/PF 1 MG/ML
1-8 PLASTIC BAG, INJECTION (ML) INTRAVENOUS CONTINUOUS
Status: DISCONTINUED | OUTPATIENT
Start: 2022-01-01 | End: 2022-01-01

## 2022-01-01 RX ORDER — HEPARIN SODIUM,PORCINE 10 UNIT/ML
5-20 VIAL (ML) INTRAVENOUS
Status: DISCONTINUED | OUTPATIENT
Start: 2022-01-01 | End: 2022-04-21 | Stop reason: HOSPADM

## 2022-01-01 RX ORDER — HEPARIN SODIUM 10000 [USP'U]/100ML
10-50 INJECTION, SOLUTION INTRAVENOUS CONTINUOUS
Status: DISCONTINUED | OUTPATIENT
Start: 2022-01-01 | End: 2022-01-01

## 2022-01-01 RX ORDER — MIDAZOLAM HCL IN 0.9 % NACL/PF 1 MG/ML
1-8 PLASTIC BAG, INJECTION (ML) INTRAVENOUS CONTINUOUS
Status: CANCELLED | OUTPATIENT
Start: 2022-01-01

## 2022-01-01 RX ORDER — AMIODARONE HYDROCHLORIDE 200 MG/1
400 TABLET ORAL 2 TIMES DAILY
Status: COMPLETED | OUTPATIENT
Start: 2022-01-01 | End: 2022-01-01

## 2022-01-01 RX ORDER — HEPARIN SODIUM 200 [USP'U]/100ML
3 INJECTION, SOLUTION INTRAVENOUS CONTINUOUS
Status: DISCONTINUED | OUTPATIENT
Start: 2022-01-01 | End: 2022-04-21 | Stop reason: HOSPADM

## 2022-01-01 RX ORDER — B COMPLEX C NO.10/FOLIC ACID 900MCG/5ML
5 LIQUID (ML) ORAL DAILY
Status: DISCONTINUED | OUTPATIENT
Start: 2022-01-01 | End: 2022-04-21 | Stop reason: HOSPADM

## 2022-01-01 RX ORDER — MAGNESIUM SULFATE HEPTAHYDRATE 40 MG/ML
4 INJECTION, SOLUTION INTRAVENOUS EVERY 4 HOURS PRN
Status: DISCONTINUED | OUTPATIENT
Start: 2022-01-01 | End: 2022-01-01

## 2022-01-01 RX ORDER — DEXTROSE MONOHYDRATE 25 G/50ML
25-50 INJECTION, SOLUTION INTRAVENOUS
Status: DISCONTINUED | OUTPATIENT
Start: 2022-01-01 | End: 2022-04-21 | Stop reason: HOSPADM

## 2022-01-01 RX ORDER — FENTANYL CITRATE 50 UG/ML
INJECTION, SOLUTION INTRAMUSCULAR; INTRAVENOUS
Status: DISCONTINUED | OUTPATIENT
Start: 2022-01-01 | End: 2022-01-01 | Stop reason: HOSPADM

## 2022-01-01 RX ORDER — CALCIUM CHLORIDE, MAGNESIUM CHLORIDE, SODIUM CHLORIDE, SODIUM BICARBONATE, POTASSIUM CHLORIDE AND SODIUM PHOSPHATE DIBASIC DIHYDRATE 3.68; 3.05; 6.34; 3.09; .314; .187 G/L; G/L; G/L; G/L; G/L; G/L
INJECTION INTRAVENOUS CONTINUOUS
Status: DISCONTINUED | OUTPATIENT
Start: 2022-01-01 | End: 2022-04-18

## 2022-01-01 RX ORDER — HEPARIN SODIUM 1000 [USP'U]/ML
26000 INJECTION, SOLUTION INTRAVENOUS; SUBCUTANEOUS ONCE
Status: COMPLETED | OUTPATIENT
Start: 2022-01-01 | End: 2022-01-01

## 2022-01-01 RX ORDER — DEXTROSE MONOHYDRATE 100 MG/ML
INJECTION, SOLUTION INTRAVENOUS CONTINUOUS PRN
Status: DISCONTINUED | OUTPATIENT
Start: 2022-01-01 | End: 2022-01-01

## 2022-01-01 RX ORDER — NICOTINE POLACRILEX 4 MG
15-30 LOZENGE BUCCAL
Status: DISCONTINUED | OUTPATIENT
Start: 2022-01-01 | End: 2022-04-21 | Stop reason: HOSPADM

## 2022-01-01 RX ORDER — CALCIUM CHLORIDE, MAGNESIUM CHLORIDE, DEXTROSE MONOHYDRATE, LACTIC ACID, SODIUM CHLORIDE, SODIUM BICARBONATE AND POTASSIUM CHLORIDE 5.15; 2.03; 22; 5.4; 6.46; 3.09; .157 G/L; G/L; G/L; G/L; G/L; G/L; G/L
INJECTION INTRAVENOUS CONTINUOUS
Status: DISCONTINUED | OUTPATIENT
Start: 2022-01-01 | End: 2022-01-01

## 2022-01-01 RX ORDER — FOLIC ACID 1 MG/1
1 TABLET ORAL DAILY
Status: DISCONTINUED | OUTPATIENT
Start: 2022-01-01 | End: 2022-04-21 | Stop reason: HOSPADM

## 2022-01-01 RX ORDER — HEPARIN SODIUM 1000 [USP'U]/ML
20000 INJECTION, SOLUTION INTRAVENOUS; SUBCUTANEOUS ONCE
Status: DISCONTINUED | OUTPATIENT
Start: 2022-01-01 | End: 2022-04-21 | Stop reason: HOSPADM

## 2022-01-01 RX ORDER — LIDOCAINE 40 MG/G
CREAM TOPICAL
Status: ACTIVE | OUTPATIENT
Start: 2022-01-01 | End: 2022-01-01

## 2022-01-01 RX ORDER — FENTANYL CITRATE 50 UG/ML
50 INJECTION, SOLUTION INTRAMUSCULAR; INTRAVENOUS EVERY 30 MIN PRN
Status: DISCONTINUED | OUTPATIENT
Start: 2022-01-01 | End: 2022-04-21 | Stop reason: HOSPADM

## 2022-01-01 RX ORDER — SODIUM CHLORIDE 234 MG/ML
30 SOLUTION, CONCENTRATE INTRAVENOUS ONCE
Status: COMPLETED | OUTPATIENT
Start: 2022-01-01 | End: 2022-01-01

## 2022-01-01 RX ORDER — HEPARIN SODIUM 1000 [USP'U]/ML
INJECTION, SOLUTION INTRAVENOUS; SUBCUTANEOUS
Status: DISCONTINUED | OUTPATIENT
Start: 2022-01-01 | End: 2022-01-01 | Stop reason: HOSPADM

## 2022-01-01 RX ORDER — MAGNESIUM SULFATE HEPTAHYDRATE 40 MG/ML
2 INJECTION, SOLUTION INTRAVENOUS DAILY PRN
Status: DISCONTINUED | OUTPATIENT
Start: 2022-01-01 | End: 2022-01-01

## 2022-01-01 RX ORDER — CALCIUM CHLORIDE, MAGNESIUM CHLORIDE, SODIUM CHLORIDE, SODIUM BICARBONATE, POTASSIUM CHLORIDE AND SODIUM PHOSPHATE DIBASIC DIHYDRATE 3.68; 3.05; 6.34; 3.09; .314; .187 G/L; G/L; G/L; G/L; G/L; G/L
12.5 INJECTION INTRAVENOUS CONTINUOUS
Status: DISCONTINUED | OUTPATIENT
Start: 2022-01-01 | End: 2022-04-18

## 2022-01-01 RX ORDER — ARGATROBAN 1 MG/ML
350 INJECTION, SOLUTION INTRAVENOUS
Status: DISCONTINUED | OUTPATIENT
Start: 2022-01-01 | End: 2022-01-01 | Stop reason: HOSPADM

## 2022-01-01 RX ORDER — AMIODARONE HYDROCHLORIDE 200 MG/1
400 TABLET ORAL 2 TIMES DAILY
Status: DISCONTINUED | OUTPATIENT
Start: 2022-01-01 | End: 2022-01-01

## 2022-01-01 RX ORDER — NOREPINEPHRINE BITARTRATE 0.06 MG/ML
.01-.6 INJECTION, SOLUTION INTRAVENOUS CONTINUOUS
Status: DISCONTINUED | OUTPATIENT
Start: 2022-01-01 | End: 2022-01-01

## 2022-01-01 RX ORDER — PIPERACILLIN SODIUM, TAZOBACTAM SODIUM 2; .25 G/10ML; G/10ML
2.25 INJECTION, POWDER, LYOPHILIZED, FOR SOLUTION INTRAVENOUS EVERY 6 HOURS
Status: DISCONTINUED | OUTPATIENT
Start: 2022-01-01 | End: 2022-01-01

## 2022-01-01 RX ORDER — ALBUMIN, HUMAN INJ 5% 5 %
12.5 SOLUTION INTRAVENOUS EVERY 5 MIN PRN
Status: DISCONTINUED | OUTPATIENT
Start: 2022-01-01 | End: 2022-01-01 | Stop reason: HOSPADM

## 2022-01-01 RX ORDER — PHENYLEPHRINE HCL IN 0.9% NACL 50MG/250ML
.1-6 PLASTIC BAG, INJECTION (ML) INTRAVENOUS CONTINUOUS
Status: DISCONTINUED | OUTPATIENT
Start: 2022-01-01 | End: 2022-01-01

## 2022-01-01 RX ORDER — HEPARIN SODIUM 10000 [USP'U]/100ML
100-1000 INJECTION, SOLUTION INTRAVENOUS CONTINUOUS PRN
Status: DISCONTINUED | OUTPATIENT
Start: 2022-01-01 | End: 2022-01-01 | Stop reason: HOSPADM

## 2022-01-01 RX ORDER — GLYCOPYRROLATE 0.2 MG/ML
0.2 INJECTION, SOLUTION INTRAMUSCULAR; INTRAVENOUS ONCE
Status: COMPLETED | OUTPATIENT
Start: 2022-01-01 | End: 2022-01-01

## 2022-01-01 RX ORDER — FUROSEMIDE 10 MG/ML
40 INJECTION INTRAMUSCULAR; INTRAVENOUS ONCE
Status: COMPLETED | OUTPATIENT
Start: 2022-01-01 | End: 2022-01-01

## 2022-01-01 RX ORDER — VANCOMYCIN HYDROCHLORIDE 1 G/20ML
1750 INJECTION, POWDER, LYOPHILIZED, FOR SOLUTION INTRAVENOUS ONCE
Status: DISCONTINUED | OUTPATIENT
Start: 2022-01-01 | End: 2022-01-01

## 2022-01-01 RX ORDER — SODIUM CHLORIDE, SODIUM LACTATE, POTASSIUM CHLORIDE, CALCIUM CHLORIDE 600; 310; 30; 20 MG/100ML; MG/100ML; MG/100ML; MG/100ML
250 INJECTION, SOLUTION INTRAVENOUS
Status: DISCONTINUED | OUTPATIENT
Start: 2022-01-01 | End: 2022-04-21 | Stop reason: HOSPADM

## 2022-01-01 RX ORDER — LIDOCAINE 40 MG/G
CREAM TOPICAL
Status: DISCONTINUED | OUTPATIENT
Start: 2022-01-01 | End: 2022-04-21 | Stop reason: HOSPADM

## 2022-01-01 RX ORDER — DEXTROSE MONOHYDRATE 100 MG/ML
INJECTION, SOLUTION INTRAVENOUS CONTINUOUS
Status: DISCONTINUED | OUTPATIENT
Start: 2022-01-01 | End: 2022-04-21 | Stop reason: HOSPADM

## 2022-01-01 RX ORDER — POTASSIUM CHLORIDE 14.9 MG/ML
20 INJECTION INTRAVENOUS
Status: DISCONTINUED | OUTPATIENT
Start: 2022-01-01 | End: 2022-01-01

## 2022-01-01 RX ORDER — DEXTROSE MONOHYDRATE 100 MG/ML
INJECTION, SOLUTION INTRAVENOUS CONTINUOUS
Status: DISCONTINUED | OUTPATIENT
Start: 2022-01-01 | End: 2022-01-01

## 2022-01-01 RX ORDER — DEXTROSE MONOHYDRATE 25 G/50ML
INJECTION, SOLUTION INTRAVENOUS
Status: DISCONTINUED | OUTPATIENT
Start: 2022-01-01 | End: 2022-01-01 | Stop reason: HOSPADM

## 2022-01-01 RX ORDER — MAGNESIUM SULFATE HEPTAHYDRATE 40 MG/ML
2 INJECTION, SOLUTION INTRAVENOUS EVERY 8 HOURS PRN
Status: DISCONTINUED | OUTPATIENT
Start: 2022-01-01 | End: 2022-04-21 | Stop reason: HOSPADM

## 2022-01-01 RX ORDER — PIPERACILLIN SODIUM, TAZOBACTAM SODIUM 3; .375 G/15ML; G/15ML
3.38 INJECTION, POWDER, LYOPHILIZED, FOR SOLUTION INTRAVENOUS EVERY 6 HOURS
Status: COMPLETED | OUTPATIENT
Start: 2022-01-01 | End: 2022-01-01

## 2022-01-01 RX ORDER — VECURONIUM BROMIDE 1 MG/ML
10 INJECTION, POWDER, LYOPHILIZED, FOR SOLUTION INTRAVENOUS ONCE
Status: DISCONTINUED | OUTPATIENT
Start: 2022-01-01 | End: 2022-01-01

## 2022-01-01 RX ORDER — ARGATROBAN 1 MG/ML
150 INJECTION, SOLUTION INTRAVENOUS
Status: DISCONTINUED | OUTPATIENT
Start: 2022-01-01 | End: 2022-01-01 | Stop reason: HOSPADM

## 2022-01-01 RX ORDER — SODIUM CHLORIDE 9 MG/ML
INJECTION, SOLUTION INTRAVENOUS CONTINUOUS
Status: CANCELLED | OUTPATIENT
Start: 2022-01-01

## 2022-01-01 RX ORDER — POTASSIUM CHLORIDE 29.8 MG/ML
20 INJECTION INTRAVENOUS EVERY 8 HOURS PRN
Status: DISCONTINUED | OUTPATIENT
Start: 2022-01-01 | End: 2022-04-21 | Stop reason: HOSPADM

## 2022-01-01 RX ORDER — DEXTROSE MONOHYDRATE 50 MG/ML
INJECTION, SOLUTION INTRAVENOUS CONTINUOUS
Status: DISCONTINUED | OUTPATIENT
Start: 2022-01-01 | End: 2022-01-01

## 2022-01-01 RX ORDER — PIPERACILLIN SODIUM, TAZOBACTAM SODIUM 3; .375 G/15ML; G/15ML
3.38 INJECTION, POWDER, LYOPHILIZED, FOR SOLUTION INTRAVENOUS ONCE
Status: COMPLETED | OUTPATIENT
Start: 2022-01-01 | End: 2022-01-01

## 2022-01-01 RX ORDER — HEPARIN SODIUM 200 [USP'U]/100ML
3 INJECTION, SOLUTION INTRAVENOUS CONTINUOUS
Status: DISCONTINUED | OUTPATIENT
Start: 2022-01-01 | End: 2022-01-01

## 2022-01-01 RX ORDER — 3% SODIUM CHLORIDE 3 G/100ML
INJECTION, SOLUTION INTRAVENOUS CONTINUOUS
Status: DISCONTINUED | OUTPATIENT
Start: 2022-01-01 | End: 2022-01-01

## 2022-01-01 RX ORDER — LIDOCAINE HYDROCHLORIDE ANHYDROUS AND DEXTROSE MONOHYDRATE .8; 5 G/100ML; G/100ML
1-4 INJECTION, SOLUTION INTRAVENOUS CONTINUOUS
Status: DISCONTINUED | OUTPATIENT
Start: 2022-01-01 | End: 2022-01-01 | Stop reason: HOSPADM

## 2022-01-01 RX ORDER — HEPARIN SODIUM,PORCINE 10 UNIT/ML
5-20 VIAL (ML) INTRAVENOUS EVERY 24 HOURS
Status: DISCONTINUED | OUTPATIENT
Start: 2022-01-01 | End: 2022-01-01

## 2022-01-01 RX ORDER — PIPERACILLIN SODIUM, TAZOBACTAM SODIUM 4; .5 G/20ML; G/20ML
4.5 INJECTION, POWDER, LYOPHILIZED, FOR SOLUTION INTRAVENOUS EVERY 6 HOURS
Status: DISCONTINUED | OUTPATIENT
Start: 2022-01-01 | End: 2022-01-01

## 2022-01-01 RX ORDER — LIDOCAINE 40 MG/G
CREAM TOPICAL
Status: CANCELLED | OUTPATIENT
Start: 2022-01-01 | End: 2022-01-01

## 2022-01-01 RX ORDER — AMIODARONE HYDROCHLORIDE 200 MG/1
200 TABLET ORAL DAILY
Status: DISCONTINUED | OUTPATIENT
Start: 2022-04-17 | End: 2022-04-21 | Stop reason: HOSPADM

## 2022-01-01 RX ORDER — PHENYLEPHRINE HCL IN 0.9% NACL 50MG/250ML
.5-6 PLASTIC BAG, INJECTION (ML) INTRAVENOUS CONTINUOUS
Status: CANCELLED | OUTPATIENT
Start: 2022-01-01

## 2022-01-01 RX ORDER — CALCIUM CHLORIDE, MAGNESIUM CHLORIDE, DEXTROSE MONOHYDRATE, LACTIC ACID, SODIUM CHLORIDE, SODIUM BICARBONATE AND POTASSIUM CHLORIDE 5.15; 2.03; 22; 5.4; 6.46; 3.09; .157 G/L; G/L; G/L; G/L; G/L; G/L; G/L
12.5 INJECTION INTRAVENOUS CONTINUOUS
Status: DISCONTINUED | OUTPATIENT
Start: 2022-01-01 | End: 2022-01-01

## 2022-01-01 RX ORDER — AMIODARONE HYDROCHLORIDE 200 MG/1
200 TABLET ORAL 2 TIMES DAILY
Status: DISCONTINUED | OUTPATIENT
Start: 2022-01-01 | End: 2022-01-01

## 2022-01-01 RX ORDER — EPINEPHRINE IN SOD CHLOR,ISO 1 MG/10 ML
SYRINGE (ML) INTRAVENOUS
Status: DISCONTINUED | OUTPATIENT
Start: 2022-01-01 | End: 2022-01-01 | Stop reason: HOSPADM

## 2022-01-01 RX ORDER — FENTANYL CITRATE 50 UG/ML
50 INJECTION, SOLUTION INTRAMUSCULAR; INTRAVENOUS EVERY 10 MIN PRN
Status: DISCONTINUED | OUTPATIENT
Start: 2022-01-01 | End: 2022-04-21 | Stop reason: HOSPADM

## 2022-01-01 RX ORDER — HEPARIN SODIUM 1000 [USP'U]/ML
2000 INJECTION, SOLUTION INTRAVENOUS; SUBCUTANEOUS ONCE
Status: DISCONTINUED | OUTPATIENT
Start: 2022-01-01 | End: 2022-01-01

## 2022-01-01 RX ORDER — CISATRACURIUM BESYLATE 10 MG/ML
10 INJECTION, SOLUTION INTRAVENOUS ONCE
Status: DISCONTINUED | OUTPATIENT
Start: 2022-01-01 | End: 2022-01-01

## 2022-01-01 RX ORDER — AMIODARONE HYDROCHLORIDE 200 MG/1
200 TABLET ORAL 2 TIMES DAILY
Status: DISCONTINUED | OUTPATIENT
Start: 2022-01-01 | End: 2022-04-17 | Stop reason: HOSPADM

## 2022-01-01 RX ORDER — DEXTROSE MONOHYDRATE 100 MG/ML
INJECTION, SOLUTION INTRAVENOUS CONTINUOUS PRN
Status: DISCONTINUED | OUTPATIENT
Start: 2022-01-01 | End: 2022-04-21 | Stop reason: HOSPADM

## 2022-01-01 RX ORDER — LIDOCAINE 40 MG/G
CREAM TOPICAL
Status: CANCELLED | OUTPATIENT
Start: 2022-01-01

## 2022-01-01 RX ORDER — AMIODARONE HYDROCHLORIDE 200 MG/1
200 TABLET ORAL DAILY
Status: DISCONTINUED | OUTPATIENT
Start: 2022-04-17 | End: 2022-01-01

## 2022-01-01 RX ORDER — NOREPINEPHRINE BITARTRATE 0.06 MG/ML
INJECTION, SOLUTION INTRAVENOUS CONTINUOUS PRN
Status: COMPLETED | OUTPATIENT
Start: 2022-01-01 | End: 2022-01-01

## 2022-01-01 RX ORDER — POTASSIUM CHLORIDE 29.8 MG/ML
20 INJECTION INTRAVENOUS
Status: COMPLETED | OUTPATIENT
Start: 2022-01-01 | End: 2022-01-01

## 2022-01-01 RX ORDER — PIPERACILLIN SODIUM, TAZOBACTAM SODIUM 3; .375 G/15ML; G/15ML
3.38 INJECTION, POWDER, LYOPHILIZED, FOR SOLUTION INTRAVENOUS EVERY 6 HOURS
Status: DISCONTINUED | OUTPATIENT
Start: 2022-01-01 | End: 2022-01-01

## 2022-01-01 RX ORDER — PIPERACILLIN SODIUM, TAZOBACTAM SODIUM 3; .375 G/15ML; G/15ML
3.38 INJECTION, POWDER, LYOPHILIZED, FOR SOLUTION INTRAVENOUS EVERY 6 HOURS
Status: DISCONTINUED | OUTPATIENT
Start: 2022-01-01 | End: 2022-04-21 | Stop reason: HOSPADM

## 2022-01-01 RX ORDER — HEPARIN SODIUM 10000 [USP'U]/100ML
10-50 INJECTION, SOLUTION INTRAVENOUS CONTINUOUS
Status: CANCELLED | OUTPATIENT
Start: 2022-01-01

## 2022-01-01 RX ORDER — HEPARIN SODIUM (PORCINE) LOCK FLUSH IV SOLN 100 UNIT/ML 100 UNIT/ML
5-10 SOLUTION INTRAVENOUS EVERY 30 MIN PRN
Status: CANCELLED | OUTPATIENT
Start: 2022-01-01

## 2022-01-01 RX ORDER — AMINO AC/PROTEIN HYDR/WHEY PRO 10G-100/30
2 LIQUID (ML) ORAL 4 TIMES DAILY
Status: DISCONTINUED | OUTPATIENT
Start: 2022-01-01 | End: 2022-04-21 | Stop reason: HOSPADM

## 2022-01-01 RX ADMIN — CALCIUM CHLORIDE, MAGNESIUM CHLORIDE, SODIUM CHLORIDE, SODIUM BICARBONATE, POTASSIUM CHLORIDE AND SODIUM PHOSPHATE DIBASIC DIHYDRATE 12.5 ML/KG/HR: 3.68; 3.05; 6.34; 3.09; .314; .187 INJECTION INTRAVENOUS at 22:38

## 2022-01-01 RX ADMIN — THIAMINE HCL TAB 100 MG 100 MG: 100 TAB at 15:21

## 2022-01-01 RX ADMIN — CALCIUM GLUCONATE 2 G: 20 INJECTION, SOLUTION INTRAVENOUS at 13:09

## 2022-01-01 RX ADMIN — THIAMINE HCL TAB 100 MG 100 MG: 100 TAB at 07:31

## 2022-01-01 RX ADMIN — CALCIUM CHLORIDE, MAGNESIUM CHLORIDE, SODIUM CHLORIDE, SODIUM BICARBONATE, POTASSIUM CHLORIDE AND SODIUM PHOSPHATE DIBASIC DIHYDRATE: 3.68; 3.05; 6.34; 3.09; .314; .187 INJECTION INTRAVENOUS at 08:10

## 2022-01-01 RX ADMIN — EPINEPHRINE 0.3 MCG/KG/MIN: 1 INJECTION PARENTERAL at 20:01

## 2022-01-01 RX ADMIN — CALCIUM CHLORIDE, MAGNESIUM CHLORIDE, SODIUM CHLORIDE, SODIUM BICARBONATE, POTASSIUM CHLORIDE AND SODIUM PHOSPHATE DIBASIC DIHYDRATE 12.5 ML/KG/HR: 3.68; 3.05; 6.34; 3.09; .314; .187 INJECTION INTRAVENOUS at 22:07

## 2022-01-01 RX ADMIN — CALCIUM CHLORIDE, MAGNESIUM CHLORIDE, SODIUM CHLORIDE, SODIUM BICARBONATE, POTASSIUM CHLORIDE AND SODIUM PHOSPHATE DIBASIC DIHYDRATE 12.5 ML/KG/HR: 3.68; 3.05; 6.34; 3.09; .314; .187 INJECTION INTRAVENOUS at 22:04

## 2022-01-01 RX ADMIN — CALCIUM GLUCONATE 2 G: 20 INJECTION, SOLUTION INTRAVENOUS at 11:15

## 2022-01-01 RX ADMIN — PIPERACILLIN SODIUM AND TAZOBACTAM SODIUM 2.25 G: 2; .25 INJECTION, POWDER, LYOPHILIZED, FOR SOLUTION INTRAVENOUS at 11:43

## 2022-01-01 RX ADMIN — PIPERACILLIN SODIUM AND TAZOBACTAM SODIUM 4.5 G: 4; .5 INJECTION, POWDER, LYOPHILIZED, FOR SOLUTION INTRAVENOUS at 05:53

## 2022-01-01 RX ADMIN — Medication 2 PACKET: at 19:22

## 2022-01-01 RX ADMIN — CALCIUM CHLORIDE, MAGNESIUM CHLORIDE, DEXTROSE MONOHYDRATE, LACTIC ACID, SODIUM CHLORIDE, SODIUM BICARBONATE AND POTASSIUM CHLORIDE 12.5 ML/KG/HR: 5.15; 2.03; 22; 5.4; 6.46; 3.09; .157 INJECTION INTRAVENOUS at 03:04

## 2022-01-01 RX ADMIN — AMIODARONE HYDROCHLORIDE 400 MG: 200 TABLET ORAL at 07:36

## 2022-01-01 RX ADMIN — CALCIUM CHLORIDE, MAGNESIUM CHLORIDE, DEXTROSE MONOHYDRATE, LACTIC ACID, SODIUM CHLORIDE, SODIUM BICARBONATE AND POTASSIUM CHLORIDE 12.5 ML/KG/HR: 5.15; 2.03; 22; 5.4; 6.46; 3.09; .157 INJECTION INTRAVENOUS at 22:32

## 2022-01-01 RX ADMIN — AMIODARONE HYDROCHLORIDE 400 MG: 200 TABLET ORAL at 19:21

## 2022-01-01 RX ADMIN — CHLORHEXIDINE GLUCONATE 15 ML: 1.2 SOLUTION ORAL at 07:32

## 2022-01-01 RX ADMIN — Medication 200 MCG/HR: at 04:27

## 2022-01-01 RX ADMIN — BIVALIRUDIN 0.02 MG/KG/HR: 250 INJECTION, POWDER, LYOPHILIZED, FOR SOLUTION INTRAVENOUS at 12:11

## 2022-01-01 RX ADMIN — Medication 2 PACKET: at 16:16

## 2022-01-01 RX ADMIN — PIPERACILLIN SODIUM AND TAZOBACTAM SODIUM 2.25 G: 2; .25 INJECTION, POWDER, LYOPHILIZED, FOR SOLUTION INTRAVENOUS at 00:08

## 2022-01-01 RX ADMIN — CHLORHEXIDINE GLUCONATE 15 ML: 1.2 SOLUTION ORAL at 19:37

## 2022-01-01 RX ADMIN — PIPERACILLIN SODIUM AND TAZOBACTAM SODIUM 2.25 G: 2; .25 INJECTION, POWDER, LYOPHILIZED, FOR SOLUTION INTRAVENOUS at 06:01

## 2022-01-01 RX ADMIN — AMIODARONE HYDROCHLORIDE 400 MG: 200 TABLET ORAL at 19:53

## 2022-01-01 RX ADMIN — CALCIUM CHLORIDE, MAGNESIUM CHLORIDE, SODIUM CHLORIDE, SODIUM BICARBONATE, POTASSIUM CHLORIDE AND SODIUM PHOSPHATE DIBASIC DIHYDRATE 12.5 ML/KG/HR: 3.68; 3.05; 6.34; 3.09; .314; .187 INJECTION INTRAVENOUS at 04:00

## 2022-01-01 RX ADMIN — PIPERACILLIN SODIUM AND TAZOBACTAM SODIUM 2.25 G: 2; .25 INJECTION, POWDER, LYOPHILIZED, FOR SOLUTION INTRAVENOUS at 06:14

## 2022-01-01 RX ADMIN — FENTANYL CITRATE 50 MCG: 50 INJECTION, SOLUTION INTRAMUSCULAR; INTRAVENOUS at 09:40

## 2022-01-01 RX ADMIN — FOLIC ACID 1 MG: 1 TABLET ORAL at 15:21

## 2022-01-01 RX ADMIN — SODIUM CHLORIDE: 3 INJECTION, SOLUTION INTRAVENOUS at 12:28

## 2022-01-01 RX ADMIN — DEXTROSE MONOHYDRATE 1000 ML: 100 INJECTION, SOLUTION INTRAVENOUS at 11:11

## 2022-01-01 RX ADMIN — Medication: at 16:14

## 2022-01-01 RX ADMIN — FUROSEMIDE 40 MG: 10 INJECTION, SOLUTION INTRAMUSCULAR; INTRAVENOUS at 10:37

## 2022-01-01 RX ADMIN — CALCIUM CHLORIDE, MAGNESIUM CHLORIDE, SODIUM CHLORIDE, SODIUM BICARBONATE, POTASSIUM CHLORIDE AND SODIUM PHOSPHATE DIBASIC DIHYDRATE 12.5 ML/KG/HR: 3.68; 3.05; 6.34; 3.09; .314; .187 INJECTION INTRAVENOUS at 07:50

## 2022-01-01 RX ADMIN — CALCIUM CHLORIDE, MAGNESIUM CHLORIDE, SODIUM CHLORIDE, SODIUM BICARBONATE, POTASSIUM CHLORIDE AND SODIUM PHOSPHATE DIBASIC DIHYDRATE 12.5 ML/KG/HR: 3.68; 3.05; 6.34; 3.09; .314; .187 INJECTION INTRAVENOUS at 12:18

## 2022-01-01 RX ADMIN — POTASSIUM CHLORIDE 20 MEQ: 29.8 INJECTION, SOLUTION INTRAVENOUS at 04:56

## 2022-01-01 RX ADMIN — FENTANYL CITRATE 50 MCG: 50 INJECTION, SOLUTION INTRAMUSCULAR; INTRAVENOUS at 10:34

## 2022-01-01 RX ADMIN — CALCIUM CHLORIDE, MAGNESIUM CHLORIDE, SODIUM CHLORIDE, SODIUM BICARBONATE, POTASSIUM CHLORIDE AND SODIUM PHOSPHATE DIBASIC DIHYDRATE 12.5 ML/KG/HR: 3.68; 3.05; 6.34; 3.09; .314; .187 INJECTION INTRAVENOUS at 02:50

## 2022-01-01 RX ADMIN — CALCIUM GLUCONATE 2 G: 20 INJECTION, SOLUTION INTRAVENOUS at 05:20

## 2022-01-01 RX ADMIN — CALCIUM CHLORIDE, MAGNESIUM CHLORIDE, SODIUM CHLORIDE, SODIUM BICARBONATE, POTASSIUM CHLORIDE AND SODIUM PHOSPHATE DIBASIC DIHYDRATE 12.5 ML/KG/HR: 3.68; 3.05; 6.34; 3.09; .314; .187 INJECTION INTRAVENOUS at 18:36

## 2022-01-01 RX ADMIN — Medication 40 MG: at 07:54

## 2022-01-01 RX ADMIN — CHLORHEXIDINE GLUCONATE 15 ML: 1.2 SOLUTION ORAL at 19:23

## 2022-01-01 RX ADMIN — CALCIUM CHLORIDE, MAGNESIUM CHLORIDE, SODIUM CHLORIDE, SODIUM BICARBONATE, POTASSIUM CHLORIDE AND SODIUM PHOSPHATE DIBASIC DIHYDRATE 12.5 ML/KG/HR: 3.68; 3.05; 6.34; 3.09; .314; .187 INJECTION INTRAVENOUS at 02:49

## 2022-01-01 RX ADMIN — CALCIUM CHLORIDE, MAGNESIUM CHLORIDE, SODIUM CHLORIDE, SODIUM BICARBONATE, POTASSIUM CHLORIDE AND SODIUM PHOSPHATE DIBASIC DIHYDRATE 12.5 ML/KG/HR: 3.68; 3.05; 6.34; 3.09; .314; .187 INJECTION INTRAVENOUS at 23:18

## 2022-01-01 RX ADMIN — BIVALIRUDIN: 250 INJECTION, POWDER, LYOPHILIZED, FOR SOLUTION INTRAVENOUS at 12:24

## 2022-01-01 RX ADMIN — Medication 4 UNITS/HR: at 20:18

## 2022-01-01 RX ADMIN — CALCIUM CHLORIDE, MAGNESIUM CHLORIDE, SODIUM CHLORIDE, SODIUM BICARBONATE, POTASSIUM CHLORIDE AND SODIUM PHOSPHATE DIBASIC DIHYDRATE 12.5 ML/KG/HR: 3.68; 3.05; 6.34; 3.09; .314; .187 INJECTION INTRAVENOUS at 13:21

## 2022-01-01 RX ADMIN — DEXTROSE MONOHYDRATE: 50 INJECTION, SOLUTION INTRAVENOUS at 15:30

## 2022-01-01 RX ADMIN — Medication 5 ML: at 07:55

## 2022-01-01 RX ADMIN — PIPERACILLIN SODIUM AND TAZOBACTAM SODIUM 4.5 G: 4; .5 INJECTION, POWDER, LYOPHILIZED, FOR SOLUTION INTRAVENOUS at 23:47

## 2022-01-01 RX ADMIN — Medication 5 ML: at 07:41

## 2022-01-01 RX ADMIN — AMIODARONE HYDROCHLORIDE 400 MG: 200 TABLET ORAL at 10:37

## 2022-01-01 RX ADMIN — FOLIC ACID 1 MG: 1 TABLET ORAL at 07:35

## 2022-01-01 RX ADMIN — DEXTROSE MONOHYDRATE: 50 INJECTION, SOLUTION INTRAVENOUS at 22:41

## 2022-01-01 RX ADMIN — DEXTROSE MONOHYDRATE 25 ML: 25 INJECTION, SOLUTION INTRAVENOUS at 23:57

## 2022-01-01 RX ADMIN — CALCIUM CHLORIDE, MAGNESIUM CHLORIDE, SODIUM CHLORIDE, SODIUM BICARBONATE, POTASSIUM CHLORIDE AND SODIUM PHOSPHATE DIBASIC DIHYDRATE 12.5 ML/KG/HR: 3.68; 3.05; 6.34; 3.09; .314; .187 INJECTION INTRAVENOUS at 01:56

## 2022-01-01 RX ADMIN — VANCOMYCIN HYDROCHLORIDE 1500 MG: 10 INJECTION, POWDER, LYOPHILIZED, FOR SOLUTION INTRAVENOUS at 21:23

## 2022-01-01 RX ADMIN — FOLIC ACID 1 MG: 1 TABLET ORAL at 07:41

## 2022-01-01 RX ADMIN — VANCOMYCIN HYDROCHLORIDE 1750 MG: 10 INJECTION, POWDER, LYOPHILIZED, FOR SOLUTION INTRAVENOUS at 16:00

## 2022-01-01 RX ADMIN — MIDAZOLAM 2 MG: 1 INJECTION INTRAMUSCULAR; INTRAVENOUS at 10:33

## 2022-01-01 RX ADMIN — PIPERACILLIN SODIUM AND TAZOBACTAM SODIUM 2.25 G: 2; .25 INJECTION, POWDER, LYOPHILIZED, FOR SOLUTION INTRAVENOUS at 12:15

## 2022-01-01 RX ADMIN — Medication 750 UNITS: at 12:34

## 2022-01-01 RX ADMIN — PIPERACILLIN SODIUM AND TAZOBACTAM SODIUM 4.5 G: 4; .5 INJECTION, POWDER, LYOPHILIZED, FOR SOLUTION INTRAVENOUS at 18:19

## 2022-01-01 RX ADMIN — CALCIUM CHLORIDE, MAGNESIUM CHLORIDE, DEXTROSE MONOHYDRATE, LACTIC ACID, SODIUM CHLORIDE, SODIUM BICARBONATE AND POTASSIUM CHLORIDE 12.5 ML/KG/HR: 5.15; 2.03; 22; 5.4; 6.46; 3.09; .157 INJECTION INTRAVENOUS at 22:34

## 2022-01-01 RX ADMIN — MAGNESIUM SULFATE IN WATER 2 G: 40 INJECTION, SOLUTION INTRAVENOUS at 00:50

## 2022-01-01 RX ADMIN — PIPERACILLIN AND TAZOBACTAM 3.38 G: 3; .375 INJECTION, POWDER, LYOPHILIZED, FOR SOLUTION INTRAVENOUS at 00:05

## 2022-01-01 RX ADMIN — Medication 30 ML: at 12:52

## 2022-01-01 RX ADMIN — Medication 5 ML: at 07:35

## 2022-01-01 RX ADMIN — Medication 2 PACKET: at 19:38

## 2022-01-01 RX ADMIN — CALCIUM CHLORIDE, MAGNESIUM CHLORIDE, SODIUM CHLORIDE, SODIUM BICARBONATE, POTASSIUM CHLORIDE AND SODIUM PHOSPHATE DIBASIC DIHYDRATE 12.5 ML/KG/HR: 3.68; 3.05; 6.34; 3.09; .314; .187 INJECTION INTRAVENOUS at 17:35

## 2022-01-01 RX ADMIN — EPINEPHRINE 0.3 MCG/KG/MIN: 1 INJECTION PARENTERAL at 17:01

## 2022-01-01 RX ADMIN — THIAMINE HCL TAB 100 MG 100 MG: 100 TAB at 07:37

## 2022-01-01 RX ADMIN — CALCIUM GLUCONATE 2 G: 20 INJECTION, SOLUTION INTRAVENOUS at 11:36

## 2022-01-01 RX ADMIN — CALCIUM CHLORIDE, MAGNESIUM CHLORIDE, SODIUM CHLORIDE, SODIUM BICARBONATE, POTASSIUM CHLORIDE AND SODIUM PHOSPHATE DIBASIC DIHYDRATE 12.5 ML/KG/HR: 3.68; 3.05; 6.34; 3.09; .314; .187 INJECTION INTRAVENOUS at 03:37

## 2022-01-01 RX ADMIN — CALCIUM CHLORIDE, MAGNESIUM CHLORIDE, SODIUM CHLORIDE, SODIUM BICARBONATE, POTASSIUM CHLORIDE AND SODIUM PHOSPHATE DIBASIC DIHYDRATE 12.5 ML/KG/HR: 3.68; 3.05; 6.34; 3.09; .314; .187 INJECTION INTRAVENOUS at 08:33

## 2022-01-01 RX ADMIN — AMIODARONE HYDROCHLORIDE 400 MG: 200 TABLET ORAL at 07:41

## 2022-01-01 RX ADMIN — CALCIUM CHLORIDE, MAGNESIUM CHLORIDE, DEXTROSE MONOHYDRATE, LACTIC ACID, SODIUM CHLORIDE, SODIUM BICARBONATE AND POTASSIUM CHLORIDE 12.5 ML/KG/HR: 5.15; 2.03; 22; 5.4; 6.46; 3.09; .157 INJECTION INTRAVENOUS at 12:12

## 2022-01-01 RX ADMIN — NICARDIPINE HYDROCHLORIDE 7.5 MG/HR: 2.5 INJECTION INTRAVENOUS at 04:00

## 2022-01-01 RX ADMIN — CHLORHEXIDINE GLUCONATE 15 ML: 1.2 SOLUTION ORAL at 07:35

## 2022-01-01 RX ADMIN — PIPERACILLIN AND TAZOBACTAM 3.38 G: 3; .375 INJECTION, POWDER, LYOPHILIZED, FOR SOLUTION INTRAVENOUS at 06:10

## 2022-01-01 RX ADMIN — CHLORHEXIDINE GLUCONATE 15 ML: 1.2 SOLUTION ORAL at 07:46

## 2022-01-01 RX ADMIN — Medication 2 PACKET: at 19:31

## 2022-01-01 RX ADMIN — Medication 2 PACKET: at 16:56

## 2022-01-01 RX ADMIN — DEXTROSE MONOHYDRATE 1000 ML: 100 INJECTION, SOLUTION INTRAVENOUS at 22:57

## 2022-01-01 RX ADMIN — PIPERACILLIN AND TAZOBACTAM 3.38 G: 3; .375 INJECTION, POWDER, LYOPHILIZED, FOR SOLUTION INTRAVENOUS at 11:59

## 2022-01-01 RX ADMIN — CALCIUM CHLORIDE, MAGNESIUM CHLORIDE, SODIUM CHLORIDE, SODIUM BICARBONATE, POTASSIUM CHLORIDE AND SODIUM PHOSPHATE DIBASIC DIHYDRATE 12.5 ML/KG/HR: 3.68; 3.05; 6.34; 3.09; .314; .187 INJECTION INTRAVENOUS at 05:12

## 2022-01-01 RX ADMIN — CALCIUM CHLORIDE, MAGNESIUM CHLORIDE, SODIUM CHLORIDE, SODIUM BICARBONATE, POTASSIUM CHLORIDE AND SODIUM PHOSPHATE DIBASIC DIHYDRATE 12.5 ML/KG/HR: 3.68; 3.05; 6.34; 3.09; .314; .187 INJECTION INTRAVENOUS at 19:55

## 2022-01-01 RX ADMIN — SODIUM CHLORIDE, POTASSIUM CHLORIDE, SODIUM LACTATE AND CALCIUM CHLORIDE 250 ML: 600; 310; 30; 20 INJECTION, SOLUTION INTRAVENOUS at 10:30

## 2022-01-01 RX ADMIN — VANCOMYCIN HYDROCHLORIDE 1750 MG: 1 INJECTION, POWDER, LYOPHILIZED, FOR SOLUTION INTRAVENOUS at 18:47

## 2022-01-01 RX ADMIN — DEXTROSE MONOHYDRATE: 100 INJECTION, SOLUTION INTRAVENOUS at 16:24

## 2022-01-01 RX ADMIN — AMIODARONE HYDROCHLORIDE 400 MG: 200 TABLET ORAL at 07:39

## 2022-01-01 RX ADMIN — PIPERACILLIN SODIUM AND TAZOBACTAM SODIUM 3.38 G: 3; .375 INJECTION, POWDER, LYOPHILIZED, FOR SOLUTION INTRAVENOUS at 17:48

## 2022-01-01 RX ADMIN — EPINEPHRINE 0.06 MCG/KG/MIN: 1 INJECTION PARENTERAL at 19:55

## 2022-01-01 RX ADMIN — PIPERACILLIN SODIUM AND TAZOBACTAM SODIUM 4.5 G: 4; .5 INJECTION, POWDER, LYOPHILIZED, FOR SOLUTION INTRAVENOUS at 05:50

## 2022-01-01 RX ADMIN — MIDAZOLAM HYDROCHLORIDE 1 MG/HR: 1 INJECTION, SOLUTION INTRAVENOUS at 17:30

## 2022-01-01 RX ADMIN — CALCIUM CHLORIDE, MAGNESIUM CHLORIDE, SODIUM CHLORIDE, SODIUM BICARBONATE, POTASSIUM CHLORIDE AND SODIUM PHOSPHATE DIBASIC DIHYDRATE: 3.68; 3.05; 6.34; 3.09; .314; .187 INJECTION INTRAVENOUS at 16:12

## 2022-01-01 RX ADMIN — MIDAZOLAM HYDROCHLORIDE 4 MG/HR: 1 INJECTION, SOLUTION INTRAVENOUS at 11:49

## 2022-01-01 RX ADMIN — MIDAZOLAM HYDROCHLORIDE 8 MG/HR: 1 INJECTION, SOLUTION INTRAVENOUS at 16:03

## 2022-01-01 RX ADMIN — CALCIUM CHLORIDE, MAGNESIUM CHLORIDE, DEXTROSE MONOHYDRATE, LACTIC ACID, SODIUM CHLORIDE, SODIUM BICARBONATE AND POTASSIUM CHLORIDE 12.5 ML/KG/HR: 5.15; 2.03; 22; 5.4; 6.46; 3.09; .157 INJECTION INTRAVENOUS at 12:13

## 2022-01-01 RX ADMIN — Medication 2 PACKET: at 11:46

## 2022-01-01 RX ADMIN — CALCIUM CHLORIDE, MAGNESIUM CHLORIDE, SODIUM CHLORIDE, SODIUM BICARBONATE, POTASSIUM CHLORIDE AND SODIUM PHOSPHATE DIBASIC DIHYDRATE 12.5 ML/KG/HR: 3.68; 3.05; 6.34; 3.09; .314; .187 INJECTION INTRAVENOUS at 12:14

## 2022-01-01 RX ADMIN — Medication 200 MCG/HR: at 03:44

## 2022-01-01 RX ADMIN — DEXTROSE MONOHYDRATE: 50 INJECTION, SOLUTION INTRAVENOUS at 06:58

## 2022-01-01 RX ADMIN — PIPERACILLIN SODIUM AND TAZOBACTAM SODIUM 4.5 G: 4; .5 INJECTION, POWDER, LYOPHILIZED, FOR SOLUTION INTRAVENOUS at 12:15

## 2022-01-01 RX ADMIN — Medication 2 PACKET: at 15:31

## 2022-01-01 RX ADMIN — CHLORHEXIDINE GLUCONATE 15 ML: 1.2 SOLUTION ORAL at 19:53

## 2022-01-01 RX ADMIN — CHLORHEXIDINE GLUCONATE 15 ML: 1.2 SOLUTION ORAL at 11:23

## 2022-01-01 RX ADMIN — DEXTROSE MONOHYDRATE: 100 INJECTION, SOLUTION INTRAVENOUS at 19:00

## 2022-01-01 RX ADMIN — Medication 40 MG: at 07:35

## 2022-01-01 RX ADMIN — CALCIUM CHLORIDE, MAGNESIUM CHLORIDE, DEXTROSE MONOHYDRATE, LACTIC ACID, SODIUM CHLORIDE, SODIUM BICARBONATE AND POTASSIUM CHLORIDE 12.5 ML/KG/HR: 5.15; 2.03; 22; 5.4; 6.46; 3.09; .157 INJECTION INTRAVENOUS at 18:02

## 2022-01-01 RX ADMIN — FOLIC ACID 1 MG: 1 TABLET ORAL at 07:37

## 2022-01-01 RX ADMIN — CALCIUM CHLORIDE, MAGNESIUM CHLORIDE, SODIUM CHLORIDE, SODIUM BICARBONATE, POTASSIUM CHLORIDE AND SODIUM PHOSPHATE DIBASIC DIHYDRATE: 3.68; 3.05; 6.34; 3.09; .314; .187 INJECTION INTRAVENOUS at 14:44

## 2022-01-01 RX ADMIN — CHLORHEXIDINE GLUCONATE 15 ML: 1.2 SOLUTION ORAL at 07:39

## 2022-01-01 RX ADMIN — AMIODARONE HYDROCHLORIDE 400 MG: 200 TABLET ORAL at 19:48

## 2022-01-01 RX ADMIN — SODIUM CHLORIDE: 3 INJECTION, SOLUTION INTRAVENOUS at 07:37

## 2022-01-01 RX ADMIN — CALCIUM GLUCONATE 2 G: 20 INJECTION, SOLUTION INTRAVENOUS at 22:16

## 2022-01-01 RX ADMIN — PIPERACILLIN AND TAZOBACTAM 3.38 G: 3; .375 INJECTION, POWDER, LYOPHILIZED, FOR SOLUTION INTRAVENOUS at 17:35

## 2022-01-01 RX ADMIN — Medication 200 MCG/HR: at 16:22

## 2022-01-01 RX ADMIN — CALCIUM GLUCONATE 2 G: 20 INJECTION, SOLUTION INTRAVENOUS at 19:41

## 2022-01-01 RX ADMIN — THIAMINE HCL TAB 100 MG 100 MG: 100 TAB at 07:40

## 2022-01-01 RX ADMIN — Medication 25 MCG/HR: at 18:15

## 2022-01-01 RX ADMIN — POTASSIUM CHLORIDE 20 MEQ: 29.8 INJECTION, SOLUTION INTRAVENOUS at 23:41

## 2022-01-01 RX ADMIN — VANCOMYCIN HYDROCHLORIDE 1500 MG: 10 INJECTION, POWDER, LYOPHILIZED, FOR SOLUTION INTRAVENOUS at 12:34

## 2022-01-01 RX ADMIN — PIPERACILLIN SODIUM AND TAZOBACTAM SODIUM 3.38 G: 3; .375 INJECTION, POWDER, LYOPHILIZED, FOR SOLUTION INTRAVENOUS at 06:19

## 2022-01-01 RX ADMIN — THIAMINE HCL TAB 100 MG 100 MG: 100 TAB at 07:41

## 2022-01-01 RX ADMIN — CALCIUM CHLORIDE, MAGNESIUM CHLORIDE, SODIUM CHLORIDE, SODIUM BICARBONATE, POTASSIUM CHLORIDE AND SODIUM PHOSPHATE DIBASIC DIHYDRATE 12.5 ML/KG/HR: 3.68; 3.05; 6.34; 3.09; .314; .187 INJECTION INTRAVENOUS at 16:12

## 2022-01-01 RX ADMIN — CHLORHEXIDINE GLUCONATE 15 ML: 1.2 SOLUTION ORAL at 07:41

## 2022-01-01 RX ADMIN — AMIODARONE HYDROCHLORIDE 400 MG: 200 TABLET ORAL at 20:06

## 2022-01-01 RX ADMIN — CALCIUM CHLORIDE, MAGNESIUM CHLORIDE, SODIUM CHLORIDE, SODIUM BICARBONATE, POTASSIUM CHLORIDE AND SODIUM PHOSPHATE DIBASIC DIHYDRATE 12.5 ML/KG/HR: 3.68; 3.05; 6.34; 3.09; .314; .187 INJECTION INTRAVENOUS at 06:25

## 2022-01-01 RX ADMIN — CALCIUM CHLORIDE, MAGNESIUM CHLORIDE, SODIUM CHLORIDE, SODIUM BICARBONATE, POTASSIUM CHLORIDE AND SODIUM PHOSPHATE DIBASIC DIHYDRATE 12.5 ML/KG/HR: 3.68; 3.05; 6.34; 3.09; .314; .187 INJECTION INTRAVENOUS at 06:31

## 2022-01-01 RX ADMIN — PIPERACILLIN SODIUM AND TAZOBACTAM SODIUM 4.5 G: 4; .5 INJECTION, POWDER, LYOPHILIZED, FOR SOLUTION INTRAVENOUS at 11:44

## 2022-01-01 RX ADMIN — Medication: at 08:14

## 2022-01-01 RX ADMIN — THIAMINE HCL TAB 100 MG 100 MG: 100 TAB at 07:53

## 2022-01-01 RX ADMIN — POTASSIUM CHLORIDE 20 MEQ: 14.9 INJECTION, SOLUTION INTRAVENOUS at 17:44

## 2022-01-01 RX ADMIN — VANCOMYCIN HYDROCHLORIDE 1500 MG: 10 INJECTION, POWDER, LYOPHILIZED, FOR SOLUTION INTRAVENOUS at 07:41

## 2022-01-01 RX ADMIN — CALCIUM CHLORIDE, MAGNESIUM CHLORIDE, SODIUM CHLORIDE, SODIUM BICARBONATE, POTASSIUM CHLORIDE AND SODIUM PHOSPHATE DIBASIC DIHYDRATE 12.5 ML/KG/HR: 3.68; 3.05; 6.34; 3.09; .314; .187 INJECTION INTRAVENOUS at 18:22

## 2022-01-01 RX ADMIN — CALCIUM GLUCONATE 2 G: 20 INJECTION, SOLUTION INTRAVENOUS at 04:14

## 2022-01-01 RX ADMIN — Medication 0.4 MCG/KG/MIN: at 17:56

## 2022-01-01 RX ADMIN — Medication 5 ML: at 20:51

## 2022-01-01 RX ADMIN — FOLIC ACID 1 MG: 1 TABLET ORAL at 07:40

## 2022-01-01 RX ADMIN — AMIODARONE HYDROCHLORIDE 400 MG: 200 TABLET ORAL at 19:37

## 2022-01-01 RX ADMIN — PIPERACILLIN SODIUM AND TAZOBACTAM SODIUM 3.38 G: 3; .375 INJECTION, POWDER, LYOPHILIZED, FOR SOLUTION INTRAVENOUS at 00:44

## 2022-01-01 RX ADMIN — HEPARIN SODIUM 3 ML/HR: 200 INJECTION, SOLUTION INTRAVENOUS at 17:58

## 2022-01-01 RX ADMIN — GLYCOPYRROLATE 0.2 MG: 0.2 INJECTION INTRAMUSCULAR; INTRAVENOUS at 09:49

## 2022-01-01 RX ADMIN — SODIUM CHLORIDE: 3 INJECTION, SOLUTION INTRAVENOUS at 23:05

## 2022-01-01 RX ADMIN — HUMAN INSULIN 1 UNITS/HR: 100 INJECTION, SOLUTION SUBCUTANEOUS at 03:05

## 2022-01-01 RX ADMIN — AMIODARONE HYDROCHLORIDE 400 MG: 200 TABLET ORAL at 07:31

## 2022-01-01 RX ADMIN — CALCIUM CHLORIDE 1 G: 100 INJECTION, SOLUTION INTRAVENOUS at 16:25

## 2022-01-01 RX ADMIN — PIPERACILLIN AND TAZOBACTAM 3.38 G: 3; .375 INJECTION, POWDER, LYOPHILIZED, FOR SOLUTION INTRAVENOUS at 23:50

## 2022-01-01 RX ADMIN — CALCIUM CHLORIDE, MAGNESIUM CHLORIDE, SODIUM CHLORIDE, SODIUM BICARBONATE, POTASSIUM CHLORIDE AND SODIUM PHOSPHATE DIBASIC DIHYDRATE 12.5 ML/KG/HR: 3.68; 3.05; 6.34; 3.09; .314; .187 INJECTION INTRAVENOUS at 12:17

## 2022-01-01 RX ADMIN — PIPERACILLIN AND TAZOBACTAM 3.38 G: 3; .375 INJECTION, POWDER, LYOPHILIZED, FOR SOLUTION INTRAVENOUS at 12:15

## 2022-01-01 RX ADMIN — Medication 40 MG: at 07:41

## 2022-01-01 RX ADMIN — Medication 2 PACKET: at 11:26

## 2022-01-01 RX ADMIN — CALCIUM GLUCONATE 2 G: 20 INJECTION, SOLUTION INTRAVENOUS at 05:47

## 2022-01-01 RX ADMIN — SODIUM CHLORIDE: 3 INJECTION, SOLUTION INTRAVENOUS at 01:53

## 2022-01-01 RX ADMIN — VANCOMYCIN HYDROCHLORIDE 1500 MG: 10 INJECTION, POWDER, LYOPHILIZED, FOR SOLUTION INTRAVENOUS at 11:38

## 2022-01-01 RX ADMIN — Medication 5 ML: at 15:21

## 2022-01-01 RX ADMIN — Medication 0.03 MCG/KG/MIN: at 22:32

## 2022-01-01 RX ADMIN — Medication 2 PACKET: at 20:07

## 2022-01-01 RX ADMIN — CALCIUM CHLORIDE, MAGNESIUM CHLORIDE, SODIUM CHLORIDE, SODIUM BICARBONATE, POTASSIUM CHLORIDE AND SODIUM PHOSPHATE DIBASIC DIHYDRATE 12.5 ML/KG/HR: 3.68; 3.05; 6.34; 3.09; .314; .187 INJECTION INTRAVENOUS at 14:43

## 2022-01-01 RX ADMIN — Medication 2 UNITS/HR: at 00:10

## 2022-01-01 RX ADMIN — PANTOPRAZOLE SODIUM 40 MG: 40 INJECTION, POWDER, FOR SOLUTION INTRAVENOUS at 08:13

## 2022-01-01 RX ADMIN — Medication 750 UNITS: at 05:32

## 2022-01-01 RX ADMIN — Medication 2 PACKET: at 15:21

## 2022-01-01 RX ADMIN — CALCIUM GLUCONATE 2 G: 20 INJECTION, SOLUTION INTRAVENOUS at 00:52

## 2022-01-01 RX ADMIN — AMIODARONE HYDROCHLORIDE 400 MG: 200 TABLET ORAL at 07:34

## 2022-01-01 RX ADMIN — PIPERACILLIN AND TAZOBACTAM 3.38 G: 3; .375 INJECTION, POWDER, LYOPHILIZED, FOR SOLUTION INTRAVENOUS at 18:50

## 2022-01-01 RX ADMIN — THIAMINE HCL TAB 100 MG 100 MG: 100 TAB at 07:34

## 2022-01-01 RX ADMIN — CALCIUM CHLORIDE, MAGNESIUM CHLORIDE, SODIUM CHLORIDE, SODIUM BICARBONATE, POTASSIUM CHLORIDE AND SODIUM PHOSPHATE DIBASIC DIHYDRATE 12.5 ML/KG/HR: 3.68; 3.05; 6.34; 3.09; .314; .187 INJECTION INTRAVENOUS at 00:30

## 2022-01-01 RX ADMIN — CALCIUM CHLORIDE 1 G: 100 INJECTION, SOLUTION INTRAVENOUS at 00:51

## 2022-01-01 RX ADMIN — CALCIUM GLUCONATE 2 G: 20 INJECTION, SOLUTION INTRAVENOUS at 01:52

## 2022-01-01 RX ADMIN — Medication: at 00:08

## 2022-01-01 RX ADMIN — CALCIUM CHLORIDE, MAGNESIUM CHLORIDE, DEXTROSE MONOHYDRATE, LACTIC ACID, SODIUM CHLORIDE, SODIUM BICARBONATE AND POTASSIUM CHLORIDE: 5.15; 2.03; 22; 5.4; 6.46; 3.09; .157 INJECTION INTRAVENOUS at 12:12

## 2022-01-01 RX ADMIN — PIPERACILLIN SODIUM AND TAZOBACTAM SODIUM 2.25 G: 2; .25 INJECTION, POWDER, LYOPHILIZED, FOR SOLUTION INTRAVENOUS at 00:28

## 2022-01-01 RX ADMIN — CALCIUM CHLORIDE, MAGNESIUM CHLORIDE, SODIUM CHLORIDE, SODIUM BICARBONATE, POTASSIUM CHLORIDE AND SODIUM PHOSPHATE DIBASIC DIHYDRATE 12.5 ML/KG/HR: 3.68; 3.05; 6.34; 3.09; .314; .187 INJECTION INTRAVENOUS at 09:26

## 2022-01-01 RX ADMIN — Medication 2 PACKET: at 11:56

## 2022-01-01 RX ADMIN — NICARDIPINE HYDROCHLORIDE 5 MG/HR: 2.5 INJECTION INTRAVENOUS at 12:28

## 2022-01-01 RX ADMIN — Medication 2 PACKET: at 19:37

## 2022-01-01 RX ADMIN — EPINEPHRINE 0.06 MCG/KG/MIN: 1 INJECTION PARENTERAL at 19:21

## 2022-01-01 RX ADMIN — NICARDIPINE HYDROCHLORIDE 0.5 MG/HR: 2.5 INJECTION INTRAVENOUS at 12:32

## 2022-01-01 RX ADMIN — Medication 2 PACKET: at 16:06

## 2022-01-01 RX ADMIN — SODIUM CHLORIDE: 3 INJECTION, SOLUTION INTRAVENOUS at 17:59

## 2022-01-01 RX ADMIN — SODIUM CHLORIDE: 3 INJECTION, SOLUTION INTRAVENOUS at 02:12

## 2022-01-01 RX ADMIN — Medication 2 PACKET: at 07:42

## 2022-01-01 RX ADMIN — CALCIUM GLUCONATE 2 G: 20 INJECTION, SOLUTION INTRAVENOUS at 05:32

## 2022-01-01 RX ADMIN — CALCIUM GLUCONATE 2 G: 20 INJECTION, SOLUTION INTRAVENOUS at 23:00

## 2022-01-01 RX ADMIN — EPINEPHRINE 0.06 MCG/KG/MIN: 1 INJECTION PARENTERAL at 04:28

## 2022-01-01 RX ADMIN — CHLORHEXIDINE GLUCONATE 15 ML: 1.2 SOLUTION ORAL at 19:21

## 2022-01-01 RX ADMIN — CHLORHEXIDINE GLUCONATE 15 ML: 1.2 SOLUTION ORAL at 20:06

## 2022-01-01 RX ADMIN — PIPERACILLIN SODIUM AND TAZOBACTAM SODIUM 2.25 G: 2; .25 INJECTION, POWDER, LYOPHILIZED, FOR SOLUTION INTRAVENOUS at 01:13

## 2022-01-01 RX ADMIN — PIPERACILLIN SODIUM AND TAZOBACTAM SODIUM 4.5 G: 4; .5 INJECTION, POWDER, LYOPHILIZED, FOR SOLUTION INTRAVENOUS at 23:55

## 2022-01-01 RX ADMIN — Medication 2 PACKET: at 12:13

## 2022-01-01 RX ADMIN — CALCIUM GLUCONATE 2 G: 20 INJECTION, SOLUTION INTRAVENOUS at 07:00

## 2022-01-01 RX ADMIN — CHLORHEXIDINE GLUCONATE 15 ML: 1.2 SOLUTION ORAL at 07:38

## 2022-01-01 RX ADMIN — SODIUM PHOSPHATE, MONOBASIC, MONOHYDRATE AND SODIUM PHOSPHATE, DIBASIC, ANHYDROUS 15 MMOL: 276; 142 INJECTION, SOLUTION INTRAVENOUS at 05:31

## 2022-01-01 RX ADMIN — CALCIUM CHLORIDE, MAGNESIUM CHLORIDE, SODIUM CHLORIDE, SODIUM BICARBONATE, POTASSIUM CHLORIDE AND SODIUM PHOSPHATE DIBASIC DIHYDRATE: 3.68; 3.05; 6.34; 3.09; .314; .187 INJECTION INTRAVENOUS at 13:21

## 2022-01-01 RX ADMIN — PIPERACILLIN SODIUM AND TAZOBACTAM SODIUM 4.5 G: 4; .5 INJECTION, POWDER, LYOPHILIZED, FOR SOLUTION INTRAVENOUS at 18:23

## 2022-01-01 RX ADMIN — Medication 2 PACKET: at 11:59

## 2022-01-01 RX ADMIN — Medication 2 PACKET: at 07:56

## 2022-01-01 RX ADMIN — Medication 3 MCG/KG/MIN: at 18:05

## 2022-01-01 RX ADMIN — CALCIUM CHLORIDE, MAGNESIUM CHLORIDE, SODIUM CHLORIDE, SODIUM BICARBONATE, POTASSIUM CHLORIDE AND SODIUM PHOSPHATE DIBASIC DIHYDRATE 12.5 ML/KG/HR: 3.68; 3.05; 6.34; 3.09; .314; .187 INJECTION INTRAVENOUS at 18:21

## 2022-01-01 RX ADMIN — Medication 40 MG: at 07:32

## 2022-01-01 RX ADMIN — AMIODARONE HYDROCHLORIDE 1 MG/MIN: 50 INJECTION, SOLUTION INTRAVENOUS at 18:13

## 2022-01-01 RX ADMIN — Medication 2 PACKET: at 16:24

## 2022-01-01 RX ADMIN — Medication 30 ML: at 13:55

## 2022-01-01 RX ADMIN — PIPERACILLIN SODIUM AND TAZOBACTAM SODIUM 2.25 G: 2; .25 INJECTION, POWDER, LYOPHILIZED, FOR SOLUTION INTRAVENOUS at 05:29

## 2022-01-01 RX ADMIN — SODIUM CHLORIDE: 3 INJECTION, SOLUTION INTRAVENOUS at 05:52

## 2022-01-01 RX ADMIN — VASOPRESSIN 4 UNITS/HR: 20 INJECTION INTRAVENOUS at 17:55

## 2022-01-01 RX ADMIN — PIPERACILLIN SODIUM AND TAZOBACTAM SODIUM 2.25 G: 2; .25 INJECTION, POWDER, LYOPHILIZED, FOR SOLUTION INTRAVENOUS at 17:27

## 2022-01-01 RX ADMIN — AMIODARONE HYDROCHLORIDE 400 MG: 200 TABLET ORAL at 07:53

## 2022-01-01 RX ADMIN — CALCIUM CHLORIDE, MAGNESIUM CHLORIDE, SODIUM CHLORIDE, SODIUM BICARBONATE, POTASSIUM CHLORIDE AND SODIUM PHOSPHATE DIBASIC DIHYDRATE 12.5 ML/KG/HR: 3.68; 3.05; 6.34; 3.09; .314; .187 INJECTION INTRAVENOUS at 14:44

## 2022-01-01 RX ADMIN — HEPARIN SODIUM AND DEXTROSE 700 UNITS/HR: 10000; 5 INJECTION INTRAVENOUS at 08:07

## 2022-01-01 RX ADMIN — CALCIUM GLUCONATE 2 G: 20 INJECTION, SOLUTION INTRAVENOUS at 12:49

## 2022-01-01 RX ADMIN — CHLORHEXIDINE GLUCONATE 15 ML: 1.2 SOLUTION ORAL at 19:31

## 2022-01-01 RX ADMIN — CALCIUM CHLORIDE 1 G: 100 INJECTION, SOLUTION INTRAVENOUS at 05:00

## 2022-01-01 RX ADMIN — CALCIUM GLUCONATE 2 G: 20 INJECTION, SOLUTION INTRAVENOUS at 23:56

## 2022-01-01 RX ADMIN — PIPERACILLIN AND TAZOBACTAM 3.38 G: 3; .375 INJECTION, POWDER, LYOPHILIZED, FOR SOLUTION INTRAVENOUS at 17:30

## 2022-01-01 RX ADMIN — CHLORHEXIDINE GLUCONATE 15 ML: 1.2 SOLUTION ORAL at 07:53

## 2022-01-01 RX ADMIN — SODIUM CHLORIDE, POTASSIUM CHLORIDE, SODIUM LACTATE AND CALCIUM CHLORIDE 500 ML: 600; 310; 30; 20 INJECTION, SOLUTION INTRAVENOUS at 04:20

## 2022-01-01 RX ADMIN — Medication 2 PACKET: at 16:17

## 2022-01-01 RX ADMIN — Medication 2 PACKET: at 11:57

## 2022-01-01 RX ADMIN — CALCIUM CHLORIDE, MAGNESIUM CHLORIDE, SODIUM CHLORIDE, SODIUM BICARBONATE, POTASSIUM CHLORIDE AND SODIUM PHOSPHATE DIBASIC DIHYDRATE 12.5 ML/KG/HR: 3.68; 3.05; 6.34; 3.09; .314; .187 INJECTION INTRAVENOUS at 17:23

## 2022-01-01 RX ADMIN — CALCIUM GLUCONATE 2 G: 20 INJECTION, SOLUTION INTRAVENOUS at 16:05

## 2022-01-01 RX ADMIN — SODIUM CHLORIDE: 3 INJECTION, SOLUTION INTRAVENOUS at 08:28

## 2022-01-01 RX ADMIN — Medication: at 18:46

## 2022-01-01 RX ADMIN — DEXTROSE MONOHYDRATE: 100 INJECTION, SOLUTION INTRAVENOUS at 06:25

## 2022-01-01 RX ADMIN — PIPERACILLIN AND TAZOBACTAM 3.38 G: 3; .375 INJECTION, POWDER, LYOPHILIZED, FOR SOLUTION INTRAVENOUS at 11:56

## 2022-01-01 RX ADMIN — PIPERACILLIN SODIUM AND TAZOBACTAM SODIUM 4.5 G: 4; .5 INJECTION, POWDER, LYOPHILIZED, FOR SOLUTION INTRAVENOUS at 18:51

## 2022-01-01 RX ADMIN — Medication 2 PACKET: at 19:56

## 2022-01-01 RX ADMIN — POTASSIUM CHLORIDE 20 MEQ: 29.8 INJECTION, SOLUTION INTRAVENOUS at 12:22

## 2022-01-01 RX ADMIN — Medication 2 PACKET: at 07:38

## 2022-01-01 RX ADMIN — MIDAZOLAM HYDROCHLORIDE 8 MG/HR: 1 INJECTION, SOLUTION INTRAVENOUS at 03:44

## 2022-01-01 RX ADMIN — HEPARIN SODIUM 26000 UNITS: 1000 INJECTION INTRAVENOUS; SUBCUTANEOUS at 10:31

## 2022-01-01 RX ADMIN — AMIODARONE HYDROCHLORIDE 0.5 MG/MIN: 50 INJECTION, SOLUTION INTRAVENOUS at 06:06

## 2022-01-01 RX ADMIN — CALCIUM GLUCONATE 2 G: 20 INJECTION, SOLUTION INTRAVENOUS at 18:09

## 2022-01-01 RX ADMIN — AMIODARONE HYDROCHLORIDE 400 MG: 200 TABLET ORAL at 19:31

## 2022-01-01 RX ADMIN — MIDAZOLAM HYDROCHLORIDE 8 MG/HR: 1 INJECTION, SOLUTION INTRAVENOUS at 04:27

## 2022-01-01 RX ADMIN — Medication 0.04 MCG/KG/MIN: at 21:54

## 2022-01-01 RX ADMIN — CALCIUM CHLORIDE, MAGNESIUM CHLORIDE, SODIUM CHLORIDE, SODIUM BICARBONATE, POTASSIUM CHLORIDE AND SODIUM PHOSPHATE DIBASIC DIHYDRATE 12.5 ML/KG/HR: 3.68; 3.05; 6.34; 3.09; .314; .187 INJECTION INTRAVENOUS at 18:35

## 2022-01-01 RX ADMIN — PIPERACILLIN SODIUM AND TAZOBACTAM SODIUM 2.25 G: 2; .25 INJECTION, POWDER, LYOPHILIZED, FOR SOLUTION INTRAVENOUS at 17:23

## 2022-01-01 RX ADMIN — SODIUM CHLORIDE, POTASSIUM CHLORIDE, SODIUM LACTATE AND CALCIUM CHLORIDE 250 ML: 600; 310; 30; 20 INJECTION, SOLUTION INTRAVENOUS at 11:12

## 2022-01-01 RX ADMIN — PIPERACILLIN SODIUM AND TAZOBACTAM SODIUM 2.25 G: 2; .25 INJECTION, POWDER, LYOPHILIZED, FOR SOLUTION INTRAVENOUS at 11:26

## 2022-01-01 RX ADMIN — NICARDIPINE HYDROCHLORIDE 5 MG/HR: 2.5 INJECTION INTRAVENOUS at 07:38

## 2022-01-01 RX ADMIN — SODIUM CHLORIDE: 3 INJECTION, SOLUTION INTRAVENOUS at 15:31

## 2022-01-01 RX ADMIN — PIPERACILLIN AND TAZOBACTAM 3.38 G: 3; .375 INJECTION, POWDER, LYOPHILIZED, FOR SOLUTION INTRAVENOUS at 06:08

## 2022-01-01 RX ADMIN — SODIUM CHLORIDE: 3 INJECTION, SOLUTION INTRAVENOUS at 06:37

## 2022-01-01 RX ADMIN — Medication 2 PACKET: at 19:49

## 2022-01-01 RX ADMIN — Medication 5 ML: at 07:32

## 2022-01-01 RX ADMIN — PANTOPRAZOLE SODIUM 40 MG: 40 INJECTION, POWDER, FOR SOLUTION INTRAVENOUS at 07:46

## 2022-01-01 RX ADMIN — FOLIC ACID 1 MG: 1 TABLET ORAL at 07:31

## 2022-01-01 RX ADMIN — Medication 2 PACKET: at 07:36

## 2022-01-01 RX ADMIN — HEPARIN SODIUM 3 ML/HR: 200 INJECTION, SOLUTION INTRAVENOUS at 15:59

## 2022-01-01 RX ADMIN — HEPARIN SODIUM AND DEXTROSE 900 UNITS/HR: 10000; 5 INJECTION INTRAVENOUS at 06:11

## 2022-01-01 RX ADMIN — VANCOMYCIN HYDROCHLORIDE 1500 MG: 10 INJECTION, POWDER, LYOPHILIZED, FOR SOLUTION INTRAVENOUS at 07:57

## 2022-01-01 RX ADMIN — CALCIUM CHLORIDE, MAGNESIUM CHLORIDE, SODIUM CHLORIDE, SODIUM BICARBONATE, POTASSIUM CHLORIDE AND SODIUM PHOSPHATE DIBASIC DIHYDRATE 12.5 ML/KG/HR: 3.68; 3.05; 6.34; 3.09; .314; .187 INJECTION INTRAVENOUS at 03:36

## 2022-01-01 RX ADMIN — NICARDIPINE HYDROCHLORIDE 5 MG/HR: 2.5 INJECTION INTRAVENOUS at 18:53

## 2022-01-01 RX ADMIN — SODIUM CHLORIDE: 3 INJECTION, SOLUTION INTRAVENOUS at 18:51

## 2022-01-01 RX ADMIN — CHLORHEXIDINE GLUCONATE 15 ML: 1.2 SOLUTION ORAL at 19:47

## 2022-01-01 RX ADMIN — Medication 2 PACKET: at 07:32

## 2022-01-01 RX ADMIN — CALCIUM CHLORIDE, MAGNESIUM CHLORIDE, SODIUM CHLORIDE, SODIUM BICARBONATE, POTASSIUM CHLORIDE AND SODIUM PHOSPHATE DIBASIC DIHYDRATE: 3.68; 3.05; 6.34; 3.09; .314; .187 INJECTION INTRAVENOUS at 18:46

## 2022-01-01 RX ADMIN — FOLIC ACID 1 MG: 1 TABLET ORAL at 07:53

## 2022-01-01 RX ADMIN — CALCIUM CHLORIDE, MAGNESIUM CHLORIDE, SODIUM CHLORIDE, SODIUM BICARBONATE, POTASSIUM CHLORIDE AND SODIUM PHOSPHATE DIBASIC DIHYDRATE 12.5 ML/KG/HR: 3.68; 3.05; 6.34; 3.09; .314; .187 INJECTION INTRAVENOUS at 23:17

## 2022-01-01 RX ADMIN — Medication 0.4 MCG/KG/MIN: at 21:30

## 2022-01-01 ASSESSMENT — ACTIVITIES OF DAILY LIVING (ADL)
ADLS_ACUITY_SCORE: 24
ADLS_ACUITY_SCORE: 26
ADLS_ACUITY_SCORE: 24
ADLS_ACUITY_SCORE: 20
ADLS_ACUITY_SCORE: 24
ADLS_ACUITY_SCORE: 26
ADLS_ACUITY_SCORE: 24
ADLS_ACUITY_SCORE: 24
ADLS_ACUITY_SCORE: 22
ADLS_ACUITY_SCORE: 24
ADLS_ACUITY_SCORE: 24
ADLS_ACUITY_SCORE: 20
ADLS_ACUITY_SCORE: 22
ADLS_ACUITY_SCORE: 24
ADLS_ACUITY_SCORE: 22
ADLS_ACUITY_SCORE: 24
ADLS_ACUITY_SCORE: 24
ADLS_ACUITY_SCORE: 26
ADLS_ACUITY_SCORE: 20
ADLS_ACUITY_SCORE: 24
ADLS_ACUITY_SCORE: 22
ADLS_ACUITY_SCORE: 24
ADLS_ACUITY_SCORE: 26
ADLS_ACUITY_SCORE: 26
ADLS_ACUITY_SCORE: 20
ADLS_ACUITY_SCORE: 22
ADLS_ACUITY_SCORE: 24
ADLS_ACUITY_SCORE: 26
ADLS_ACUITY_SCORE: 24
ADLS_ACUITY_SCORE: 26
ADLS_ACUITY_SCORE: 24
ADLS_ACUITY_SCORE: 26
ADLS_ACUITY_SCORE: 24
ADLS_ACUITY_SCORE: 24
ADLS_ACUITY_SCORE: 20
ADLS_ACUITY_SCORE: 24
ADLS_ACUITY_SCORE: 20
ADLS_ACUITY_SCORE: 24
ADLS_ACUITY_SCORE: 22
ADLS_ACUITY_SCORE: 24
ADLS_ACUITY_SCORE: 22
ADLS_ACUITY_SCORE: 26
ADLS_ACUITY_SCORE: 24
ADLS_ACUITY_SCORE: 24
ADLS_ACUITY_SCORE: 26
ADLS_ACUITY_SCORE: 22
ADLS_ACUITY_SCORE: 24
ADLS_ACUITY_SCORE: 24
ADLS_ACUITY_SCORE: 22
ADLS_ACUITY_SCORE: 24
ADLS_ACUITY_SCORE: 22
ADLS_ACUITY_SCORE: 12
ADLS_ACUITY_SCORE: 22
ADLS_ACUITY_SCORE: 24
ADLS_ACUITY_SCORE: 26
ADLS_ACUITY_SCORE: 22
ADLS_ACUITY_SCORE: 20
ADLS_ACUITY_SCORE: 24
ADLS_ACUITY_SCORE: 22
ADLS_ACUITY_SCORE: 22
ADLS_ACUITY_SCORE: 20
ADLS_ACUITY_SCORE: 24
ADLS_ACUITY_SCORE: 20
ADLS_ACUITY_SCORE: 26
ADLS_ACUITY_SCORE: 24
ADLS_ACUITY_SCORE: 24
ADLS_ACUITY_SCORE: 22
ADLS_ACUITY_SCORE: 22
ADLS_ACUITY_SCORE: 26
ADLS_ACUITY_SCORE: 22
ADLS_ACUITY_SCORE: 24
ADLS_ACUITY_SCORE: 20
ADLS_ACUITY_SCORE: 24
ADLS_ACUITY_SCORE: 22
ADLS_ACUITY_SCORE: 22
ADLS_ACUITY_SCORE: 20
ADLS_ACUITY_SCORE: 24
ADLS_ACUITY_SCORE: 22
ADLS_ACUITY_SCORE: 20
ADLS_ACUITY_SCORE: 24
ADLS_ACUITY_SCORE: 22
ADLS_ACUITY_SCORE: 24
ADLS_ACUITY_SCORE: 26
ADLS_ACUITY_SCORE: 22
ADLS_ACUITY_SCORE: 22
ADLS_ACUITY_SCORE: 24
ADLS_ACUITY_SCORE: 20
ADLS_ACUITY_SCORE: 24
ADLS_ACUITY_SCORE: 22
ADLS_ACUITY_SCORE: 24
ADLS_ACUITY_SCORE: 24
ADLS_ACUITY_SCORE: 22
ADLS_ACUITY_SCORE: 24
ADLS_ACUITY_SCORE: 26
ADLS_ACUITY_SCORE: 22
ADLS_ACUITY_SCORE: 24
ADLS_ACUITY_SCORE: 24
ADLS_ACUITY_SCORE: 22
ADLS_ACUITY_SCORE: 24
ADLS_ACUITY_SCORE: 22
ADLS_ACUITY_SCORE: 26
ADLS_ACUITY_SCORE: 22
ADLS_ACUITY_SCORE: 24
ADLS_ACUITY_SCORE: 22
ADLS_ACUITY_SCORE: 24
ADLS_ACUITY_SCORE: 24
ADLS_ACUITY_SCORE: 20
ADLS_ACUITY_SCORE: 24
ADLS_ACUITY_SCORE: 26
ADLS_ACUITY_SCORE: 24
ADLS_ACUITY_SCORE: 22
ADLS_ACUITY_SCORE: 24
ADLS_ACUITY_SCORE: 24
ADLS_ACUITY_SCORE: 26
ADLS_ACUITY_SCORE: 24
ADLS_ACUITY_SCORE: 22
ADLS_ACUITY_SCORE: 24
ADLS_ACUITY_SCORE: 22
ADLS_ACUITY_SCORE: 20
ADLS_ACUITY_SCORE: 24
ADLS_ACUITY_SCORE: 24
ADLS_ACUITY_SCORE: 22
ADLS_ACUITY_SCORE: 24
ADLS_ACUITY_SCORE: 24
ADLS_ACUITY_SCORE: 22
ADLS_ACUITY_SCORE: 20
ADLS_ACUITY_SCORE: 24
ADLS_ACUITY_SCORE: 22
ADLS_ACUITY_SCORE: 24
ADLS_ACUITY_SCORE: 26
ADLS_ACUITY_SCORE: 24
ADLS_ACUITY_SCORE: 22
ADLS_ACUITY_SCORE: 12
ADLS_ACUITY_SCORE: 24
ADLS_ACUITY_SCORE: 22
ADLS_ACUITY_SCORE: 24
ADLS_ACUITY_SCORE: 22
ADLS_ACUITY_SCORE: 24
ADLS_ACUITY_SCORE: 22
ADLS_ACUITY_SCORE: 24
ADLS_ACUITY_SCORE: 20
ADLS_ACUITY_SCORE: 24
ADLS_ACUITY_SCORE: 20
ADLS_ACUITY_SCORE: 24
ADLS_ACUITY_SCORE: 24
ADLS_ACUITY_SCORE: 26
ADLS_ACUITY_SCORE: 24
ADLS_ACUITY_SCORE: 26
ADLS_ACUITY_SCORE: 24
ADLS_ACUITY_SCORE: 22
ADLS_ACUITY_SCORE: 24
ADLS_ACUITY_SCORE: 24
ADLS_ACUITY_SCORE: 12
ADLS_ACUITY_SCORE: 26
ADLS_ACUITY_SCORE: 22
ADLS_ACUITY_SCORE: 24
ADLS_ACUITY_SCORE: 20
ADLS_ACUITY_SCORE: 26
ADLS_ACUITY_SCORE: 24
ADLS_ACUITY_SCORE: 26
ADLS_ACUITY_SCORE: 22
ADLS_ACUITY_SCORE: 26
ADLS_ACUITY_SCORE: 24
ADLS_ACUITY_SCORE: 20

## 2022-04-01 PROBLEM — I46.9 CARDIAC ARREST (H): Status: ACTIVE | Noted: 2022-01-01

## 2022-04-01 NOTE — Clinical Note
Arrhythmia Type: polymorphic VT.   Method of Cardioversion: defibrillated.   The arrhythmia was terminated.   Energy shock delivered: 200 joules.   Time shock delivered: 14:23 CDT.   Post cardioversion rhythm: junctional.   No early return to atrial fibrillation (ERAF). No immediate return to atrial fibrillation (IRAF).

## 2022-04-01 NOTE — PHARMACY-VANCOMYCIN DOSING SERVICE
"Pharmacy Vancomycin Initial Note  Date of Service 2022  Patient's  1898  89 year old, adult    Indication: Aspiration Pneumonia    Current estimated CrCl = CrCl cannot be calculated (Unknown ideal weight.).    Creatinine for last 3 days  2022:  4:39 PM Creatinine 1.19 mg/dL    Recent Vancomycin Level(s) for last 3 days  No results found for requested labs within last 72 hours.      Vancomycin IV Administrations (past 72 hours)      No vancomycin orders with administrations in past 72 hours.                Nephrotoxins and other renal medications (From now, onward)    Start     Dose/Rate Route Frequency Ordered Stop    22  vancomycin (VANCOCIN) 1,750 mg in sodium chloride 0.9 % 500 mL intermittent infusion         1,750 mg  over 120 Minutes Intravenous EVERY 24 HOURS 22 1837      22 0000  piperacillin-tazobactam (ZOSYN) 3.375 g vial to attach to  mL bag        Note to Pharmacy: For [facility], [facility] and [facility]: For Zosyn-naive patients, use the \"Zosyn initial dose + extended infusion\" order panel.    3.375 g  over 30 Minutes Intravenous EVERY 6 HOURS 22 1634 22 2359    22 1730  vasopressin 0.2 units/mL in NS (PITRESSIN) standard conc infusion         0.5-4 Units/hr  2.5-20 mL/hr  Intravenous CONTINUOUS 22 1707      22 1730  norepinephrine (LEVOPHED) 16 mg in  mL infusion MAX CONC CENTRAL LINE         0.01-0.6 mcg/kg/min × 88 kg (Dosing Weight)  0.8-49.5 mL/hr  Intravenous CONTINUOUS 22 1709      22 1730  vancomycin (VANCOCIN) 1,750 mg in sodium chloride 0.9 % 500 mL intermittent infusion         1,750 mg  over 120 Minutes Intravenous ONCE 22 1712            Contrast Orders - past 72 hours (72h ago, onward)            None              Plan:  1. Start vancomycin  1750 mg IV q24h.   2. Vancomycin monitoring method: Trough (Method 2 = manual dose calculation)  3. Vancomycin therapeutic monitoring goal: 400-600 mg*h/L  4. Pharmacy " will check vancomycin levels as appropriate in 1-3 Days.    5. Serum creatinine levels will be ordered daily for the first week of therapy and at least twice weekly for subsequent weeks.      Esme Sr, HerbD

## 2022-04-01 NOTE — PROGRESS NOTES
Angie Caruso is a 36  year old adult M with a pmhx sig for drug abuse and colon cancer?? Who was admitted on 4/1/2022 after a reported witnessed arrest (by roommates) while doing drugs (methamphetamine and DMT/halucinagen) this afternoon. Per report the pt had about 5 minutes of bystander cpr followed by EMS CPR on arrival he was in PEA which converted to VF after third epi at about 20 min of cpr then asystole during his resuscitation (NO intermittent ROSC). He was intubated in the field though had poor oxygenation the entire time, (sats less than 60) he received 4 shocks a total of 4 of epi , sodium bicarb , calcium and narcan X3 (this is all  information, scene run information still coming in) during his transport as well. He was brought to the cath lab with the DENI device in place in asystole, his ett was retracted, his initial gas ph 6.7 was pao2 36, his lactate was 17. He was cannulated for va ecmo and an IABP was placed, cath was clean of cad and he remained in a malignant rhythm (PEA and VF for which he was shocked 3 more times and several doses of amio were given) for some time with profound vasoplegia likely due to acidosis while on multiple pressors. After several doses of bicarb he regained an organized rhythm and blood pressure and was sent for a pan ct prior to transfer to the ICU.    Admission conditions: cardiac arrest, anoxic brain injury, hypoxic/hypercarbic respiratory failure, lactic acidosis, hypotension, cardiogenic shock, vasodilatory shock, ventricular fibrillation, PEA, acute heart failure,transaminitis, shock liver, acute renal injury,aspiration pneumonia  I personally examined the patient, reviewed vital signs, medications, laboratory values, imaging studies, and consults from the past 24 hours. The care plan was developed with the fellow/SUSHMA and I agree with the findings and plan out lined below with the following additions or exceptions.   Neuro: sedation with  roseline Versedtitrate down as able RASS goal is -3 while cooled with ECMO. Neuroprotective strategies in place including cooling to33 degrees C, will re-warm at 24hrs by0.5 degrees C an hours, HOB at 30 degrees, permissive hypercapnia with co2 target 40-50, permissive hypernatremia with goal Na <160 avoiding hypotonic fluids, avoiding neurologic vasodilators such as nitroprusside. Cis if needed for shivering in which case a BIS goal of 30-40 is acceptable. cEEG ordered, neurocrit is following and we appreciate their recs. iCT head pending, looks a bit crowded on my evaluation but formal read pending and no acute findings. iCT C/A/P: large amount of gas in the stomach likely due to bagging prior to arrival will place ng an decompress  Pulm: last ab./15 will titrate down his flow and/or ecmo circuit fio2 as well as vent support to manage his hyperoxia, similarly will decrease sweep aggressively to increase his co2 tolerate acidosis to 7.1 if metabolic to keep co2 high, use bicarb as needed and may need crrt to manage acidosis if lactate continues to rise.   CV: NE, Epi, and phenyl, will titrate phenyl first, epi next to minimize contribution to lactate but will continue to monitor for av opening and pulsatility, may need epi inotrope support for pulsatility. IABP 1:1  GI: AST ALT AP Tbili all pending, BM PTA, no br or tf until rewarmed Fair amount of stool in the bowel, large amount of air in the stomach from resuscitation efforts  Renal: Cr: pending, na 151 permissive hypernatremia to 160, rising due to bicarb, k 2.9 will replace to 3 this is likely due to cold diuresis and shifting, ctm closely  UOP: adequate garcia in place expect rise then fall due to renal injury and atn at 24-48 hrs I/O: goal net even will likely be very positive due to volume resuscitation needed to prevent chugging in the setting of vasoplegia, ctm.   ID:Will be cooled for 24 hrs, wbc pending but anticipate stress induced  leukocytosis. Begin empiric abx :vanc and zosyn for 5 days, send mrsa swab and hope to remove vanco to limit risk of additional renal insult. daily cultures while on ecmo  Endo: BG goal <200, range not yet available  hgb a1c sent insulin gtt as needed   Heme: stable; hgb: 12.2, ctm partially due to acute blood loss in procedure plt: pending INR: pending goal < 2 LDH: pending  plt goal >50 pending currently  Prophy: bowel reg as appropriate after rewarmed, PPI, peridex, heparin gtt for DVT prophy  Dispo: will remain in the ICU for continued care reach out to mother in [place] to notify and aunt here in the city as able    I have personally spent a total of  30 minutes providing critical care services excluding procedure, teaching and ECMO management time for Anonymous [place] who has a high probability of life-threatening deterioration due to the conditions listed above which required my direct attention, intervention, and personal management.       Chacho Valle MD  Critical Care Cardiology  [phone] (c)     April 1, 2022

## 2022-04-01 NOTE — PROGRESS NOTES
ECLS Cannulation Note:    Date on: 4/1/2022  Time on: 1410  Cannulating Physician: Marito    Arterial Cannula: 17 Fr. In the RFA      Venous Cannula: 25 Fr. In the RFV      ECMO components include [employer]  Circuit Lot Number: [id number]  Console Serial Number: [id number]  Oxygenator Lot Number: [id number]    Cannulation was performed in Cardiac Cath lab, placement was verified by Flouroscopy, cannula position is good.    Patient's blood type has been sent to lab.    RT Bessy  ECMO Specialist  4/1/2022 5:01 PM

## 2022-04-01 NOTE — PROGRESS NOTES
[facility], Procedure Note          Intra-Aortic Balloon Pump Insertion:       Angie Caruso  MRN# [id number]   April 1, 2022 1510 Indication: Venticular dysfunction           Procedure performed: April 1, 2022   Location: Cardiac Cath lab   Catheter size: 8 Fr       Catheter placed: Left femoral artery   Complications:: None   Assist initiated: 1:1 ratio       Timing adjusted: Adjusted to achieve optimal assist   Verification of position: Fluoroscopy   Comments: None      Recorded by Laverne Mendoza

## 2022-04-01 NOTE — PROGRESS NOTES
ECMO Shift Summary:      ECMO Equipment:  Circuit Lot Number: [id number]  Console Serial Number: [id number]  Oxygenator Lot Number: [id number]       Patient remains on VA ECMO, all equipment is functioning and alarms are appropriately set. RPM's 3530 with flow range 3.8-3.99 L/min. Sweep gas is at 4 LPM and FiO2 40%. Circuit remains free of air, clot and fibrin. Cannulas are secure with no bleeding from site. Extremities are cold. Suctioned ETT for moderate amount of milky thick secretions.    Significant Shift Events: Started on VAECMO. Transported to CT and CVICU.    Vent settings:  Vent Mode: CMV/AC  (Continuous Mandatory Ventilation/ Assist Control)  FiO2 (%): 40 %  Resp Rate (Set): 18 breaths/min  Tidal Volume (Set, mL): 420 mL  PEEP (cm H2O): 5 cmH2O  .    Heparin is not running yet due to high ACT, ACT range 224-250.    Urine output is as charted, blood loss was minimal. Product given included none.       Intake/Output Summary (Last 24 hours) at 4/1/2022 1818  Last data filed at 4/1/2022 1800  Gross per 24 hour   Intake 24 ml   Output --   Net 24 ml       ECHO:  No results found for this or any previous visit.  No results found for this or any previous visit.      CXR:  Recent Results (from the past 24 hour(s))   Cardiac Catheterization    Narrative    ECMO cannulation and initiation while on Bry.  IABP placement in the LCFA.  Right arterial line placement.  Normal Coronaries   CT Head w/o Contrast    Narrative    CT HEAD W/O CONTRAST 4/1/2022 4:26 PM    History: Status post cardiac arrest     Comparison: None    Technique: Using multidetector thin collimation helical acquisition  technique, axial, coronal and sagittal CT images from the skull base  to the vertex were obtained without intravenous contrast.   (topogram) image(s) also obtained and reviewed.    Findings: There is no intracranial hemorrhage, mass effect, or midline  shift. Gray/white matter differentiation in both cerebral  hemispheres  is preserved. Ventricles are proportionate to the cerebral sulci. The  basal cisterns are clear. Nonspecific fluid containing foci of air in  the nasal cavity. Minimal mucosal thickening of the maxillary sinuses.  Remainder of paranasal sinuses and mastoid air cells are clear. Orbits  are unremarkable.    No acute calvarial fracture. Persistent metopic suture.      Impression    Impression:  No acute intracranial pathology.    I have personally reviewed the examination and initial interpretation  and I agree with the findings.    PUJA TAY MD         SYSTEM ID:  [id number]   CT Chest Abdomen Pelvis w/o Contrast    Narrative    EXAMINATION: CT CHEST ABDOMEN PELVIS W/O CONTRAST, 4/1/2022 4:26 PM    INDICATION: Status post cardiac arrest    COMPARISON STUDY: None available    TECHNIQUE: CT scan of the chest, abdomen and pelvis was performed on  multidetector CT scanner using volumetric acquisition technique and  images were reconstructed in multiple planes with variable thickness  and reviewed on dedicated workstations.     CT scan radiation dose is optimized to minimum requisite dose using  automated dose modulation techniques.    FINDINGS:  Lines and tubes: Endotracheal tube is 1.4 cm above the lizabeth. Right  inferior approach ECMO cannula tip in the right brachiocephalic vein.  Intra-aortic balloon pump superior marker at the proximal descending  thoracic aorta. Right femoral arterial line terminates in the distal  aorta. Left femoral central venous catheter terminates in the right  atrium. Gastric tube terminates within the stomach.    Chest:   Mediastinum: Cardiac size is within normal limits. No pericardial  effusion Normal caliber of the aorta. No pathologically enlarged  hilar, mediastinal or axillary lymphadenopathy. 11 mm right axillary  lymph node, likely reactive. Small amount of hyperattenuating material  in the anterior mediastinum/retrosternal region.    Pleura: No pleural effusion or  thickening.    Lungs: Multifocal groundglass opacities predominantly in the upper  lung fields. There are superimposed dependent consolidative opacities  in the left upper lobe along the major fissure and left lower lobe.  Scattered sub-6 mm pulmonary nodules. For example adjacent pulmonary  nodules, measuring up to 4 mm in the left lower lobe (series 4 image  111 and 115). Endobronchial mucous plugging/debris in the lower lobes,  left greater than right, and lingula.     Abdomen and Pelvis:  Liver: No mass. No intrahepatic biliary ductal dilation.    Biliary System: Normal gallbladder. No extrahepatic biliary ductal  dilation.    Pancreas: Partially obscured by beam hardening defect. No mass or  pancreatic ductal dilation. Edematous pancreatic parenchyma with  peripancreatic fat stranding.    Adrenal glands: No mass or nodules    Spleen: Normal.    Kidneys: No obstructing calculus or hydronephrosis.  No suspicious  renal mass allowing for noncontrast technique.    Gastrointestinal tract: Prominent air and fluid filled stomach. Normal  appendix. Diffusely prominent predominantly fluid-filled small bowel  with a few air-fluid levels but no focal transition point.  Moderate  rectal stool burden.    Mesentery/peritoneum/retroperitoneum: No mass. No free fluid or air.    Lymph nodes: No significant lymphadenopathy.    Vasculature: No infrarenal aneurysm.    Pelvis: Urinary bladder is normal.  Lott catheter in place    Osseous structures: Nondisplaced midsternal fracture. Mild chronic  superior endplate compression deformity of T1. Nondisplaced anterior  right fourth rib fracture.      Soft tissues: Within normal limits.      Impression    IMPRESSION:   1. Endotracheal tube tip in the low thoracic trachea. Venous ECMO  cannula terminates in the right brachiocephalic vein. IABP superior  marker at the level of the proximal descending thoracic aorta. Other  support devices detailed above.  2. Multifocal groundglass  opacities with superimposed consolidative  opacities in the left lower and upper lobes. May represent aspiration  vs. infection, particularly given endobronchial debris/mucus plugging  in the left greater than right lower lobes and lingula  3. Nondisplaced midsternal fracture with a small amount of  retrosternal hemorrhage. Nondisplaced anterior right fourth rib  fracture. No pneumothorax.  4. Edematous pancreas with peripancreatic fat stranding raises concern  for acute residual edematous pancreatitis.  5. Diffusely prominent and predominantly fluid-filled small bowel with  a few air-fluid levels but no focal transition point, likely  reflecting some degree of adynamic ileus.  6. Gastric distention with a gastric tube in place.    I have personally reviewed the examination and initial interpretation  and I agree with the findings.    EPI RENAE,          SYSTEM ID:  [id number]   XR Chest Port 1 View    Impression    RESIDENT PRELIMINARY INTERPRETATION  IMPRESSION:  1.Endotracheal tube tip projects 1.9 cm below the lizabeth. Inferior  approach ECMO cannula projects over the right brachiocephalic vein.  2. Esophageal probe projects over the larynx. Recommend advancing.   3. Perihilar and hazy patchy opacities in the left upper lung        Labs:  Recent Labs   Lab 04/01/22  1643 04/01/22  1638 04/01/22  1525 04/01/22  1445 04/01/22  1428   PH 7.28*  --  7.37 7.32* 7.06*   PCO2 29*  --  25* 46* 51*   PO2 307*  --  551* 182* 378*   HCO3 13*  --  15* 24 15*   O2PER 60 60  60 40.0 100.0 100.0       Lab Results   Component Value Date    HGB 12.9 04/01/2022     04/01/2022    INR 1.76 (H) 04/01/2022    PTT >240 (HH) 04/01/2022    DD >20.00 (HH) 04/01/2022         Plan is to continue to support.      Funmilayo Abernathy, RT  ECMO Specialist  4/1/2022 6:18 PM

## 2022-04-01 NOTE — PROGRESS NOTES
ECMO Attending Progress Note  4/1/2022    Anonymous Ari Caruso is a 89 year old adult who was cannulated for ECMO 4/1/2022 due to refreactory cardiac arrest in the setting of drug overdose (PEA to VT)    Cannulation Site:  17 Fr in the R femoral artery  25 Fr in the R femoral vein    Interval events: Cannulated    Physical Exam:     No intake or output data in the 24 hours ending 04/01/22 1631 No data recorded     Labs:  Recent Labs   Lab 04/01/22  1525 04/01/22  1445 04/01/22  1428 04/01/22  1418   PH 7.37 7.32* 7.06* 7.10*   PCO2 25* 46* 51* 71*   PO2 551* 182* 378* 483*   HCO3 15* 24 15* 22   O2PER 40.0 100.0 100.0 100.0      Recent Labs   Lab 04/01/22  1525 04/01/22  1445 04/01/22  1428 04/01/22  1418   HGB 12.2 11.2* 11.6* 12.1     No results found for: CR           ECMO Issues including assessments and plan on DOS 4/1/2022:  Neuro: Sedated for mechanical ventilation and ECMO.  No acute distress.  NIRS being placed RASS goal: -2  CV: Cardiogenic shock.  Hemodynamically stable on NE, Epi and phenyl  Pulm: Keep vent settings at rest settings as above.  FEN/Renal:Electrolytes stable w/ replacement protocols in place will monitor cr and uop, expect cold diuresis with atn due to hypotension, lactic acidosis    Heme: ACT goal:180-200 will modify depending on ct findings, Hemoglobin 12.2.  Minimal oozing around the ECMO cannulas.  ID: Receiving empiric antibiotics mrsa swab to remove vanco  Cannulae: Position is acceptable on exam and the available imaging.  Distal perfusion cannula is in place and patent.  Extremities are well-perfused.     I have personally reviewed the ECMO flows, oxygenation and CO2 clearance, anticoagulation, and cannula position.  I have also personally assessed the patient's systemic response with hemodynamics, oxygenation, ventilation, and bleeding.       The patient requires continued ECMO support and management in the ICU.  I have discussed patient care and treatment plan with the  primary team.      Chacho Valle MD  Critical Care Cardiology  [phone]    April 1, 2022

## 2022-04-01 NOTE — CONSULTS
"[facility]: [facility]  NeuroCritical Care Consult    Patient Name:  Angie Caruso  MRN:  [id number]    :  1898  Date of Service:  2022  Primary care provider:  No primary care provider on file.      Reason for Consult: Asked by CSI to see Angie Caruso for Neuro mgmt post cardiac arrest.     History of Present Illness:   Anonymous [place] is a 89 year old adult admitted on 2022 with a unknown PMH who presents for witnessed cardiac arrest while doing Meth/DMT this afternoon. Pt was down 5 minutes with bystander CPR prior to ems arrival, where he was found to be in PEA. PEA converted to VF->4 epi->4shocks->intubated in field. Taken to cath lab upon arrival to EMS and cannulated for VA ECMO.     Initial CT without acute intracranial pathology. No signs of anoxic injury.         ROS: A 10-point ROS was performed as per HPI.    NEURO RECS  - Recommend vEEG while on sedation  - Avoid hypotonic solutions as they may worsen cerebral edema  - Avoid \"nitroprusside\",\"nitroglycerin\" as they may also worsen cerbral edema.  - Advise slow correction of any high Sodiums if needed  - When safe to do so, limitation of CNS acting medications will permit a more accurate neurological assessment  - We will continue to follow and monitor their EEG and neurologic exam, please call #[phone] with any questions    Thank you for this consultation.    PMH:  No past medical history on file.  No past surgical history on file.    Allergies:  Not on File    Medications:      Current Facility-Administered Medications:      artificial tears ophthalmic ointment, , Both Eyes, Q8H, Silvana Ca, APRN CNP     calcium chloride in  mL intermittent infusion 1 g, 1 g, Intravenous, Q6H PRN, Silvana Ca, APRN CNP     EPINEPHrine (ADRENALIN) 5 mg in sodium chloride 0.9 % 250 mL infusion CENTRAL, 0.01-0.3 mcg/kg/min (Dosing Weight), Intravenous, Continuous, Marito, " MD Papito     fentaNYL (SUBLIMAZE) 50 mcg/mL bolus from infusion pump 50 mcg, 50 mcg, Intravenous, Q30 Min PRN, Silvana Ca APRN CNP     fentaNYL (SUBLIMAZE) infusion,  mcg/hr, Intravenous, Continuous, Silvana Ca APRN CNP     heparin 2 unit/mL in 0.9% NaCl (for REPERFUSION CATHETER), 3 mL/hr, Intravenous, Continuous, Silvana Ca APRN CNP     heparin ANTICOAGULANT bolus dose from infusion pump 440-880 Units, 5-10 Units/kg (Dosing Weight), Other, Q30 Min PRN, Silvana Ca APRN CNP     heparin infusion 25,000 units in D5W 250 mL ANTICOAGULANT, 10-50 Units/kg/hr (Dosing Weight), Other, Continuous, Silvana Ca APRN CNP     lidocaine (LMX4) cream, , Topical, Q1H PRN, Silvana Ca APRN CNP     lidocaine 1 % 0.1-1 mL, 0.1-1 mL, Other, Q1H PRN, Silvana Ca APRN CNP     magnesium sulfate 2 g in water intermittent infusion, 2 g, Intravenous, Daily PRN, Silvana Ca APRN CNP     magnesium sulfate 4 g in 100 mL sterile water (premade), 4 g, Intravenous, Q4H PRN, Silvana Ca APRN CNP     midazolam (VERSED) drip - ADULT 100 mg/100 mL in NS (pre-mix), 1-8 mg/hr, Intravenous, Continuous, Silvana Ca APRN CNP     midazolam (VERSED) injection 1-3 mg, 1-3 mg, Intravenous, Q1H PRN, Silvana Ca APRN CNP     [START ON 4/2/2022] pantoprazole (PROTONIX) IV push injection 40 mg, 40 mg, Intravenous, Daily, Silvana Ca APRN CNP     piperacillin-tazobactam (ZOSYN) 3.375 g vial to attach to  mL bag, 3.375 g, Intravenous, Q6H, Silvana Ca APRN CNP     piperacillin-tazobactam (ZOSYN) 3.375 g vial to attach to  mL bag, 3.375 g, Intravenous, Once, Papito Devi MD     potassium chloride 20 mEq in 100 mL NON-STANDARD CONC intermittent infusion, 20 mEq, Intravenous, Q1H PRN, Silvana Ca, VELIA CNP     sodium chloride (PF) 0.9% PF flush 3 mL, 3 mL, Intracatheter, q1 min prn, Silvana Ca APRN CNP     sodium phosphate 10 mmol in D5W 250 mL intermittent  infusion, 10 mmol, Intravenous, Daily PRN, Silvana Ca, VELIA CNP     sodium phosphate 15 mmol in D5W intermittent infusion, 15 mmol, Intravenous, Daily PRN, Silvana Ca, VELIA CNP     sodium phosphate 20 mmol in D5W 250 mL intermittent infusion, 20 mmol, Intravenous, Q6H PRN, Silvana Ca, VELIA CNP     sodium phosphate 25 mmol in D5W 250 mL intermittent infusion, 25 mmol, Intravenous, Q8H PRN, Silvana Ca, VELIA CNP    Social History:  Social History     Tobacco Use     Smoking status: Not on file     Smokeless tobacco: Not on file   Substance Use Topics     Alcohol use: Not on file       Family History:    No family history on file.    Physical Examination:   There were no vitals taken for this visit.  General: Adult male patient, lying in bed, NAD  HEENT: ETT  Cardiac: tele  Pulm: vent/ecmo  Abdomen: Soft, bowel sounds present  Extremities: cool, no edema  Skin: No rash or lesion     Neuro:  Pt is cooled, on no sedation. Pt did not follow commands or open eyes. Pupils reactive and equal on pupillometry. 0/5 in 4/4 to noxious stim. -cough/gag    I have personally reviewed all labs and imaging for this patient.      Patient discussed with attending neurologist, Dr. Carlton.     Juan Reed DNP  Ascom: *[phone]

## 2022-04-01 NOTE — Clinical Note
Arrhythmia Type: polymorphic VT.   Method of Cardioversion: defibrillated.   The arrhythmia was terminated.   Energy shock delivered: 200 joules.   Time shock delivered: 14:43 CDT.   Post cardioversion rhythm: junctional.

## 2022-04-01 NOTE — PROGRESS NOTES
Patient name and NOK received from ER. Patient name is TIERNEY CARVALHO ( 12/10/1984??) - added to aliases.     Patients mother's name is Dayana, contact number [phone]. Mother updated via phone by [email or web address] SUSHMA.

## 2022-04-01 NOTE — H&P
[facility]  Cardiac ICU H&P  April 1, 2022          H&P:   Anonymous Yellow [place] is a 36 year old adult male who was admitted on 4/1/2022. Patient was brought by EMS who gave the following report. EMS was called at 12:54 pm to the patient's residence where he had suffered a presumed cardiac arrest after taking methamphetamine and DMT. Per EMS, CPR was administered by room mates. Unclear how long down time prior TO room mates administered CPR or to EMS call. On arrival, EMS noted patient to be in PEA arrest with CPR continued and patient was given total 4 doses of Epi, once of bicarb, one of calcium and 3 doses of Narcan (one ?inhaled, 2 IV). He was also shocked 4 times by EMS for periods of VFib electrical activity enroute to the hospital. Of note, ROSC was never achieved by EMS. On presentation to the Cath lab, patient was in asystole with DENI delivered CPRS continued. Initial blood gas showed a pH of 6.7, O2 of 36. He was cannulated for VA ECMO at 2:08 pm. Post cannulation patient was alternating between PEA and VF with additional 3 total 200 J shocks delivered. Post cannulation coronary angiogram showed clean coronary arteries. Epi, Norepi, Vaso and Phenylephrine was started at high doses and patient was given multiple boluses of Amiodarone. Additionally, an intra-aortic balloon pump and thermogard was placed with patient transported to CT for brain imaging prior to arriving to the ICU for continued management.      Witnessed arest (y/n): yes  Home or public location (y/n): home  Bystander CPR (y/n): yes  Time of 911 call: 12.54 PM  Initial rhythm: PEA  Did they have intermittent ROSC (y/n): no  # of shocks: by EMS: 4,by cath lab 3  Epi:   4  Amps  Amio: none in the field, 2 boluses in the cath lab   Bicarb:  1 amps  EtCO2 en route: unknwon  O2 sat en route:unknown, O2 on initial ABG on presentation was 36    Initial rhythm in the cath lab: PEA  First ABG: pH 6.7 CO2: 116  O2:  36  ECMO cannula size: 17 Fr to right femoral artery ? Fr to right femoral vein  Meds given in the cath lab: Epi, NorEpi, Vaso, Phenyl, Amio, 400 meq of sodium Bicarb  Initial PA catheter numbers: N/A           Past Medical History:   No past medical history on file.         Family History:   No family history on file.         Social History:     Social History     Socioeconomic History     Marital status: Not on file     Spouse name: Not on file     Number of children: Not on file     Years of education: Not on file     Highest education level: Not on file   Occupational History     Not on file   Tobacco Use     Smoking status: Not on file     Smokeless tobacco: Not on file   Substance and Sexual Activity     Alcohol use: Not on file     Drug use: Not on file     Sexual activity: Not on file   Other Topics Concern     Not on file   Social History Narrative     Not on file     Social Determinants of Health     Financial Resource Strain: Not on file   Food Insecurity: Not on file   Transportation Needs: Not on file   Physical Activity: Not on file   Stress: Not on file   Social Connections: Not on file   Intimate Partner Violence: Not on file   Housing Stability: Not on file            Medications:   I have reviewed this patient's current medications  No medications prior to admission.            Review of Systems:   Unable to obtain ROS due to pt's obtunded status            Physical Exam:   Vital signs:                         There is no height or weight on file to calculate BMI.    GENERAL: intubated and sedated  HEENT: ET tube in place  CV: S1/S2 heard without murmur   RESPIRATORY: distant breath sounds  GI: obese, soft, no bowel sounds on asucultation   EXTREMITIES: No peripheral edema. 2+ bilateral pedal pulses.   NEUROLOGIC: not oriented to place or time, does not follow commands  MUSCULOSKELETAL: No joint swelling or tenderness.   SKIN: No jaundice. No acute rashes or lesions.             Data:   Labs  pending    No results found for this or any previous visit (from the past 24 hour(s)).           Assessment and Plan:   89 year old adult who was admitted on 4/1/2022,     Neurology: # Concern for Anoxic Brain injury  # Possible Encephalopathy  Intubated, sedated, paralyzed.   Cooled to 34 degrees.  --continue versed, fentanyl, may use paralytic as needed to maintain RASS goal -4 to -5  - If paralyzing, will add on BIS monitoring with goal of 40 - 60   Cardiovascular / Hemodynamics: # PEA arrest complicated by VF  # Cardiogenic shock with profound vasoplegia and hypotension  # Drug induced cardiac arrest  No CAD on coronary angiogram.  Peripheral V-A ECMO inserted for cardiopulmonary support. IABP insert for maximal support  TTE: pending  EKG: pending,   --wean pressors/inotropes as able  --echo tomorrow with possible ECMO turndown  --continue ASA 81mg and ticagrelor 90mg BID  --hold temp at   --ACT goal 180-200  --hold lipitor for now given likely hepatic injury during arrest  --hold ACE/ARB for now given likely reduced renal fxn after arrest  --holding beta blocker given shock      Pulmonary: # Acute Hypoxic Respiratory failure  ETT in place at ~2 cm above the lizabeth.    Vent setting: CMV 40% FiO2/420 TV/5 PEEP/18 RR  CXR: Lines in stable position.   --wean vent as able  --daily CXR  --Q2h ABGs for now  --consider scheduled duonebs if signs of lung dz, currently PRN     GI and Nutrition: # Shock liver secondary to cardiac arrest  # Severe lactic acidosis  No known medical hx.   --monitor BID LFTs  --NPO while cooled  -- nutrition consult pending feeding tube placement once he is warmed   --bowel regimen - on hold for now due to hypothermia  --GI Prophylaxis: PPI    Renal, Fluid and Electrolytes: Cr 1.19. UOP pending.  --monitor urine output  --maintain K>3 and Mg>2    Infectious Disease: # Possible aspiration penumonia  No signs of infection. Leukocytosis c/w arrest. Blood cultures collected.   --vancomycin/zosyn  x5 days for ECMO  --daily blood cultures  --monitor for signs of infection given cooling, lines, and leukocytosis  --may stop vancomycin if mrsa nares negative   Hematology and Oncology: # Possible Coagulopathy  Received heparin while in the cath lab  Will continue heparin for ECMO to maintain ACT goal. On admission, ACT was 250 so heparin held with close monitoring.    --cryo PRN fibrinogen < 200; FFP for INR >2  --Transfuse for Hgb<10  --heparin gtt for ECMO with ACT goal 160-180  --US LE w/ arterial duplex per ECMO protocol   --DVT PPX: Heparin as above   Endocrinology: # Hypoglycemia  Low BG on presentation to 66 with uptrend to 111, will monitor closely  No known medical history.  --insulin gtt to start if needed  --f/u HgbA1c    Lines:   PA catheter April 1, 2022  R femoral arterial and venous ECMO cannulae April 1, 2022  L femoral arterial line April 1, 2022  R radial arterial line April 1, 2022  ETT April 1, 2022  Lott catheter April 1, 2022  OG tube April 1, 2022  Restraint: needed    Current lines are required for patient management       Family update by me today: Yes   Mother, Dayana was updated by me. She lived in [place]. Contact number: [phone].   Per mother, patient has an aunt, Teri Jiménez,  who lives in [place], contact number: [phone]    Code Status: Full confirmed with mother on the phone.       The Pt was discussed and evaluated with Dr. Jerod MD, attending physician, who agrees with the assessment and plan above.     Cherri Vallejo MD, PhD  Cardiology Fellow  Click to text page [phone]  4/1/2022

## 2022-04-01 NOTE — Clinical Note
Arrhythmia Type: polymorphic VT.   Method of Cardioversion: defibrillated.   The arrhythmia was terminated.   Energy shock delivered: 200 joules.   Time shock delivered: 14:33 CDT.   Post cardioversion rhythm: junctional.

## 2022-04-02 NOTE — PROGRESS NOTES
EEG CLINICAL NEUROPHYSIOLOGY PRELIMINARY REPORT    First two hours of video EEG reviewed.  Hypothermic.  Fentanyl 200 mcg/h.  Midazolam 8 mg/h.  Irregular polymorphic low amplitude (5 to 10 V) delta.  Brief periods of attenuation in which no clear electrocerebral activity is noted.  EEG is unreactive.  No periodic discharges.  No electrographic seizures.    Severely abnormal.  Findings consistent with severe diffuse encephalopathy.  Findings may in part be related to hypothermia and anesthetic drips employed.  Thus far, no periodic discharges or seizures.  It should be noted that both hypothermia and midazolam have potent anticonvulsant effects so seizures could potentially emerge as these treatments are tapered.  We will continue video EEG monitoring.  Full report to follow.    Jean Marie Mota MD  Contact information for physicians covering Epilepsy and EEG is available on [facility].  Click search, enter neurology adult/[facility] in group name box, click on neurology adult/[facility], then click Staff Epilepsy and EEG.

## 2022-04-02 NOTE — PROGRESS NOTES
ECMO Shift Summary:      ECMO Equipment:  Console serial number: [id number]  Circuit Lot number: [id number]  Oxygenator Lot number: [id number]      Patient remains on VA ECMO, all equipment is functioning and alarms are appropriately set. RPM's 3800 with flow range 3.84-4.45 L/min. Sweep gas is at 4 LPM and FiO2 50%. Circuit remains free of air and clot, fibrin at connectors. Cannulas are secure with no bleeding from site. Extremities are slightly cool, but well perused. CSI MD notified of pupil change by RN.    Significant Shift Events: Some chugging this am 500 Lactated ringers given with resolution. Map became labile, increased ECMO flow and pressors. Started rewarming at 1700.    Vent settings:  Vent Mode: CMV/AC  (Continuous Mandatory Ventilation/ Assist Control)  FiO2 (%): 40 %  Resp Rate (Set): 18 breaths/min  Tidal Volume (Set, mL): 420 mL  PEEP (cm H2O): 5 cmH2O  Resp: 18  .    Heparin is running at 700 unit(s)/hr. ACT range 177-224.    Urine output is as documented, blood loss was none. Product given included 500 ml Lactated Ringers.       Intake/Output Summary (Last 24 hours) at 4/2/2022 1723  Last data filed at 4/2/2022 1700  Gross per 24 hour   Intake 5237.23 ml   Output 626 ml   Net 4611.23 ml       ECHO:  No results found for this or any previous visit.  No results found for this or any previous visit.      CXR:      Plan is continue rewarming and remain on VA ECMO support.      Megan Allen, RT  ECMO Specialist  4/2/2022 5:23 PM

## 2022-04-02 NOTE — PROGRESS NOTES
ECMO Shift Summary: [id number]    ECMO Equipment:  Console serial number: [id number]  Circuit Lot number: [id number]  Oxygenator Lot number: [id number]    Patient remains on VA ECMO, all equipment is functioning and alarms are appropriately set. RPM's 3500 with flow range ~3.8-4.0 L/min. Sweep gas is at 4 LPM and FiO2 50%. Circuit remains free of air. Small amount of fibrin forming at connector sites and small dark clot forming at oxygenator outlet connection. Cannulas are secure with no bleeding from site. Extremities are warm/perfused. Suctioned ETT for scant secretions.    Significant Shift Events: Circuit started chugging around 0400 today, requiring 500 mL LR fluid bolus. Pressor requirements slowly decreased overnight and lactate very slowly dropping too. Sweep was titrated to target Stella of 40-50 and pH>7.1. Heparin started at 0600.  Vent settings:  Vent Mode: CMV/AC  (Continuous Mandatory Ventilation/ Assist Control)  FiO2 (%): 40 %  Resp Rate (Set): 18 breaths/min  Tidal Volume (Set, mL): 420 mL  PEEP (cm H2O): 5 cmH2O  Resp: 18    Anticoagulation and Volume Status:  Heparin is running at 900 u/hr (~10 unit(s)/kg/hr), ACT range 182-211 with goal of 180-200.    Urine output is low, blood loss was nil. No blood products given during shift. 500 mL of LR given.     Intake/Output Summary (Last 24 hours) at 4/2/2022 0623  Last data filed at 4/2/2022 0600  Gross per 24 hour   Intake 3632.83 ml   Output 565 ml   Net 3067.83 ml       Imaging Studies:  Recent Results (from the past 24 hour(s))   Cardiac Catheterization    Narrative    ECMO cannulation and initiation while on Bry.  IABP placement in the LCFA.  Right arterial line placement.  Normal Coronaries   CT Head w/o Contrast    Narrative    CT HEAD W/O CONTRAST 4/1/2022 4:26 PM    History: Status post cardiac arrest     Comparison: None    Technique: Using multidetector thin collimation helical acquisition  technique, axial, coronal and sagittal CT images from  the skull base  to the vertex were obtained without intravenous contrast.   (topogram) image(s) also obtained and reviewed.    Findings: There is no intracranial hemorrhage, mass effect, or midline  shift. Gray/white matter differentiation in both cerebral hemispheres  is preserved. Ventricles are proportionate to the cerebral sulci. The  basal cisterns are clear. Nonspecific fluid containing foci of air in  the nasal cavity. Minimal mucosal thickening of the maxillary sinuses.  Remainder of paranasal sinuses and mastoid air cells are clear. Orbits  are unremarkable.    No acute calvarial fracture. Persistent metopic suture.      Impression    Impression:  No acute intracranial pathology.    I have personally reviewed the examination and initial interpretation  and I agree with the findings.    PUJA TAY MD         SYSTEM ID:  [id number]   CT Chest Abdomen Pelvis w/o Contrast    Narrative    EXAMINATION: CT CHEST ABDOMEN PELVIS W/O CONTRAST, 4/1/2022 4:26 PM    INDICATION: Status post cardiac arrest    COMPARISON STUDY: None available    TECHNIQUE: CT scan of the chest, abdomen and pelvis was performed on  multidetector CT scanner using volumetric acquisition technique and  images were reconstructed in multiple planes with variable thickness  and reviewed on dedicated workstations.     CT scan radiation dose is optimized to minimum requisite dose using  automated dose modulation techniques.    FINDINGS:  Lines and tubes: Endotracheal tube is 1.4 cm above the lizabeth. Right  inferior approach ECMO cannula tip in the right brachiocephalic vein.  Intra-aortic balloon pump superior marker at the proximal descending  thoracic aorta. Right femoral arterial line terminates in the distal  aorta. Left femoral central venous catheter terminates in the right  atrium. Gastric tube terminates within the stomach.    Chest:   Mediastinum: Cardiac size is within normal limits. No pericardial  effusion Normal caliber of the  aorta. No pathologically enlarged  hilar, mediastinal or axillary lymphadenopathy. 11 mm right axillary  lymph node, likely reactive. Small amount of hyperattenuating material  in the anterior mediastinum/retrosternal region.    Pleura: No pleural effusion or thickening.    Lungs: Multifocal groundglass opacities predominantly in the upper  lung fields. There are superimposed dependent consolidative opacities  in the left upper lobe along the major fissure and left lower lobe.  Scattered sub-6 mm pulmonary nodules. For example adjacent pulmonary  nodules, measuring up to 4 mm in the left lower lobe (series 4 image  111 and 115). Endobronchial mucous plugging/debris in the lower lobes,  left greater than right, and lingula.     Abdomen and Pelvis:  Liver: No mass. No intrahepatic biliary ductal dilation.    Biliary System: Normal gallbladder. No extrahepatic biliary ductal  dilation.    Pancreas: Partially obscured by beam hardening defect. No mass or  pancreatic ductal dilation. Edematous pancreatic parenchyma with  peripancreatic fat stranding.    Adrenal glands: No mass or nodules    Spleen: Normal.    Kidneys: No obstructing calculus or hydronephrosis.  No suspicious  renal mass allowing for noncontrast technique.    Gastrointestinal tract: Prominent air and fluid filled stomach. Normal  appendix. Diffusely prominent predominantly fluid-filled small bowel  with a few air-fluid levels but no focal transition point.  Moderate  rectal stool burden.    Mesentery/peritoneum/retroperitoneum: No mass. No free fluid or air.    Lymph nodes: No significant lymphadenopathy.    Vasculature: No infrarenal aneurysm.    Pelvis: Urinary bladder is normal.  Lott catheter in place    Osseous structures: Nondisplaced midsternal fracture. Mild chronic  superior endplate compression deformity of T1. Nondisplaced anterior  right fourth rib fracture.      Soft tissues: Within normal limits.      Impression    IMPRESSION:   1.  Endotracheal tube tip in the low thoracic trachea. Venous ECMO  cannula terminates in the right brachiocephalic vein. IABP superior  marker at the level of the proximal descending thoracic aorta. Other  support devices detailed above.  2. Multifocal groundglass opacities with superimposed consolidative  opacities in the left lower and upper lobes. May represent aspiration  vs. infection, particularly given endobronchial debris/mucus plugging  in the left greater than right lower lobes and lingula  3. Nondisplaced midsternal fracture with a small amount of  retrosternal hemorrhage. Nondisplaced anterior right fourth rib  fracture. No pneumothorax.  4. Edematous pancreas with peripancreatic fat stranding raises concern  for acute residual edematous pancreatitis.  5. Diffusely prominent and predominantly fluid-filled small bowel with  a few air-fluid levels but no focal transition point, likely  reflecting some degree of adynamic ileus.  6. Gastric distention with a gastric tube in place.    I have personally reviewed the examination and initial interpretation  and I agree with the findings.    EPI RENAE,          SYSTEM ID:  [id number]   XR Chest Port 1 View    Narrative    EXAM: XR CHEST PORT 1 VIEW  4/1/2022 5:55 PM     HISTORY:  Check endotracheal tube placement and ECLS cannula  placement. DO NOT log-roll patient.  Place film under patient using  patient safety handling process.       COMPARISON:  Same-day CT chest    FINDINGS:     Portable upright view of the chest. Endotracheal tube tip projects 2.6  cm above the lizabeth. Inferior approach ECMO cannula projects over the  right brachiocephalic vein. Esophageal temperature probe tip  terminates over the hypopharynx. Intra-aortic balloon pump radiodense  superior marker projects over the level of the proximal descending  thoracic aorta. Enteric tube tip courses below the diaphragm and  beyond the field of view.     Trachea is midline. Cardiomediastinal  silhouette is within normal  limits. Prominent pulmonary vasculature. Low lung volumes. Perihilar  and hazy patchy opacities in the left upper lung field. No acute  osseous abnormality. Visualized upper abdomen is unremarkable.        Impression    IMPRESSION:  1. Endotracheal tube tip projects 2.6 cm above the lizabeth. Inferior  approach ECMO cannula projects over the right brachiocephalic vein.  2. Esophageal temperature probe tip projects over the hypopharynx.   3. Perihilar and hazy patchy opacities in the left upper lung, better  seen on comparison CT.     I have personally reviewed the examination and initial interpretation  and I agree with the findings.    EPI RENAE DO         SYSTEM ID:  [id number]   XR Abdomen Port 1 View    Narrative    EXAMINATION:  XR ABDOMEN PORT 1 VIEWS 4/1/2022 5:55 PM.    COMPARISON: Same day CT    HISTORY:  OG tube placement    FINDINGS: Frontal view of the upper abdomen/lower chest. Stable  position of IABP and inferior approach ECMO cannula and central venous  catheter. Gastric tube with tip and sidehole projecting over the  stomach. Stomach is distended with air. Gaseous distended small bowel  partially visualized. Lungs are clear.      Impression    IMPRESSION: Gastric tube tip and sidehole project over the stomach,  which is dilated with air.    I have personally reviewed the examination and initial interpretation  and I agree with the findings.    EPI RENAE DO         SYSTEM ID:  [id number]   XR Chest Port 1 View    Impression    RESIDENT PRELIMINARY INTERPRETATION  Impression:   1. Endotracheal tube and ECMO cannula as detailed.  2. No acute cardiopulmonary abnormalities.       Labs:  Recent Labs   Lab 04/02/22  0554 04/02/22  0356 04/02/22  0201 04/01/22  2355   PH 7.30* 7.28* 7.18* 7.13*   PCO2 41 40 44 52*   PO2 99 92 144* 117*   HCO3 20* 19* 16* 17*   O2PER 40 50  40  40 40 40       Lab Results   Component Value Date    HGB 14.2 04/02/2022    PHGB 40 (H)  04/02/2022     04/02/2022    FIBR 199 04/02/2022    INR 1.65 (H) 04/02/2022    PTT 53 (H) 04/02/2022    DD >20.00 (HH) 04/02/2022       Plan:  Plan is to continue full VA ECMO support. Will obtain EEG today.     Kassidy Lisa, RT  ECMO Specialist  4/2/2022   6:23 AM

## 2022-04-02 NOTE — PHARMACY-VANCOMYCIN DOSING SERVICE
Pharmacy Empiric Dose Change Per Policy - vancomycin  Original Dose Ordered: 1750 mg IV q24 hours  Dose Changed To: intermittent dosing  This dose change was based on the pharmacist's assessment of this patient's age, weight, concurrent drug therapy, treatment goals, whether patient's creatinine clearance adequately indicates renal function (factoring in age, muscle mass, fluid and clinical status), and, if applicable, prior pharmacokinetic data.    Will continue to follow and modify dosage according to levels, organ function and clinical condition    Steve Lopez, PharmD, BCCCP

## 2022-04-02 NOTE — PLAN OF CARE
Shift Summary [id number]    Major Shift Events:  Pt remains intubated/sedated on versed/fent. Upon arrival to shift pt sedation increased to try to reach BIS <50 for paralytic due to shivering. Once sedation maxed, pt stopped shivering, paralytic held. RASS -5, PERRLA, TTM continues to 34C. ECMO continues w/flows 3.5-4, speed 3300, Sweep 4, FIO2 50%, due to chugging event around 0400, 500mL LR bolus given. Jarrod titrated off at beginning of shift, due to frequent ectopy epi titrated off first, vaso off, levo remains for hemodynamic support. 18/450/5/40% tolerating well, per report pt CO2 levels okay to be 40-50 and pH >7.1; lactic trending down. Lott; marginal output, provider aware, 1 BM, OG to LIWS. No new skin issues present during shift.     Potassium replaced x 2  Calcium replaced x1  Sodium Phos replaced x 1    Plan: continue w/current support  For vital signs and complete assessments, please see documentation flowsheets.

## 2022-04-02 NOTE — PROVIDER NOTIFICATION
1610: CSI MD notified of pupil change with L size: 2.75 and R size: 2.47 with L pupil now slightly more sluggish than R, NPi 3.4 v 4.1.     1210: CSI Providers notified of pt's continued increased pressor requirement, despite current ECMO flow of ~4.4L.     1115: CSI Provider notified that pt is requiring increased pressors: epi 0.06, levo 0.14 and vaso 1 and that pt was chugging on ECMO and 500cc LR was given per PRN orders. MD also notified ECMO flow increased to 4.25L to help maintain BP.

## 2022-04-02 NOTE — PLAN OF CARE
Major Shift Events:  Pt remains intubated, sedated, on VA ECMO with IABP. Two episodes of chugging on ECMO this shift, 500cc LR given. ECMO flow increased to help maintain BP. Rewarming initiated at 1700, by 0.5 degrees Celsius per hour. vEEG monitoring initiated. L pupil > R, first noted at 1600, MD aware, continue to monitor. Echo and ultrasounds (lower extremities and carotid) done. CRRT and Blood Consent completed via telephone with CSHUDSON NARAYAN and pt's mom, in chart.     Labs/Protocols: K and calcium replaced x1 per PRN orders. Lactic Acid down trending, last result: 5.4. Creatinine, ALT and AST continue to up trend, MD aware.  Neuro: Sedated, RASS -5. vEEG monitoring in place. L pupil > R, see previous note and flowsheets. Rewarming from 34 degrees via thermogard.   Cardiac: Sinus faina to SR with PVCs, HR 50s-70s. MAP goal >65, multiple pressors in use throughout shift-see MAR. R femoral VA ECMO: 50%, sweep 4, flow 4.2-4.4, speed 3800. L Femoral IABP 1:1, 100%, no underlying pulsatility. Doppler pulses throughout, weak to RLE.   Respiratory: CMV 40%, 420, 18, 5. Lung sounds clear/diminished. Scant amount of tan secretions.   GI/: OG to LIS. Hypoactive bowel sounds. One loose stool this shift. Lott in place with low urine output ~10cc/hr, MD aware.   Skin: Abrasion to chest. LDA sites.   LDAs: R Femoral ECMO cannulas. L Femoral IABP. L femoral Thermogard with CVC. R radial A line. R PIV  GTTs: Epi (Do NOT titrate below 0.06 per MD), Levo, Vaso, Amio, Versed, Fentanyl, D10  Diet: NPO (hypothermic).   Activity: Bedrest. Repositioned Q 2 hours as tolerated for skin integrity.   Teaching: Patient's mom, dad and child's mom updated via phone. Patient's S.O. updated at bedside.     Plan: Continue current support and plan of care.   For vital signs and complete assessments, please see documentation flowsheets.     Goal Outcome Evaluation:    Plan of Care Reviewed With: patient, father, mother, significant other      Overall Patient Progress: no change

## 2022-04-02 NOTE — PROGRESS NOTES
ECMO Attending Progress Note  4/2/2022    Anonymous Yellow [place] is a 89 year old adult who was cannulated for ECMO 4/1/2022 due to refreactory cardiac arrest in the setting of drug overdose (PEA to VT)    Cannulation Site:  17 Fr in the R femoral artery  25 Fr in the R femoral vein    Interval events: cooled, pressor  Needs decreased some minimal chugging overnight responded to fluid bolus    Pulsatility 0    Physical Exam:  Temp:  [90.1  F (32.3  C)-94.6  F (34.8  C)] 93  F (33.9  C)  Pulse:  [57-84] 61  Resp:  [0-32] 18  MAP:  [56 mmHg-74 mmHg] 58 mmHg  Arterial Line BP: (69-86)/(42-62) 69/45  FiO2 (%):  [40 %-60 %] 40 %  SpO2:  [90 %-100 %] 99 %  No intake or output data in the 24 hours ending 04/01/22 1631 Vent Mode: CMV/AC  (Continuous Mandatory Ventilation/ Assist Control)  FiO2 (%): 40 %  Resp Rate (Set): 1 breaths/min  Tidal Volume (Set, mL): 420 mL  PEEP (cm H2O): 5 cmH2O  Resp: 18       Labs:  Recent Labs   Lab 04/02/22  1012 04/02/22  0757 04/02/22  0554 04/02/22  0356   PH 7.30* 7.28* 7.30* 7.28*   PCO2 42 44 41 40   PO2 108* 80 99 92   HCO3 21 21 20* 19*   O2PER 40 40 40 50  40  40      Recent Labs   Lab 04/02/22  1012 04/02/22  0355 04/01/22  2205 04/01/22  1639   WBC 21.2* 19.2* 25.9* 22.0*   HGB 13.6 14.2 13.5 12.9     Creatinine   Date Value Ref Range Status   04/02/2022 2.03 (H) 0.52 - 1.25 mg/dL Final     Comment:     20 y and older Female0.52-1.04 mg/dL  20 y and older Male0.66-1.25 mg/dL    Varies with the amount of muscle mass present.    GICH  Female: 0.6-1.2 mg/dL  Male: 0.7-1.3 mg/dL     04/01/2022 1.58 (H) 0.52 - 1.25 mg/dL Final     Comment:     20 y and older Female0.52-1.04 mg/dL  20 y and older Male0.66-1.25 mg/dL    Varies with the amount of muscle mass present.    GICH  Female: 0.6-1.2 mg/dL  Male: 0.7-1.3 mg/dL     04/01/2022 1.19 0.52 - 1.25 mg/dL Final     Comment:     20 y and older Female0.52-1.04 mg/dL  20 y and older Male0.66-1.25 mg/dL    Varies with the amount of  muscle mass present.    GICH  Female: 0.6-1.2 mg/dL  Male: 0.7-1.3 mg/dL                ECMO Issues including assessments and plan on DOS 4/2/2022:  Neuro: Sedated for mechanical ventilation and ECMO.  No acute distress.  NIRS 30s but bilaterally Dopplerable pulses  RASS goal: -2   CV: Cardiogenic shock.  Hemodynamically stable on NE, Epi weaned down, continue to titrate as able  Pulm: Keep vent settings at rest settings as above.  FEN/Renal:Electrolytes stable w/ replacement protocols in place will monitor cr and uop sluggish anticipate may need hd in next 12-24 hrs  Heme: ACT goal:180-200 will increase to 200-220 if av not opening, Hemoglobin 13.6.  Minimal oozing around the ECMO cannulas.  ID: Receiving empiric antibiotics mrsa swab to remove vanco  Cannulae: Position is acceptable on exam and the available imaging.  Distal perfusion cannula is in place and patent.  Extremities are well-perfused.     I have personally reviewed the ECMO flows, oxygenation and CO2 clearance, anticoagulation, and cannula position.  I have also personally assessed the patient's systemic response with hemodynamics, oxygenation, ventilation, and bleeding.       The patient requires continued ECMO support and management in the ICU.  I have discussed patient care and treatment plan with the primary team.      Chacho Valle MD  Critical Care Cardiology  [phone]    April 2, 2022

## 2022-04-02 NOTE — PLAN OF CARE
Major Shift Events: Patient transferred from cath lab (after CT) to  at ~1645. Pt on VA ECMO (R femoral): 60%, Sweep 4, Flow 4, Speed 3500. R femoral IABP 1:1, 100%. Pt on multiple high dose pressors: Epi, Levo, Vaso, Jarrod. Amio gtt. MD aware of ABG results throughout shift, okay with lower pH if >7.1 per CSI fellow. Per MD note goal CO2 40-50.   Plan: Continue current support.   For vital signs and complete assessments, please see documentation flowsheets.     Admitted/transferred from: Cath Lab  Reason for admission/transfer: Post arrest ECMO care  2 RN skin assessment: completed by Cindy Anderson and Nick Pitt  Result of skin assessment and interventions/actions: LDAs.   Height, weight, drug calc weight: Done  Patient belongings (see Flowsheet)  MDRO education added to care planN/A  ?

## 2022-04-02 NOTE — PROGRESS NOTES
[facility]  Cardiac ICU Progress Note  April 1, 2022         Subjective:    Remains intubated and sedated. Was able to come down on pressor support overnight but started chugging on ECMO and required 500ml LR.           H&P:   Anonymous Yellow [place] is a 36 year old adult male who was admitted on 4/1/2022. Patient was brought by EMS who gave the following report. EMS was called at 12:54 pm to the patient's residence where he had suffered a presumed cardiac arrest after taking methamphetamine and DMT. Per EMS, CPR was administered by room mates. Unclear how long down time prior TO room mates administered CPR or to EMS call. On arrival, EMS noted patient to be in PEA arrest with CPR continued and patient was given total 4 doses of Epi, once of bicarb, one of calcium and 3 doses of Narcan (one ?inhaled, 2 IV). He was also shocked 4 times by EMS for periods of VFib electrical activity enroute to the hospital. Of note, ROSC was never achieved by EMS. On presentation to the Cath lab, patient was in asystole with DENI delivered CPRS continued. Initial blood gas showed a pH of 6.7, O2 of 36. He was cannulated for VA ECMO at 2:08 pm. Post cannulation patient was alternating between PEA and VF with additional 3 total 200 J shocks delivered. Post cannulation coronary angiogram showed clean coronary arteries. Epi, Norepi, Vaso and Phenylephrine was started at high doses and patient was given multiple boluses of Amiodarone. Additionally, an intra-aortic balloon pump and thermogard was placed with patient transported to CT for brain imaging prior to arriving to the ICU for continued management.      Witnessed arest (y/n): yes  Home or public location (y/n): home  Bystander CPR (y/n): yes  Time of 911 call: 12.54 PM  Initial rhythm: PEA  Did they have intermittent ROSC (y/n): no  # of shocks: by EMS: 4,by cath lab 3  Epi:   4  Amps  Amio: none in the field, 2 boluses in the cath lab   Bicarb:  1  "amps  EtCO2 en route: unknwon  O2 sat en route:unknown, O2 on initial ABG on presentation was 36    Initial rhythm in the cath lab: PEA  First ABG: pH 6.7 CO2: 116  O2: 36  ECMO cannula size: 17 Fr to right femoral artery ? Fr to right femoral vein  Meds given in the cath lab: Epi, NorEpi, Vaso, Phenyl, Amio, 400 meq of sodium Bicarb  Initial PA catheter numbers: N/A           Past Medical History:   No past medical history on file.         Family History:   No family history on file.         Social History:     Social History     Socioeconomic History     Marital status: Single     Spouse name: Not on file     Number of children: Not on file     Years of education: Not on file     Highest education level: Not on file   Occupational History     Not on file   Tobacco Use     Smoking status: Not on file     Smokeless tobacco: Not on file   Substance and Sexual Activity     Alcohol use: Not on file     Drug use: Not on file     Sexual activity: Not on file   Other Topics Concern     Not on file   Social History Narrative     Not on file     Social Determinants of Health     Financial Resource Strain: Not on file   Food Insecurity: Not on file   Transportation Needs: Not on file   Physical Activity: Not on file   Stress: Not on file   Social Connections: Not on file   Intimate Partner Violence: Not on file   Housing Stability: Not on file            Medications:   I have reviewed this patient's current medications  No medications prior to admission.            Review of Systems:   Unable to obtain ROS due to pt's obtunded status            Physical Exam:   Vital signs:  Temp: (!) 93  F (33.9  C) Temp src: Esophageal   Pulse: 66   Resp: 18 SpO2: 97 % O2 Device: Mechanical Ventilator   Height: 180.3 cm (5' 11\") Weight: 84.2 kg (185 lb 10 oz)  Estimated body mass index is 25.89 kg/m  as calculated from the following:    Height as of this encounter: 1.803 m (5' 11\").    Weight as of this encounter: 84.2 kg (185 lb 10 " oz).    GENERAL: intubated and sedated  HEENT: ET tube in place  CV: S1/S2 heard with IABP murmur  RESPIRATORY: distant breath sounds  GI: obese, soft, no bowel sounds on asucultation   EXTREMITIES: 1+ peripheral edema. 2+ bilateral pedal pulses.   NEUROLOGIC: not oriented to place or time, does not follow commands  MUSCULOSKELETAL: No joint swelling or tenderness.   SKIN: No jaundice. No acute rashes or lesions.             Data:   Labs pending    Recent Results (from the past 24 hour(s))   Cardiac Catheterization    Narrative    ECMO cannulation and initiation while on Bry.  IABP placement in the LCFA.  Right arterial line placement.  Normal Coronaries   CT Head w/o Contrast    Narrative    CT HEAD W/O CONTRAST 4/1/2022 4:26 PM    History: Status post cardiac arrest     Comparison: None    Technique: Using multidetector thin collimation helical acquisition  technique, axial, coronal and sagittal CT images from the skull base  to the vertex were obtained without intravenous contrast.   (topogram) image(s) also obtained and reviewed.    Findings: There is no intracranial hemorrhage, mass effect, or midline  shift. Gray/white matter differentiation in both cerebral hemispheres  is preserved. Ventricles are proportionate to the cerebral sulci. The  basal cisterns are clear. Nonspecific fluid containing foci of air in  the nasal cavity. Minimal mucosal thickening of the maxillary sinuses.  Remainder of paranasal sinuses and mastoid air cells are clear. Orbits  are unremarkable.    No acute calvarial fracture. Persistent metopic suture.      Impression    Impression:  No acute intracranial pathology.    I have personally reviewed the examination and initial interpretation  and I agree with the findings.    PUJA TAY MD         SYSTEM ID:  [id number]   CT Chest Abdomen Pelvis w/o Contrast    Narrative    EXAMINATION: CT CHEST ABDOMEN PELVIS W/O CONTRAST, 4/1/2022 4:26 PM    INDICATION: Status post cardiac  arrest    COMPARISON STUDY: None available    TECHNIQUE: CT scan of the chest, abdomen and pelvis was performed on  multidetector CT scanner using volumetric acquisition technique and  images were reconstructed in multiple planes with variable thickness  and reviewed on dedicated workstations.     CT scan radiation dose is optimized to minimum requisite dose using  automated dose modulation techniques.    FINDINGS:  Lines and tubes: Endotracheal tube is 1.4 cm above the lizabeth. Right  inferior approach ECMO cannula tip in the right brachiocephalic vein.  Intra-aortic balloon pump superior marker at the proximal descending  thoracic aorta. Right femoral arterial line terminates in the distal  aorta. Left femoral central venous catheter terminates in the right  atrium. Gastric tube terminates within the stomach.    Chest:   Mediastinum: Cardiac size is within normal limits. No pericardial  effusion Normal caliber of the aorta. No pathologically enlarged  hilar, mediastinal or axillary lymphadenopathy. 11 mm right axillary  lymph node, likely reactive. Small amount of hyperattenuating material  in the anterior mediastinum/retrosternal region.    Pleura: No pleural effusion or thickening.    Lungs: Multifocal groundglass opacities predominantly in the upper  lung fields. There are superimposed dependent consolidative opacities  in the left upper lobe along the major fissure and left lower lobe.  Scattered sub-6 mm pulmonary nodules. For example adjacent pulmonary  nodules, measuring up to 4 mm in the left lower lobe (series 4 image  111 and 115). Endobronchial mucous plugging/debris in the lower lobes,  left greater than right, and lingula.     Abdomen and Pelvis:  Liver: No mass. No intrahepatic biliary ductal dilation.    Biliary System: Normal gallbladder. No extrahepatic biliary ductal  dilation.    Pancreas: Partially obscured by beam hardening defect. No mass or  pancreatic ductal dilation. Edematous pancreatic  parenchyma with  peripancreatic fat stranding.    Adrenal glands: No mass or nodules    Spleen: Normal.    Kidneys: No obstructing calculus or hydronephrosis.  No suspicious  renal mass allowing for noncontrast technique.    Gastrointestinal tract: Prominent air and fluid filled stomach. Normal  appendix. Diffusely prominent predominantly fluid-filled small bowel  with a few air-fluid levels but no focal transition point.  Moderate  rectal stool burden.    Mesentery/peritoneum/retroperitoneum: No mass. No free fluid or air.    Lymph nodes: No significant lymphadenopathy.    Vasculature: No infrarenal aneurysm.    Pelvis: Urinary bladder is normal.  Lott catheter in place    Osseous structures: Nondisplaced midsternal fracture. Mild chronic  superior endplate compression deformity of T1. Nondisplaced anterior  right fourth rib fracture.      Soft tissues: Within normal limits.      Impression    IMPRESSION:   1. Endotracheal tube tip in the low thoracic trachea. Venous ECMO  cannula terminates in the right brachiocephalic vein. IABP superior  marker at the level of the proximal descending thoracic aorta. Other  support devices detailed above.  2. Multifocal groundglass opacities with superimposed consolidative  opacities in the left lower and upper lobes. May represent aspiration  vs. infection, particularly given endobronchial debris/mucus plugging  in the left greater than right lower lobes and lingula  3. Nondisplaced midsternal fracture with a small amount of  retrosternal hemorrhage. Nondisplaced anterior right fourth rib  fracture. No pneumothorax.  4. Edematous pancreas with peripancreatic fat stranding raises concern  for acute residual edematous pancreatitis.  5. Diffusely prominent and predominantly fluid-filled small bowel with  a few air-fluid levels but no focal transition point, likely  reflecting some degree of adynamic ileus.  6. Gastric distention with a gastric tube in place.    I have personally  reviewed the examination and initial interpretation  and I agree with the findings.    EPI RENAE DO         SYSTEM ID:  [id number]   XR Chest Port 1 View    Narrative    EXAM: XR CHEST PORT 1 VIEW  4/1/2022 5:55 PM     HISTORY:  Check endotracheal tube placement and ECLS cannula  placement. DO NOT log-roll patient.  Place film under patient using  patient safety handling process.       COMPARISON:  Same-day CT chest    FINDINGS:     Portable upright view of the chest. Endotracheal tube tip projects 2.6  cm above the lizabeth. Inferior approach ECMO cannula projects over the  right brachiocephalic vein. Esophageal temperature probe tip  terminates over the hypopharynx. Intra-aortic balloon pump radiodense  superior marker projects over the level of the proximal descending  thoracic aorta. Enteric tube tip courses below the diaphragm and  beyond the field of view.     Trachea is midline. Cardiomediastinal silhouette is within normal  limits. Prominent pulmonary vasculature. Low lung volumes. Perihilar  and hazy patchy opacities in the left upper lung field. No acute  osseous abnormality. Visualized upper abdomen is unremarkable.        Impression    IMPRESSION:  1. Endotracheal tube tip projects 2.6 cm above the lizabeth. Inferior  approach ECMO cannula projects over the right brachiocephalic vein.  2. Esophageal temperature probe tip projects over the hypopharynx.   3. Perihilar and hazy patchy opacities in the left upper lung, better  seen on comparison CT.     I have personally reviewed the examination and initial interpretation  and I agree with the findings.    EPI RENAE DO         SYSTEM ID:  [id number]   XR Abdomen Port 1 View    Narrative    EXAMINATION:  XR ABDOMEN PORT 1 VIEWS 4/1/2022 5:55 PM.    COMPARISON: Same day CT    HISTORY:  OG tube placement    FINDINGS: Frontal view of the upper abdomen/lower chest. Stable  position of IABP and inferior approach ECMO cannula and central venous  catheter.  Gastric tube with tip and sidehole projecting over the  stomach. Stomach is distended with air. Gaseous distended small bowel  partially visualized. Lungs are clear.      Impression    IMPRESSION: Gastric tube tip and sidehole project over the stomach,  which is dilated with air.    I have personally reviewed the examination and initial interpretation  and I agree with the findings.    EPI RENAE DO         SYSTEM ID:  [id number]   XR Chest Port 1 View    Narrative    Exam: XR CHEST PORT 1 VIEW, 4/2/2022 2:15 AM    Indication: Check endotracheal tube placement and ECLS cannula  placement. DO NOT log-roll patient.  Place film under patient using  patient safety handling process.    Comparison: 4/1/2022    Findings:   Portable semiupright AP view of the chest. Endotracheal tube tip  projected over the lower thoracic trachea 2 cm proximal to lizabeth.  ECMO cannula projects over the high SVC. Partially visualized enteric  tubes.    Midline trachea, normal cardial mediastinal silhouette. No acute  pulmonary opacities, pleural effusion, or significant pneumothorax.  Markedly distended stomach stomach.      Impression    Impression:  1. Endotracheal tube and ECMO cannula as detailed.  2. No acute cardiopulmonary abnormalities.    I have personally reviewed the examination and initial interpretation  and I agree with the findings.    ALEXANDER VILLELA MD         SYSTEM ID:  [id number]              Assessment and Plan:   Changes today:  -Pending TTE to assess for AV opening and LV function. If no AV opening, prefer going up on Epi if required.  -If no AV opening, higher ACT goal of 200-220. If AV opening then ACT goal of 180-200.  -Warming starting today evening at around 5PM by0.5 degrees C an hour    Neurology: # Concern for Anoxic Brain injury  # Possible Encephalopathy  Intubated, sedated, paralyzed.   Cooled to 34 degrees. CT Head (4/1) unremarkable.   --continue versed, fentanyl, may use paralytic as needed to maintain  RASS goal -3.  - If paralyzing, will add on BIS monitoring with goal of 40 - 60  - High concern for anoxic brain injury given persistently low O2 sats during field CPR and PAO2 of 36 on arrival. Guarded prognosis in terms of neurologic recovery at this point.  -Neurocrit following, appreciate recs   Cardiovascular / Hemodynamics: # PEA arrest complicated by VF  # Cardiogenic shock with profound vasoplegia and hypotension  # Drug induced cardiac arrest  #ECMO Cannulation (4/1)  #IABP Placement (4/1)  No CAD on coronary angiogram.  Peripheral V-A ECMO inserted for cardiopulmonary support. IABP insert for maximal support.  TTE: pending  --wean pressors/inotropes as able  --continue ASA 81mg and ticagrelor 90mg BID  --ACT goal 180-200  --hold lipitor for now given likely hepatic injury during arrest  --hold ACE/ARB for now given likely reduced renal fxn after arrest  --holding beta blocker given shock      Pulmonary: # Acute Hypoxic Respiratory failure  ETT in place at ~2 cm above the lizabeth.    Vent Mode: CMV/AC  (Continuous Mandatory Ventilation/ Assist Control)  FiO2 (%): 40 %  Resp Rate (Set): 1 breaths/min  Tidal Volume (Set, mL): 420 mL  PEEP (cm H2O): 5 cmH2O  Resp: 18    CXR: Lines in stable position. Remarkably clear. Currently low suspicion for aspiration   --Permissive hypercapnia with CO2 target 40-50  --wean vent as able  --daily CXR  --Q2h ABGs for now  --consider scheduled duonebs if signs of lung dz, currently PRN     GI and Nutrition: # Shock liver secondary to cardiac arrest  No known medical hx.   --monitor BID LFTs  --NPO while cooled  -- nutrition consult pending feeding tube placement once he is warmed   --bowel regimen - on hold for now due to hypothermia  --GI Prophylaxis: PPI    Renal, Fluid and Electrolytes: #Acute Renal Failure  # Severe lactic acidosis  #Hypernatremia  Cr uptrending and minimal UOP. Currently no strong indication for CRRT but will likely need it soon. Hypernatremia likely  from sodium bicarb pushes. Permissive hypernatremia with goal Na <160  --monitor urine output  --maintain K~3 and Mg>2    Infectious Disease: # Possible aspiration penumonia  No signs of infection. Leukocytosis c/w arrest. Blood cultures collected. Sputum culture (4/1) unremarkable.  --vancomycin/zosyn x5 days for ECMO (4/1-4/5)  --daily blood cultures  --monitor for signs of infection given cooling, lines, and leukocytosis   Hematology and Oncology: # Possible Coagulopathy  Received heparin while in the cath lab  Will continue heparin for ECMO to maintain ACT goal.    --cryo PRN fibrinogen < 200; FFP for INR >2  --Transfuse for Hgb<10  --heparin gtt for ECMO with ACT goal 160-180  --US LE w/ arterial duplex per ECMO protocol   --DVT PPX: Heparin as above   Endocrinology: --HgbA1c 5.3   --On insulin gtt   Lines:   PA catheter April 1, 2022  R femoral arterial and venous ECMO cannulae April 1, 2022  L femoral arterial line April 1, 2022  R radial arterial line April 1, 2022  ETT April 1, 2022  Lott catheter April 1, 2022  OG tube April 1, 2022  Restraint: needed    Current lines are required for patient management       Family update by me today: Yes   Mother, Dayana was updated by me. She lived in [place]. Contact number: [phone].   Per mother, patient has an aunt, Teri Jiménez,  who lives in [place], contact number: [phone]    Code Status: Full confirmed with mother on the phone.       The Pt was discussed and evaluated with Dr. Jerod MD, attending physician, who agrees with the assessment and plan above.     Feliberto Bucio MD  Cardiology Fellow

## 2022-04-03 NOTE — PROGRESS NOTES
EEG CLINICAL NEUROPHYSIOLOGY PRELIMINARY REPORT    EEG through 6 AM today reviewed.  Hypothermia was reversed.  Continues on fentanyl 200 mcg/h and midazolam 8 mg/h throughout the study.  Findings are severely abnormal.    1) electrocerebral activity was present but was severely attenuated, discontinuous, and unreactive.  Findings are consistent with a severe diffuse encephalopathy.  There was minor improvement in amplitude of electrocerebral activity following discontinuation of hypothermia but results continued consistent with severe diffuse encephalopathy.  2) electrographic seizures or periodic discharges were not seen at any point, even during a brief period of recording following discontinuation of hypothermia.    Full report in chart.    Jean Marie Mota MD  Contact information for physicians covering Epilepsy and EEG is available on [facility].  Click search, enter neurology adult/[facility] in group name box, click on neurology adult/[facility], then click Staff Epilepsy and EEG.

## 2022-04-03 NOTE — PLAN OF CARE
Major Shift Events:  Pt remains intubated, on VA ECMO with IABP. All sedation stopped at 1010. ECMO flow decreased to ~4L. CRRT initiated at ~1430 due to high K (6.2). Vaso gtt weaned off. Amio gtt stopped, PO Amio initiated. TF started via OG.     Labs/Protocols: Increased K, Mag and Phos, CRRT initiated. Creatinine continues to up trend, MD aware, Nephrology Following. Calcium replaced x1.   Neuro: Unresponsive, RASS -5. Thermogard remains in place for strict normothermia. vEEG remains in place, Neurology following.   Cardiac: SR, HR 90s, Frequent PVCs. MAP goal >65, met with pressors. VA ECMO: 50%, sweep 1.5, flow ~4, speed 3600. L femoral IABP 1:1, 100%.  Doppler pulses throughout.   Respiratory: CMV 40%, 420, 18, 5. Clear/diminished lung sounds. Scant amount of secretions via ETT.   GI/: OG with TF. Lott in place with low urine output, <10cc/hr, down trending. CRRT via ECMO, goal I=O, unable to meet due to BP.   Skin: Abrasion to chest. LDA sites.   LDAs: R Femoral ECMO cannulas, L Femoral IABP, thermogard and CVC, R radial A line, R and L PIV.   GTTs: Epi, Levo, Heparin via ECMO  (on/off), D10.   Diet: TF via OG; goal 45cc/hr, currently at 15, increase by 10cc/hr Q 8 hours. 30cc water Q 4 hours.   Activity: Bedrest. Repositioned Q 2 hours for skin integrity.   Teaching: Patient's father updated on patient status and plan of care via phone. Pt's S.O. (Tamika) and children's mom (Kacey) updated on patient condition and plan of care at bedside.     Plan: Continue current support and monitoring. Possible turn down study tomorrow (4/4).   For vital signs and complete assessments, please see documentation flowsheets.       Goal Outcome Evaluation:    Plan of Care Reviewed With: patient     Overall Patient Progress: improving

## 2022-04-03 NOTE — CONSULTS
[facility]  WO Nurse Inpatient Adult Pressure Injury Prevention Assessment: ECMO  Initial     Positioning Tolerance: Fair  Date of ECMO cannulation: 4/1/22  Presence of Ischemia: No  Location of ischemia: n/a    Pressure Injury Prevention Interventions In Place:  Optifoam Dressing under ECMO Cannula, Z flow Positioner under head, Pillows for repositioning, TAPs Wedge Positioners in use, Heel off-loading boots, Pillows under calves for heel suspension and Mepilex Sacral Dressing   Current support surface: Standard  Low air loss mattress       Pressure Injury Prevention Interventions Added:  None        Plan of Care for Positioning and Pressure Injury Prevention  Reposition patient every 1-2 hours using TAP Wedges  Position head on Z flow positioner, mold indentation at areas of pressure points.  Pad ECMO IJ cannula with Optifoam (#[id number]) along face and scalp between skin and cannula and under Coban head wraps    Pad ECMO groin and chest cannula under rigid connectors with Optifoam or Soft cloth  Heel off-loading Boots at all times  Sacral Mepilex for Prevention, change every 5 days and prn  Low Air loss mattress    Patient History:   According to medical record: Angie Caruso is a 36 year old adult male who was admitted on 4/1/2022. Patient was brought by EMS who gave the following report. EMS was called at 12:54 pm to the patient's residence where he had suffered a presumed cardiac arrest after taking methamphetamine and DMT. Per EMS, CPR was administered by room mates. Unclear how long down time prior TO room mates administered CPR or to EMS call. On arrival, EMS noted patient to be in PEA arrest with CPR continued and patient was given total 4 doses of Epi, once of bicarb, one of calcium and 3 doses of Narcan (one ?inhaled, 2 IV). He was also shocked 4 times by EMS for periods of VFib electrical activity enroute to the hospital. Of note, ROSC was never  achieved by EMS. On presentation to the Cath lab, patient was in asystole with DENI delivered CPRS continued. Initial blood gas showed a pH of 6.7, O2 of 36. He was cannulated for VA ECMO at 2:08 pm. Post cannulation patient was alternating between PEA and VF with additional 3 total 200 J shocks delivered. Post cannulation coronary angiogram showed clean coronary arteries. Epi, Norepi, Vaso and Phenylephrine was started at high doses and patient was given multiple boluses of Amiodarone. Additionally, an intra-aortic balloon pump and thermogard was placed with patient transported to CT for brain imaging prior to arriving to the ICU for continued management.    Current Diet / Nutrition:     Orders Placed This Encounter      NPO for Medical/Clinical Reasons Except for: No Exceptions      Output:    I/O last 3 completed shifts:  In: 2975.38 [I.V.:2975.38]  Out: 381 [Urine:281; Emesis/NG output:100]  Containment: of urine/stool: Incontinence Protocol, Flexi-Seal and Urinary Catheter    Risk Assessment:   Sensory Perception: 1-->completely limited  Moisture: 4-->rarely moist  Activity: 1-->bedfast  Mobility: 1-->completely immobile  Nutrition: 2-->probably inadequate  Friction and Shear: 2-->potential problem  Nacho Score: 11    Labs:  Recent Labs   Lab 04/03/22  0349 04/02/22  0355 04/01/22  2205   ALBUMIN 2.1*   < >  --    HGB 13.0*   < > 13.5   INR 1.65*   < > 1.76*   WBC 21.6*   < > 25.9*   A1C  --   --  5.3   CRP 97.0*   < >  --     < > = values in this interval not displayed.       Focused Assessment:  Right groin ECMO cannula    Pressure Injury Present::No     Skin injury to midline chest due to DENI    4/3    Education provided to: nurse  Discussed importance of:their role in pressure injury prevention  Discussed plan of care with Nurse  WOC Nurse follow-up plan:weekly    Saba Angulo RN CWOCN

## 2022-04-03 NOTE — PROGRESS NOTES
"CLINICAL NUTRITION SERVICES - ASSESSMENT NOTE     Nutrition Prescription    Malnutrition Status:    Unable to determine due to unable to assess all criteria at this time    Recommendations already ordered by Registered Dietitian (RD):  1. Given EF <30%, ordering thiamine 100 mg daily    2. Ordered EN support:   Novasource Renal @ 45 mL/hr (1080 mL) + 2 pkts ProSource QID (8 pkts daily) = 2480 kcal (30 kcal/kg), 186 g PRO (2.2 g/kg), 198 g CHO, and 778 mL water daily.  - Initiate @ 15 mL/hr and advance by 10 mL q8hr as tolerated. Do not start or advance unless K+ >/= 3.4, Mg++ >1.5, and phos >1.9  - 30 mL q4hr fluid flushes for tube patency. Additional fluids and/or adjustments per MD.    - Nephronex, renal multivitamin/mineral (5 mL/day via FT) to help ensure micronutrient needs being met with suspected hypermetabolic demands and potential interruptions to TF infusions.  - Gastric access: HOB >30 degrees.     3. Given hx of substance abuse, ordering 1 mg folic acid as well    Future/Additional Recommendations:  Monitor tolerance and lytes with advancement to goal EN support rate via OG.      REASON FOR ASSESSMENT  Michael Callejas is a/an 36 year old male assessed by the dietitian for Provider Order - Registered Dietitian to Assess and Order TF per Medical Nutrition Therapy Protocol    NUTRITION HISTORY  Not previously known to Clinical Nutrition. No allergies on file. Unable to obtain nutrition hx d/t pt intubated and sedated.    CURRENT NUTRITION ORDERS  Diet: NPO  Enteral Nutrition: (ordered by primary team) NovaSource Renal @ 10 mL/hr    LABS  Labs reviewed  -Na+ 145 (H)  -K+ 6.2 (H)  -BUN 67 (H)  -Cr 4.46 (H)  -Mg++ 2.7 (H)  -Phos 6.4 (H)    MEDICATIONS  Medications reviewed  -Epi gtt (stable)  -Norepi gtt (stable)  -Vaso gtt (downtrended/stable)  -D10W @ 15 mL/hr  -Lasix  -Insulin gtt    ANTHROPOMETRICS  Height: 180.3 cm (5' 11\")  Most Recent Weight: 86.1 kg (189 lb 13.1 oz)    IBW: 78.2 kg   BMI: 25.9 kg/m2; " Overweight BMI 25-29.9  Weight History: No documents in Care Everywhere. Unable to obtain/assess wt hx.  Wt Readings from Last 20 Encounters:   04/03/22 86.1 kg (189 lb 13.1 oz)   Dosing Weight: 84 kg (actual, based on lowest/admit wt of 84.2 kg on 4/2)    ASSESSED NUTRITION NEEDS  Estimated Energy Needs: 7340-8817 kcals/day (25 - 30 kcals/kg)  Justification: Maintenance  Estimated Protein Needs: 151-210 grams protein/day (1.8 - 2 - 2.5 grams of pro/kg)  Justification: Hypercatabolism with critical illness and Increased needs on VA ECMO and CRRT  Estimated Fluid Needs: 1 mL/kcal   Justification: Maintenance or Per provider pending fluid status    PHYSICAL FINDINGS  See malnutrition section below.  -No PIs present per WOC note today  -ETT  -OGT (AXR)  -VA ECMO  -CRRT  -Temperature 36.9 C  -EF 5-10% per ECHO on 4/2    MALNUTRITION  % Intake: Unable to assess  % Weight Loss: Unable to assess  Subcutaneous Fat Loss: Unable to assess (writer remote)  Muscle Loss: Unable to assess (writer remote)  Fluid Accumulation/Edema: Does not meet criteria (minimal per Nephrology note today)  Malnutrition Diagnosis: Unable to determine due to unable to assess all criteria at this time    NUTRITION DIAGNOSIS  Inadequate protein-energy intake related to NPO status in setting of intubation as evidenced by pt currently meeting 0% minimum assessed needs (not accounting for kcal from lipid/dextrose-containing medications).    INTERVENTIONS  Implementation  -Nutrition Education: Not appropriate at this time due to patient condition   -Collaboration with other providers: Discussed with CSI, ok for OG feeds and slow adv to goal rate TF  -Enteral Nutrition - Initiate  -Feeding tube flush  -Multivitamin/mineral supplement therapy     Goals  Total avg nutritional intake to meet a minimum of 25 kcal/kg and 1.8 g PRO/kg daily (per dosing wt 60 kg).     Monitoring/Evaluation  Progress toward goals will be monitored and evaluated per  protocol.    Kassidy Thurman RD, LD  Weekend Pager: [phone]

## 2022-04-03 NOTE — PLAN OF CARE
Shift Summary [id number]    Major Shift Events:  Pt remains intubated/sedated on versed/fent. RASS -5, PERRLA, TTM continues, pt rewarmed at 0.5C/hr to normothermia, reached at 0000. ECMO continues w/flows 4.4-4.5, speed 3800, Sweep 1.5, FIO2 50%, due to circuit CO2 being higher than pts, vent settings adjusted, rate decreased to 14. IABP remains, due to pt gaining pulsatility, fiberoptic connected again, switched to auto.  Epi, Levo, and vaso remains for hemodynamic support. 14/450/5/40% tolerating well; lactic continues to trend down. Lott; marginal output, provider aware, 1 BM, OG to LIWS. No new skin issues present during shift.     Procalcitonin, ALT, AST elevated, providers aware    Calcium replaced x1 when normothermia was reached  Mag replaced x 1 when normothermia was reached      Plan: continue w/current support  For vital signs and complete assessments, please see documentation flowsheets.

## 2022-04-03 NOTE — PROGRESS NOTES
ECMO Attending Progress Note  4/3/2022    Michael Callejas is a 36 year old adult who was cannulated for ECMO 4/1/2022 due to refreactory cardiac arrest in the setting of drug overdose (PEA to VT).    Cannulation Site:  17 Fr in the R femoral artery  25 Fr in the R femoral vein    Interval events:rewarmed at midnight, pulsatility returning  Pulsatility 20-25    Physical Exam:  Temp:  [93  F (33.9  C)-98.6  F (37  C)] 98.4  F (36.9  C)  Pulse:  [61-94] 92  Resp:  [14-18] 14  MAP:  [57 mmHg-82 mmHg] 66 mmHg  Arterial Line BP: ()/(42-62) 78/49  FiO2 (%):  [40 %] 40 %  SpO2:  [92 %-100 %] 100 %  No intake or output data in the 24 hours ending 04/01/22 1631 Vent Mode: CMV/AC  (Continuous Mandatory Ventilation/ Assist Control)  FiO2 (%): 40 %  Resp Rate (Set): 14 breaths/min  Tidal Volume (Set, mL): 420 mL  PEEP (cm H2O): 5 cmH2O  Resp: 14       Labs:  Recent Labs   Lab 04/03/22  0953 04/03/22  0803 04/03/22  0600 04/03/22  0350   PH 7.29* 7.29* 7.30* 7.31*   PCO2 40 39 40 38   PO2 133* 133* 129* 99   HCO3 19* 19* 20* 19*   O2PER 40 40 40  50  40 40  50  40      Recent Labs   Lab 04/03/22  0953 04/03/22  0349 04/02/22  2152 04/02/22  1555   WBC 21.5* 21.6* 21.0* 23.7*   HGB 12.6* 13.0* 13.0* 13.8     Creatinine   Date Value Ref Range Status   04/03/2022 3.71 (H) 0.66 - 1.25 mg/dL Final   04/02/2022 3.19 (H) 0.66 - 1.25 mg/dL Final   04/02/2022 2.84 (H) 0.66 - 1.25 mg/dL Final   04/02/2022 2.39 (H) 0.66 - 1.25 mg/dL Final              ECMO Issues including assessments and plan on DOS 4/66090:  Neuro: Sedated for mechanical ventilation and ECMO, will stop totally and add back as needed.  No acute distress.  NIRS 40-50s but bilaterally Dopplerable pulses  RASS goal: -2   CV: Cardiogenic shock.  Hemodynamically stable on NE, Epi and vaso weaned down, continue to titrate as able pulsatility with iabp held is 20-25mmHg  Pulm: Keep vent settings at rest settings as above.  FEN/Renal:Electrolytes stable w/ replacement  protocols in place will monitor cr and uop sluggish anticipate may need hd in next 12-24 hrs  Heme: ACT goal:180-200 Hemoglobin 12.6.  Minimal oozing around the ECMO cannulas.  ID: Receiving empiric antibiotics  Cannulae: Position is acceptable on exam and the available imaging.  Distal perfusion cannula is in place and patent.  Extremities are well-perfused.     I have personally reviewed the ECMO flows, oxygenation and CO2 clearance, anticoagulation, and cannula position.  I have also personally assessed the patient's systemic response with hemodynamics, oxygenation, ventilation, and bleeding.       The patient requires continued ECMO support and management in the ICU.  I have discussed patient care and treatment plan with the primary team.      Chacho Valle MD  Critical Care Cardiology  [phone]    April 3, 2022

## 2022-04-03 NOTE — PROGRESS NOTES
CRRT INITIATION NOTE    Consent for CRRT Completed:  YES  Patient s Vascular Access: Catheter              Placement Confirmed: YES  Manufacture:  NA    DATA:  Procedure:  CVVHDF  Start Time:  1321  Machine#:  [id number]  Filter:    Blood Flow:  200  ML/min  Pre-Replacement Solution:  Lyn Sol 2/3.5  Post-Replacement Solution:   Lyn Sol 2/3.5  Dialysate Solution:   Lyn Sol 2/3.5  Pre-Replacement Solution Rate:  1100 mL/hr  Post-Replacement Solution Rate:  200 mL/hr  Dialysate Flow Rate:  1100 mL/hr   Patient Removal Rate:  0 mL/hr  Anticoagulation Type and Rate:  NA    ASSESSMENT:  How Patient Tolerated Initiation:   Vital Signs:  Temp: 98.4  F (36.9  C) Temp src: Esophageal   Pulse: 94   Resp: 14 SpO2: 100 % O2 Device: Mechanical Ventilator      Initial Pressures:  Access:  50  Filter:  63  Return:  33  TMP:  52  Change in Filter Pressure:  8      INTERVENTIONS:  Initiated CVVHDF    PLAN:  Continue with treatment goals. Call CRRT RN at [phone] with questions and concerns.

## 2022-04-03 NOTE — PROGRESS NOTES
ECMO Shift Summary: [id number]    ECMO Equipment:  Console serial number: [id number]  Circuit Lot number: [id number]  Oxygenator Lot number: [id number]    Patient remains on VA ECMO, all equipment is functioning and alarms are appropriately set. RPM's 3800 with flow range ~4.4-4.5 L/min. Sweep gas is at 1.5 LPM and FiO2 50%. Circuit remains free of air. Small amount of fibrin and clot forming at connector sites. Cannulas are secure; only very slight oozing from venous cannula insertion. Extremities are warm/perfused. Suctioned ETT for scant secretions.     Significant Shift Events: Patient fully rewarmed at 2300. Sweep was titrated down slowly from 4 to 1.5 lpm to target Stella of 40-50. Heparin on very low dose to keep -200. Improvement in pulsatility overnight. Liver enzymes down trending now. Kidney labs still worsening. ECMO dressing changed with RN; very slight oozing noted from venous cannula site so QuickClot gauze applied there.     Post-oxy blood gas at 0400 yielded PO2 of only 63 (FiO2 50%, sweep 1.5 lpm). Repeat gas at 0600 had PO2 of 103 (FiO2 50%, sweep 1.5 lpm).     Vent settings:  Vent Mode: CMV/AC  (Continuous Mandatory Ventilation/ Assist Control)  FiO2 (%): 40 %  Resp Rate (Set): (S) 14 breaths/min  Tidal Volume (Set, mL): 420 mL  PEEP (cm H2O): 5 cmH2O  Resp: 18    Anticoagulation and Volume Status:  Heparin is running at 150 u/hr, ACT range 177-203 with goal of 180-200.    Urine output is very low, blood loss was nil. No blood products given during shift.     Intake/Output Summary (Last 24 hours) at 4/3/2022 0623  Last data filed at 4/3/2022 0600  Gross per 24 hour   Intake 2824.38 ml   Output 366 ml   Net 2458.38 ml       Imaging Studies:  Recent Results (from the past 24 hour(s))   Echocardiogram Complete    Narrative    [id number]  [id number]  [id number]  [id number]^BRENDAN^KEE^DARION     [facility]  Echocardiography [facility]  [address] [place]  [address]     Name: TIERNEY CARVALHO  MRN: [id number]  : 1985  Study Date: 2022 10:52 AM  Age: 36 yrs  Gender: Male  Patient Location: [facility]  Reason For Study: Shock  Ordering Physician: KEE CARDONA  Performed By: Jinny Mueller RDCS     BSA: 2.0 m2  Height: 71 in  Weight: 185 lb  HR: 52  BP: 70/44 mmHg  ______________________________________________________________________________  Procedure  Complete Portable Echo Adult. Technically difficult study.Extremely poor  acoustic windows.  ______________________________________________________________________________  Interpretation Summary  Technically difficult study.Extremely poor acoustic windows.  Baseline (VA ECMO at 3.9 L/min and IABP):     LV size is normal, LVIDd 4.2 cm. The LV function is severely reduced, LVEF 5  to 10%, there is severe diffuse hypokinesis. Septum midline. The aortic valve  partially opens with each cardiac cycle.  The RV is small in size with moderately reduced RV function.  No pericardial effusion is present.  ______________________________________________________________________________  Atria  Both atria appear normal.     Mitral Valve  The mitral valve is normal.     Vessels  ECMO cannulae is noted in the IVC.     Pericardium  No pericardial effusion is present.     ______________________________________________________________________________  MMode/2D Measurements & Calculations  IVSd: 0.74 cm  LVIDd: 4.2 cm  LVIDs: 4.0 cm  LVPWd: 0.82 cm     FS: 6.4 %  LV mass(C)d: 99.2 grams  LV mass(C)dI: 48.6 grams/m2  RWT: 0.39     ______________________________________________________________________________  Report approved by: MD Matthew Bennett 2022 02:10 PM         XR Chest Port 1 View    Impression    RESIDENT PRELIMINARY INTERPRETATION  Impression:  1. Endotracheal tube projects 2 cm proximal to lizabeth. ECMO cannula  projects over high SVC.  2. No acute cardiopulmonary abnormalities.          Labs:  Recent Labs   Lab 04/03/22  0600 04/03/22  0350 04/03/22  0148 04/02/22  2355   PH 7.30* 7.31* 7.35 7.35   PCO2 40 38 34* 34*   PO2 129* 99 116* 88   HCO3 20* 19* 19* 19*   O2PER 40  50  40 40  50  40 40 40       Lab Results   Component Value Date    HGB 13.0 (L) 04/03/2022    PHGB 30 (H) 04/03/2022     (L) 04/03/2022    FIBR 312 04/03/2022    INR 1.65 (H) 04/03/2022    PTT 39 (H) 04/03/2022    DD 0.46 04/03/2022    ANTCH 55 (L) 04/02/2022       Plan:  Plan is to continue full VA ECMO support.     Kassidy Lisa, RT  ECMO Specialist  4/3/2022   6:30 AM

## 2022-04-03 NOTE — PHARMACY-VANCOMYCIN DOSING SERVICE
"      Pharmacy Vancomycin Note  Date of Service 2022  Patient's  1985   36 year old, male    Indication: Aspiration Pneumonia  Day of Therapy: 2  Current vancomycin regimen:  intermittent  Current vancomycin monitoring method: Trough (Method 2 = manual dose calculation)  Current vancomycin therapeutic monitoring goal: 15-20 mg/L      Current estimated CrCl = Estimated Creatinine Clearance: 43.6 mL/min (A) (based on SCr of 2.84 mg/dL (H)).    Creatinine for last 3 days  2022:  4:39 PM Creatinine 1.19 mg/dL; 10:05 PM Creatinine 1.58 mg/dL  2022:  3:55 AM Creatinine 2.03 mg/dL; 10:12 AM Creatinine 2.39 mg/dL;  3:55 PM Creatinine 2.84 mg/dL    Recent Vancomycin Levels (past 3 days)  2022:  6:46 PM Vancomycin 20.9 mg/L    Vancomycin IV Administrations (past 72 hours)                   vancomycin (VANCOCIN) 1,750 mg in sodium chloride 0.9 % 500 mL intermittent infusion (mg) 1,750 mg New Bag 22 1847                Nephrotoxins and other renal medications (From now, onward)    Start     Dose/Rate Route Frequency Ordered Stop    22 2100  vancomycin 1500 mg in 0.9% NaCl 250 ml intermittent infusion 1,500 mg         1,500 mg  over 90 Minutes Intravenous ONCE 22 1446  vancomycin place marin - receiving intermittent dosing         1 each Does not apply SEE ADMIN INSTRUCTIONS 22 1447      22 1200  piperacillin-tazobactam (ZOSYN) 2.25 g vial to attach to  ml bag        Note to Pharmacy: For [facility], [facility] and [facility]: For Zosyn-naive patients, use the \"Zosyn initial dose + extended infusion\" order panel.    2.25 g  over 30 Minutes Intravenous EVERY 6 HOURS 22 0849 22 1159    22 1930  vasopressin 1 unit/mL MAX Conc (PITRESSIN) infusion         0.5-4 Units/hr  0.5-4 mL/hr  Intravenous CONTINUOUS 22 1917      22 1730  norepinephrine (LEVOPHED) 16 mg in  mL infusion MAX CONC CENTRAL LINE         0.01-0.6 mcg/kg/min × 88 kg " (Dosing Weight)  0.8-49.5 mL/hr  Intravenous CONTINUOUS 04/01/22 1709               Contrast Orders - past 72 hours (72h ago, onward)            None          Interpretation of levels and current regimen:  Vancomycin level is reflective of therapeutic level (after first dose)    Has serum creatinine changed greater than 50% in last 72 hours: Yes    Urine output:  diminishing    Renal Function: Worsening    Plan:  1. Redose this evening with Vancomycin 1500 mg iv x 1 then continue with intermittent dosing  2. Vancomycin monitoring method: Trough (Method 2 = manual dose calculation)  3. Vancomycin therapeutic monitoring goal: 15-20 mg/L  4. Pharmacy will check vancomycin levels as appropriate in 1-3 Days.  5. Serum creatinine levels will be ordered daily for the first week of therapy and at least twice weekly for subsequent weeks.    Kathy Mckeon, HerbD

## 2022-04-03 NOTE — CONSULTS
Nephrology Initial Consult  April 3, 2022      Michael Callejas MRN:[id number] YOB: 1985  Date of Admission:4/1/2022  Primary care provider: No Ref-Primary, Physician  Requesting physician: Papito Devi MD    ASSESSMENT AND RECOMMENDATIONS:     1. HALEIGH - Creat 4.3 today, up from admission 1.1. Urine output declining, K 6.3.    - Etiology of his HALEIGH is ATN from cardiogenic shock   - Consent obtained from mother by primary team, I discussed with her today per phone and she confirmed consent for renal replacement therapy. I answered her questions to the best of my ability   - UF goal today will be I = O   - Continue to monitor for renal recovery with daily chemistry labs/ strict I/O and daily weights   - CRRT access is through the ecmo circuit    2. Volume status - Mild hypervolemia. He is intubated. Weight 86.1 kg. Admission weight 84.2 kg. Intake 3512 ml/Output:  ml,  ml for net fluid gain of 2.9 liters. On 3 pressors   - UF goal today will be I = O   - Continue daily weights/strict I/O    3. Cardiogenic shock - On VA ecmo/IABP. MAPs > 65 on Epi 0.06, Levo 0.02 and Vasopressin 1 unit(s).    - Per CV    4. Electrolytes - K 6.2, Na 146   - Running on a K 2 bath    5. Acid base - Bicarb 19   - Will be corrected on CRRT    6. Colon Ca? - Needs further evaluation. Perhaps getting care everywhere updated would be helpful. Per mother patient was seeing someone here in [place] who told him it was too late for surgery.     Recommendations were communicated to primary team directly    Seen and discussed with Dr. David Salinas NP   Division of Renal Disease and Hypertension  [facility]  myairmail  Vocera Web Console    REASON FOR CONSULT: HALEIGH    HISTORY OF PRESENT ILLNESS:  Admitting provider and nursing notes reviewed  Michael Callejas is a 36 year old male with PMH of colon cancer dx'd at age 16 ( per mother no longer resectable), substance use d/o, admitted following out of  hospital PEA arrest 2/2 Meth overdose which progressed to VT/Cardiogenic shock requiring VA Ecmo/IABP c/b probable anoxic brain injury and HALEIGH. Baseline creat unknown.     Per mother patient was diagnosed at age 16 with colon cancer. He had no surgery/treatment at that time. Moved to [place] 10 yrs ago and decided to pursue surgery. Two years ago he was told it was too late to have surgery ( per mother). Unclear who he is following with in [place] for his cancer.   Mother does not believe patient's drug overdose was a suicide attempt. He has been struggling with drug addiction for several years    PAST MEDICAL HISTORY:  Per Mother   Colon cancer   Substance use d/o    No past surgical history on file.     MEDICATIONS:  PTA Meds  Prior to Admission medications    Not on File      Current Meds    [START ON 4/10/2022] amiodarone  200 mg Oral Daily     amiodarone  400 mg Oral BID     chlorhexidine  15 mL Swish & Spit BID     pantoprazole (PROTONIX) IV  40 mg Intravenous Daily     piperacillin-tazobactam  2.25 g Intravenous Q6H     vancomycin place marin - receiving intermittent dosing  1 each Does not apply See Admin Instructions     Infusion Meds    dextrose 20 mL/hr at 04/03/22 1100     dextrose 10% Stopped (04/01/22 2100)     CRRT replacement solution       EPINEPHrine 0.06 mcg/kg/min (04/03/22 1100)     heparin (PRESSURE BAG) 2 unit/mL in 0.9% NaCl 6 Units/hr (04/03/22 1100)     HEParin 50 Units/hr (04/03/22 1200)     insulin regular Stopped (04/02/22 1010)     - MEDICATION INSTRUCTIONS -       norepinephrine 0.02 mcg/kg/min (04/03/22 1100)     CRRT replacement solution       CRRT replacement solution       vasopressin Stopped (04/03/22 1128)       ALLERGIES:    Not on File    REVIEW OF SYSTEMS:  Unable to obtain given intubation/sedation    SOCIAL HISTORY:   Social History     Socioeconomic History     Marital status: Single     Spouse name: Not on file     Number of children: Not on file     Years of education: Not on file  "    Highest education level: Not on file   Occupational History     Not on file   Tobacco Use     Smoking status: Not on file     Smokeless tobacco: Not on file   Substance and Sexual Activity     Alcohol use: Not on file     Drug use: Not on file     Sexual activity: Not on file   Other Topics Concern     Not on file   Social History Narrative     Not on file     Social Determinants of Health     Financial Resource Strain: Not on file   Food Insecurity: Not on file   Transportation Needs: Not on file   Physical Activity: Not on file   Stress: Not on file   Social Connections: Not on file   Intimate Partner Violence: Not on file   Housing Stability: Not on file     FAMILY MEDICAL HISTORY:   No family history on file.    PHYSICAL EXAM:   Temp  Av  F (35  C)  Min: 90.1  F (32.3  C)  Max: 98.6  F (37  C)  Arterial Line BP  Min: 69/45  Max: 102/62  Arterial Line MAP (mmHg)  Av.1 mmHg  Min: 56 mmHg  Max: 82 mmHg      Pulse  Av.4  Min: 57  Max: 94 Resp  Av.4  Min: 0  Max: 32  FiO2 (%)  Av.2 %  Min: 40 %  Max: 60 %  SpO2  Av.9 %  Min: 90 %  Max: 100 %       Pulse 91   Temp 98.4  F (36.9  C)   Resp 14   Ht 1.803 m (5' 11\")   Wt 86.1 kg (189 lb 13.1 oz)   SpO2 100%   BMI 26.47 kg/m     Date 22 0700 - 22 0659   Shift 3775-3367 6987-2348 6717-0401 24 Hour Total   INTAKE   I.V. 327.17   327.17   NG/GT 40   40   Shift Total(mL/kg) 367.17(4.26)   367.17(4.26)   OUTPUT   Urine 30   30   Emesis/NG output 0   0   Shift Total(mL/kg) 30(0.35)   30(0.35)   Weight (kg) 86.1 86.1 86.1 86.1      Admit Weight: 84.2 kg (185 lb 10 oz)     GENERAL APPEARANCE: sedated  Pulmonary: lungs diminished. On Vent   CV: On VA Ecmo/IABP   - Edema - minimal  GI: soft, nondistended.   MS: unable to assess  :  Lott w/stanislaw urine  SKIN: no rash, warm, dry, no cyanosis  NEURO: sedated    LABS:   I have reviewed the following labs:  CMP  Recent Labs   Lab 22  1156 22  0953 22  0952 " 04/03/22  0350 04/03/22  0349 04/03/22  0153 04/02/22  2354 04/02/22  2152 04/02/22  1651 04/02/22  1555 04/02/22  0356 04/02/22 0355   NA  --  146*  --   --  146*  --   --  148*  --  148*   < > 147*   POTASSIUM  --  6.2*  --   --  5.0  --   --  4.0  --  3.6   < > 2.9*   CHLORIDE  --  116*  --   --  117*  --   --  116*  --  114*   < > 113*   CO2  --  19*  --   --  21  --   --  19*  --  21   < > 20   ANIONGAP  --  11  --   --  8  --   --  13  --  13   < > 14   * 100*  95 100*   < > 95   < > 103* 118*   < > 122*  126*   < > 171*   BUN  --  65*  --   --  56*  --   --  50*  --  43*   < > 30   CR  --  4.37*  --   --  3.71*  --   --  3.19*  --  2.84*   < > 2.03*   GFRESTIMATED  --  17*  --   --  21*  --   --  25*  --  29*   < >  --    SAMIR  --  7.4*  --   --  8.0*  --   --  7.8*  --  7.2*   < > 7.3*   MAG  --  2.6*  --   --  2.7*  --  2.0 2.0  --  2.2   < > 2.5*   PHOS  --   --   --   --  6.2*  --  5.3*  --   --   --   --  2.4*   PROTTOTAL  --  4.8*  --   --  4.7*  --   --  4.6*  --  4.9*   < > 5.2*   ALBUMIN  --  2.0*  --   --  2.1*  --   --  2.0*  --  2.2*   < > 2.5*   BILITOTAL  --  1.6*  --   --  1.7*  --   --  1.7*  --  1.6*   < > 2.4*   ALKPHOS  --  74  --   --  83  --   --  88  --  106   < > 145   AST  --  1,891*  --   --  2,014*  --   --  2,116*  --  2,324*   < > 1,934*   ALT  --  1,306*  --   --  1,361*  --   --  1,428*  --  1,488*   < > 1,270*    < > = values in this interval not displayed.     CBC  Recent Labs   Lab 04/03/22  0953 04/03/22  0349 04/02/22 2152 04/02/22  1555   HGB 12.6* 13.0* 13.0* 13.8   WBC 21.5* 21.6* 21.0* 23.7*   RBC 3.87* 4.02* 4.04* 4.20*   HCT 38.0* 39.2* 39.2* 41.6   MCV 98 98 97 99   MCH 32.6 32.3 32.2 32.9   MCHC 33.2 33.2 33.2 33.2   RDW 14.4 14.0 13.9 13.8   * 143* 149* 189     INR  Recent Labs   Lab 04/03/22  0953 04/03/22 0349 04/02/22 2152 04/02/22  1555   INR 1.62* 1.65* 1.48* 1.49*   PTT 40* 39* 39* 52*     ABG  Recent Labs   Lab 04/03/22  1157 04/03/22  0953  04/03/22  0803 04/03/22  0600   PH 7.30* 7.29* 7.29* 7.30*   PCO2 39 40 39 40   PO2 128* 133* 133* 129*   HCO3 19* 19* 19* 20*   O2PER 40 40 40 40  50  40      URINE STUDIES  Recent Labs   Lab Test 04/01/22  1654   COLOR Colorless   APPEARANCE Clear   URINEGLC Negative   URINEBILI Negative   URINEKETONE Negative   SG 1.010   UBLD Large*   URINEPH 8.5*   PROTEIN Trace*   NITRITE Negative   LEUKEST Negative   RBCU 3*   WBCU 2     No lab results found.  PTH  No lab results found.  IRON STUDIES  No lab results found.    IMAGING:  Exam: XR CHEST PORT 1 VIEW, 4/3/2022 2:25 AM     Indication: Check endotracheal tube placement and ECLS cannula  placement. DO NOT log-roll patient.  Place film under patient using  patient safety handling process.     Comparison: 4/2/2022 , 4/1/2022     Findings:   Portable semiupright AP view of the chest. Endotracheal tube tip  projected over the lower thoracic trachea 2 cm proximal to lizabeth.  ECMO cannula projects over the high SVC. Partially visualized enteric  tubes.     Midline trachea, normal cardial mediastinal silhouette. No acute  pulmonary opacities, pleural effusion, or significant pneumothorax.  Markedly distended stomach stomach partially visualized.                                                                      Impression:  1. Endotracheal tube projects 2 cm proximal to lizabeth. ECMO cannula  projects over high SVC.  2. No acute cardiopulmonary abnormalities.     I have personally reviewed the examination and initial interpretation  and I agree with the findings.     MD Kesha REYNOLDS, ALTAGRACIA

## 2022-04-03 NOTE — PHARMACY-ADMISSION MEDICATION HISTORY
Admission Medication History Completed by Pharmacy    See [facility] Admission Navigator for allergy information, preferred outpatient pharmacy, prior to admission medications and immunization status.     Medication History Sources:     Spoke with significant other as patient is cannulated-> patient is not on any prescribed/OTC/herbal medications at home    Changes made to PTA medication list (reason):    Added: None    Deleted: None    Changed: None    Additional Information:    None    Prior to Admission medications    Not on File       Date completed: 04/03/22    Medication history completed by: Herb LucasD

## 2022-04-03 NOTE — PROGRESS NOTES
[facility] [place] [facility]  Cardiac ICU Progress Note  April 1, 2022         Subjective:   Remains intubated and sedated. RR changed down to 14 bpm from 18 due to pCO2 of 34 on ABG which improved to 40 subsequently. Rewarmed last night by midnight.           H&P:   Anonymous Yellow [place] is a 36 year old adult male who was admitted on 4/1/2022. Patient was brought by EMS who gave the following report. EMS was called at 12:54 pm to the patient's residence where he had suffered a presumed cardiac arrest after taking methamphetamine and DMT. Per EMS, CPR was administered by room mates. Unclear how long down time prior TO room mates administered CPR or to EMS call. On arrival, EMS noted patient to be in PEA arrest with CPR continued and patient was given total 4 doses of Epi, once of bicarb, one of calcium and 3 doses of Narcan (one ?inhaled, 2 IV). He was also shocked 4 times by EMS for periods of VFib electrical activity enroute to the hospital. Of note, ROSC was never achieved by EMS. On presentation to the Cath lab, patient was in asystole with DENI delivered CPRS continued. Initial blood gas showed a pH of 6.7, O2 of 36. He was cannulated for VA ECMO at 2:08 pm. Post cannulation patient was alternating between PEA and VF with additional 3 total 200 J shocks delivered. Post cannulation coronary angiogram showed clean coronary arteries. Epi, Norepi, Vaso and Phenylephrine was started at high doses and patient was given multiple boluses of Amiodarone. Additionally, an intra-aortic balloon pump and thermogard was placed with patient transported to CT for brain imaging prior to arriving to the ICU for continued management.      Witnessed arest (y/n): yes  Home or public location (y/n): home  Bystander CPR (y/n): yes  Time of 911 call: 12.54 PM  Initial rhythm: PEA  Did they have intermittent ROSC (y/n): no  # of shocks: by EMS: 4,by cath lab 3  Epi:   4  Amps  Amio: none in the field, 2 boluses in the  "cath lab   Bicarb:  1 amps  EtCO2 en route: unknwon  O2 sat en route:unknown, O2 on initial ABG on presentation was 36    Initial rhythm in the cath lab: PEA  First ABG: pH 6.7 CO2: 116  O2: 36  ECMO cannula size: 17 Fr to right femoral artery ? Fr to right femoral vein  Meds given in the cath lab: Epi, NorEpi, Vaso, Phenyl, Amio, 400 meq of sodium Bicarb  Initial PA catheter numbers: N/A           Past Medical History:   No past medical history on file.         Family History:   No family history on file.         Social History:     Social History     Socioeconomic History     Marital status: Single     Spouse name: Not on file     Number of children: Not on file     Years of education: Not on file     Highest education level: Not on file   Occupational History     Not on file   Tobacco Use     Smoking status: Not on file     Smokeless tobacco: Not on file   Substance and Sexual Activity     Alcohol use: Not on file     Drug use: Not on file     Sexual activity: Not on file   Other Topics Concern     Not on file   Social History Narrative     Not on file     Social Determinants of Health     Financial Resource Strain: Not on file   Food Insecurity: Not on file   Transportation Needs: Not on file   Physical Activity: Not on file   Stress: Not on file   Social Connections: Not on file   Intimate Partner Violence: Not on file   Housing Stability: Not on file            Medications:   I have reviewed this patient's current medications  No medications prior to admission.            Review of Systems:   Unable to obtain ROS due to pt's obtunded status            Physical Exam:   Vital signs:  Temp: 98.4  F (36.9  C) Temp src: Esophageal   Pulse: 91   Resp: 14 SpO2: 100 % O2 Device: Mechanical Ventilator   Height: 180.3 cm (5' 11\") Weight: 86.1 kg (189 lb 13.1 oz)  Estimated body mass index is 26.47 kg/m  as calculated from the following:    Height as of this encounter: 1.803 m (5' 11\").    Weight as of this encounter: " 86.1 kg (189 lb 13.1 oz).    GENERAL: intubated and sedated  HEENT: ET tube in place  CV: S1/S2 heard with IABP murmur  RESPIRATORY: distant breath sounds  GI: obese, soft, no bowel sounds on asucultation   EXTREMITIES: 1+ peripheral edema. 2+ bilateral pedal pulses.   NEUROLOGIC: not oriented to place or time, does not follow commands  MUSCULOSKELETAL: No joint swelling or tenderness.   SKIN: No jaundice. No acute rashes or lesions.             Data:   Labs pending    Recent Results (from the past 24 hour(s))   XR Chest Port 1 View    Narrative    Exam: XR CHEST PORT 1 VIEW, 4/3/2022 2:25 AM    Indication: Check endotracheal tube placement and ECLS cannula  placement. DO NOT log-roll patient.  Place film under patient using  patient safety handling process.    Comparison: 4/2/2022 , 4/1/2022    Findings:   Portable semiupright AP view of the chest. Endotracheal tube tip  projected over the lower thoracic trachea 2 cm proximal to lizabeth.  ECMO cannula projects over the high SVC. Partially visualized enteric  tubes.    Midline trachea, normal cardial mediastinal silhouette. No acute  pulmonary opacities, pleural effusion, or significant pneumothorax.  Markedly distended stomach stomach partially visualized.      Impression    Impression:  1. Endotracheal tube projects 2 cm proximal to lizabeth. ECMO cannula  projects over high SVC.  2. No acute cardiopulmonary abnormalities.    I have personally reviewed the examination and initial interpretation  and I agree with the findings.    ALEXANDER VILLELA MD         SYSTEM ID:  [id number]              Assessment and Plan:   Changes today:  -Initiating CRRT per Nephrology given minimal UOP and K of 6.2  -Had 20-25mmHg native pulsatility which was reassuring in terms of cardiac function  -Will attempt to reduce ECMO flow to 3.8-4.0 with plans for turndown tomorrow  -Attempt to wean off vaso as able  -Trial off sedation today  -Started trickle feeds today. Nutrition consult  placed  -Switch from IV to PO Amio taper  -Will need to monitor platelets closely since it is slowly downtrending    Neurology: # Concern for Anoxic Brain injury  # Possible Encephalopathy  Intubated, sedated, paralyzed.   Cooled to 34 degrees. CT Head (4/1) unremarkable. Completely warmed on 4/2 night (11pm-midnight).    -Attempt trial off sedation today AM  - High concern for anoxic brain injury given persistently low O2 sats during field CPR and PAO2 of 36 on arrival. Guarded prognosis in terms of neurologic recovery at this point.  -Neurocrit following, appreciate recs   Cardiovascular / Hemodynamics: # PEA arrest complicated by VF  # Cardiogenic shock with profound vasoplegia and hypotension  # Drug induced cardiac arrest  #ECMO Cannulation (4/1)  #IABP Placement (4/1)  No CAD on coronary angiogram.  Peripheral V-A ECMO inserted for cardiopulmonary support. IABP insert for maximal support.  TTE (4/2): LVEF 5-10% with partially opening AV. 4/3 had 20-25mmHg native pulsatility. Hence will attempt to come down on ECMO flow to 3.8-4.0 and plan for turndown tomorrow. Meanwhile will attempt to come off vaso.  --Switch from IV amio to PO amio with taper (400mg BID for 1 week and then 200mg BID x 1 week then 200mg daily x week then off)  --wean pressors/inotropes as able  --continue ASA 81mg and ticagrelor 90mg BID  --ACT goal 180-200  --hold lipitor for now given likely hepatic injury during arrest  --hold ACE/ARB for now given likely reduced renal fxn after arrest  --holding beta blocker given shock      Pulmonary: # Acute Hypoxic Respiratory failure  ETT in place at ~2 cm above the lizabeth.    Vent Mode: CMV/AC  (Continuous Mandatory Ventilation/ Assist Control)  FiO2 (%): 40 %  Resp Rate (Set): 14 breaths/min  Tidal Volume (Set, mL): 420 mL  PEEP (cm H2O): 5 cmH2O  Resp: 14    CXR: Lines in stable position. Remarkably clear. Currently low suspicion for aspiration   --Permissive hypercapnia with CO2 target  40-50  --wean vent as able  --daily CXR  --Q2h ABGs for now  --consider scheduled duonebs if signs of lung dz, currently PRN     GI and Nutrition: # Shock liver secondary to cardiac arrest  No known medical hx.   --monitor BID LFTs  --Initiate trickle feeds. Nutrition consult placed  --bowel regimen - on hold for now due to hypothermia  --GI Prophylaxis: PPI    Renal, Fluid and Electrolytes: #Acute Renal Failure  # Severe lactic acidosis  #Hypernatremia  #Hyperkalemia  Nephrology consulted.   --Plan for CRRT initiation (4/3)  --monitor urine output  --maintain K~3 and Mg>2    Infectious Disease: # Possible aspiration penumonia  No signs of infection. Leukocytosis c/w arrest. Blood cultures collected. Sputum culture (4/1) unremarkable.  --vancomycin/zosyn x5 days for ECMO (4/1-4/5)  --daily blood cultures  --monitor for signs of infection given cooling, lines, and leukocytosis   Hematology and Oncology: # Possible Coagulopathy  Received heparin while in the cath lab  Will continue heparin for ECMO to maintain ACT goal.    --cryo PRN fibrinogen < 200; FFP for INR >2  --Transfuse for Hgb<10  --heparin gtt for ECMO with ACT goal 180-200  --DVT PPX: Heparin as above   Endocrinology: --HgbA1c 5.3   --On insulin gtt   Lines:   PA catheter April 1, 2022  R femoral arterial and venous ECMO cannulae April 1, 2022  L femoral arterial line April 1, 2022  R radial arterial line April 1, 2022  ETT April 1, 2022  Lott catheter April 1, 2022  OG tube April 1, 2022  Restraint: needed    Current lines are required for patient management       Family update by me today: Yes   Mother, Dayana was updated by me. She lived in [place]. Contact number: [phone].   Per mother, patient has an aunt, Teri Jiménez,  who lives in [place], contact number: [phone]    Code Status: Full confirmed with mother on the phone.       The Pt was discussed and evaluated with Dr. Jerod MD, attending physician, who agrees with the assessment and  plan above.     Feliberto Bucio MD  Cardiology Fellow

## 2022-04-03 NOTE — PHARMACY
Pharmacy Tube Feeding Consult    Medication reviewed for administration by feeding tube and for potential food/drug interactions.    Recommendation: No changes are needed at this time.     Pharmacy will continue to follow as new medications are ordered.    Kathy Mckeon, HerbD

## 2022-04-03 NOTE — PHARMACY-CONSULT NOTE
Pharmacy Tube Feeding Consult    Medication reviewed for administration by feeding tube and for potential food/drug interactions.    Recommendation: No changes are needed at this time.     Pharmacy will continue to follow as new medications are ordered.    Steve Lopez, PharmD, BCCCP

## 2022-04-03 NOTE — PROGRESS NOTES
ECMO Shift Summary:      ECMO Equipment:  Console serial number: [id number]  Circuit Lot number: [id number]  Oxygenator Lot number: [id number]      Patient remains on VA ECMO, all equipment is functioning and alarms are appropriately set. RPM's 3600 with flow range 4.07-4.39 L/min. Sweep gas is at 1.5 LPM and FiO2 50%. Circuit remains free of air and clot, fibrin. Cannulas are secure with no bleeding from site. Extremities are warm and well perfused.    Significant Shift Events: ECMO flow lowered this am (@ 1015) in rounds, aim for flows around 4 LPM and as tolerated per Dr Newsome.Heparin on very low dose to keep -200 and turned off. sedation and pain medication turned off at 1015. CRRT initiated for increased K+ and low urine output.    Vent settings:  Vent Mode: CMV/AC  (Continuous Mandatory Ventilation/ Assist Control)  FiO2 (%): 40 %  Resp Rate (Set): 14 breaths/min  Tidal Volume (Set, mL): 420 mL  PEEP (cm H2O): 5 cmH2O  Resp: 14  .    Heparin is not running. ACT range 190-207.    Urine output is nil and CRRT started, blood loss was none. Product given included none.       Intake/Output Summary (Last 24 hours) at 4/3/2022 1641  Last data filed at 4/3/2022 1600  Gross per 24 hour   Intake 2512.14 ml   Output 431 ml   Net 2081.14 ml       ECHO:  No results found for this or any previous visit.  No results found for this or any previous visit.      CXR:    Plan is to remain on VA ECMO support, turndown planned for tomorrow and possible CT.      Megan Allen, RT  ECMO Specialist  4/3/2022 4:41 PM

## 2022-04-03 NOTE — PROGRESS NOTES
"Physician Attestation   I, Allie Hernandez, saw and evaluated Michael Callejas as part of a shared visit.  I have reviewed and discussed with the advanced practice provider their history, physical and plan.    I personally reviewed the vital signs, medications, labs, and imaging.    Subjective: The patient is a 36-year-old male with unknown medical history who has out of hospital cardiac arrest at home after taking methamphetamine and DMT.  CPR was initiated by roommate but unclear downtime.  When EMS arrived he was given Narcan and epinephrine.  Subsequent rhythm switch to V. fib and he was then shocked.  The patient never achieved Ross and was transfer to [facility].  He was then cannulated on VA ECMO since 4/1/22.  Upon presentation his creatinine was 1.19 which increased to 2.84 yesterday and today at 4.37.  He is now on 3 pressors patient developed oliguria.  Since the first day of admission.  Yesterday his urine output was only 476 and he was net positive almost 3 L.  Since midnight he only made 90 mL and they just give him Lasix 40 mg but he does not seem to have good response to it.  His potassium is however 6.2 at 10 AM so nephrology is consulted.  He also has hypernatremia and wide anion gap metabolic acidosis.  BUN is 65 and creatinine is 4.37.  Patient is also noted to have ischemic hepatitis with AST ALT in the thousands.  His troponin is extremely high at the level of more than 125,000.  His sedation was turned off 1 hour before we saw the patient but patient is still not awake nor following commands.    10 reviewed of systems are negative except as mentioned above.   Objective:  Pulse 91   Temp 98.4  F (36.9  C)   Resp 14   Ht 1.803 m (5' 11\")   Wt 86.1 kg (189 lb 13.1 oz)   SpO2 100%   BMI 26.47 kg/m      Intake/Output Summary (Last 24 hours) at 4/3/2022 1154  Last data filed at 4/3/2022 1153  Gross per 24 hour   Intake 2476.39 ml   Output 301 ml   Net 2175.39 ml       My key history or physical " "exam findings:   GA: Intubated and sedated.  ECMO cannulated.   Heart: Tachycardic  Lung: Crackles in both lungs.  Abdomen: Soft nontender  MSK: No pitting edema    Problem list &Plan  # HALEIGH stage 3, likely in the setting of ischemic ATN from prolonged cardiac arrest and hypotension afterwards  # Shock; on 3 pressors and now ECMO  # Severe cardiomyopathy; EF 5-10% on Echo 4/2/22  # Cardiac injury post arrest  # PEA and VFib arrest likely from Meth and DMT use  # Progressive volume overload   # Hyperkalemia  # Ischemic hepatitis  Etiology of his acute kidney injury likely related to ischemic ATN from prolonged cardiac arrest and hypotension requiring pressors on ECMO.  Ordered patient has mild microscopic hematuria, this is in the setting of Lott placement.  My suspicion for GN is low.  We do not have any kidney imaging so we would prefer to get an ultrasound of kidney at least one time to make sure that his anatomy is normal.    Regarding management, the patient was receiving Lasix 40 mg this morning without good response.  We can certainly give higher dose but the chance that he is going to respond is likely less given profound hypotension on 3 pressors.  Given that the patient has progressive hyperkalemia, I think it is reasonable to start the patient on CRRT utilizing 4K bath with the goal of fluid pull I=O.  We can run CRRT via ECMO circuit.    I spent 60 minutes face-to-face and/or coordinating care. Over 50% of our time on the unit was spent counseling the patient and/or coordinating care regarding diagnosis, laboratory/imaging results treatment plan.\"     Allie Hernandez  Date of Service (when I saw the patient): April 3, 2022    "

## 2022-04-04 NOTE — PROGRESS NOTES
[facility]  Cardiac ICU Progress Note  April 1, 2022         Subjective:   Reviewed events from last 24 hours.  Remains intubated. CT Head showing cerebral edema overnight and started on hypertonic saline. Off sedation since yesterday 10AM with no response.           H&P:   Mr Michael Callejas is a 36 year old adult male who was admitted on 4/1/2022. Patient was brought by EMS who gave the following report. EMS was called at 12:54 pm to the patient's residence where he had suffered a presumed cardiac arrest after taking methamphetamine and DMT. Per EMS, CPR was administered by room mates. Unclear how long down time prior TO room mates administered CPR or to EMS call. On arrival, EMS noted patient to be in PEA arrest with CPR continued and patient was given total 4 doses of Epi, once of bicarb, one of calcium and 3 doses of Narcan (one ?inhaled, 2 IV). He was also shocked 4 times by EMS for periods of VFib electrical activity enroute to the hospital. Of note, ROSC was never achieved by EMS. On presentation to the Cath lab, patient was in asystole with DENI delivered CPRS continued. Initial blood gas showed a pH of 6.7, O2 of 36. He was cannulated for VA ECMO at 2:08 pm. Post cannulation patient was alternating between PEA and VF with additional 3 total 200 J shocks delivered. Post cannulation coronary angiogram showed clean coronary arteries. Epi, Norepi, Vaso and Phenylephrine was started at high doses and patient was given multiple boluses of Amiodarone. Additionally, an intra-aortic balloon pump and thermogard was placed with patient transported to CT for brain imaging prior to arriving to the ICU for continued management.      Witnessed arest (y/n): yes  Home or public location (y/n): home  Bystander CPR (y/n): yes  Time of 911 call: 12.54 PM  Initial rhythm: PEA  Did they have intermittent ROSC (y/n): no  # of shocks: by EMS: 4,by cath lab 3  Epi:   4  Amps  Amio: none in the field,  "2 boluses in the cath lab   Bicarb:  1 amps  EtCO2 en route: unknwon  O2 sat en route:unknown, O2 on initial ABG on presentation was 36    Initial rhythm in the cath lab: PEA  First ABG: pH 6.7 CO2: 116  O2: 36  ECMO cannula size: 17 Fr to right femoral artery ? Fr to right femoral vein  Meds given in the cath lab: Epi, NorEpi, Vaso, Phenyl, Amio, 400 meq of sodium Bicarb  Initial PA catheter numbers: N/A           Past Medical History:   No past medical history on file.         Family History:   No family history on file.         Social History:     Social History     Socioeconomic History     Marital status: Single     Spouse name: Not on file     Number of children: Not on file     Years of education: Not on file     Highest education level: Not on file   Occupational History     Not on file   Tobacco Use     Smoking status: Not on file     Smokeless tobacco: Not on file   Substance and Sexual Activity     Alcohol use: Not on file     Drug use: Not on file     Sexual activity: Not on file   Other Topics Concern     Not on file   Social History Narrative     Not on file     Social Determinants of Health     Financial Resource Strain: Not on file   Food Insecurity: Not on file   Transportation Needs: Not on file   Physical Activity: Not on file   Stress: Not on file   Social Connections: Not on file   Intimate Partner Violence: Not on file   Housing Stability: Not on file            Medications:   I have reviewed this patient's current medications  No medications prior to admission.            Review of Systems:   Unable to obtain ROS due to pt's obtunded status            Physical Exam:   Vital signs:  Temp: 98.2  F (36.8  C) Temp src: Esophageal   Pulse: 87   Resp: 14 SpO2: 99 % O2 Device: Mechanical Ventilator   Height: 180.3 cm (5' 11\") Weight: 87.5 kg (192 lb 14.4 oz)  Estimated body mass index is 26.9 kg/m  as calculated from the following:    Height as of this encounter: 1.803 m (5' 11\").    Weight as of " this encounter: 87.5 kg (192 lb 14.4 oz).       GENERAL: intubated and sedated  HEENT: ET tube in place  CV: S1/S2 heard with IABP murmur  RESPIRATORY: distant breath sounds  GI: obese, soft, no bowel sounds on asucultation   EXTREMITIES: 1+ peripheral edema. 2+ bilateral pedal pulses.   NEUROLOGIC: not oriented to place or time, does not follow commands  MUSCULOSKELETAL: No joint swelling or tenderness.   SKIN: No jaundice. No acute rashes or lesions.             Data:   Labs pending    Recent Results (from the past 24 hour(s))   CT Head w/o Contrast    Impression    RESIDENT PRELIMINARY INTERPRETATION  Impression:  Diffuse cerebral edema with preserved gray-white differentiation.     Findings were discussed with Dr. Welch by Dr. Devine via telephone at  5:34 AM.              Assessment and Plan:   Changes today:  -Keep off sedation and keep assessing neurologic status  -Hypertonic saline futile for cerebral edema in setting of CRRT. Will discuss other options if any  -Plan for ECMO turn down today  -CRRT per Nephrology  -Off pressors today  -TF per Nutrition  -Will need to monitor platelets closely since it is slowly downtrending  -Will place PICC and take out Thermoguard sheath today    Neurology: # Cerebral Edema  # Concern for Anoxic Brain injury  # Possible Encephalopathy  Intubated, sedated, paralyzed.   Cooled to 34 degrees. CT Head (4/1) unremarkable. Completely warmed on 4/2 night (11pm-midnight).    - Continue to monitor neuro status off sedation  - Not on hypertonic saline for cerebral edema since pt is on CRRT and will not achieve hypernatremia  - High concern for anoxic brain injury given persistently low O2 sats during field CPR and PAO2 of 36 on arrival. Guarded prognosis in terms of neurologic recovery at this point.  -Neurocrit following, appreciate recs   Cardiovascular / Hemodynamics: # PEA arrest complicated by VF  # Cardiogenic shock with profound vasoplegia and hypotension  # Drug induced  cardiac arrest  #ECMO Cannulation (4/1)  #IABP Placement (4/1)  No CAD on coronary angiogram.  Peripheral V-A ECMO inserted for cardiopulmonary support. IABP insert for maximal support.  TTE (4/2): LVEF 5-10% with partially opening AV.   Plan for turndown today.   -Off genevieve/pressors 4/4  --PO amio with taper (400mg BID for 1 week and then 200mg BID x 1 week then 200mg daily x week then off)  --continue ASA 81mg and ticagrelor 90mg BID  --ACT goal 180-200  --hold lipitor for now given likely hepatic injury during arrest  --hold ACE/ARB for now given likely reduced renal fxn after arrest  --holding beta blocker given shock      Pulmonary: # Acute Hypoxic Respiratory failure  ETT in place at ~2 cm above the lizabeth.    Vent Mode: CMV/AC  (Continuous Mandatory Ventilation/ Assist Control)  FiO2 (%): 40 %  Resp Rate (Set): 14 breaths/min  Tidal Volume (Set, mL): 420 mL  PEEP (cm H2O): 5 cmH2O  Resp: 14    CXR: Lines in stable position. Remarkably clear. Currently low suspicion for aspiration   --Permissive hypercapnia with CO2 target 40-50  --wean vent as able  --daily CXR  --Q2h ABGs for now  --consider scheduled duonebs if signs of lung dz, currently PRN     GI and Nutrition: # Shock liver secondary to cardiac arrest  No known medical hx.   --monitor BID LFTs  --Tube feeds at goal  --bowel regimen - on hold for now due to hypothermia  --GI Prophylaxis: PPI    Renal, Fluid and Electrolytes: #Acute Renal Failure  # Severe lactic acidosis  #Hypernatremia  #Hyperkalemia  Nephrology consulted.   --CRRT initiated (4/3)  --monitor urine output  --maintain K~3 and Mg>2    Infectious Disease: # Possible aspiration penumonia  No signs of infection. Leukocytosis c/w arrest. Blood cultures collected. Sputum culture (4/1) unremarkable.  --vancomycin/zosyn x5 days for ECMO (4/1-4/5)  --daily blood cultures  --monitor for signs of infection given cooling, lines, and leukocytosis   Hematology and Oncology: # Possible  Coagulopathy  Received heparin while in the cath lab  Will continue heparin for ECMO to maintain ACT goal.    --cryo PRN fibrinogen < 200; FFP for INR >2  --Transfuse for Hgb<10  --heparin gtt for ECMO with ACT goal 180-200  --DVT PPX: Heparin as above   Endocrinology: --HgbA1c 5.3   --On insulin gtt   Lines:   PA catheter April 1, 2022  R femoral arterial (17Fr) and venous (25 Fr)ECMO cannulae April 1, 2022  L femoral arterial line (IABP) April 1, 2022  R radial arterial line April 1, 2022  ETT 7.5mm April 1, 2022  Lott catheter April 1, 2022  OG tube April 1, 2022  Restraint: needed    Current lines are required for patient management       Family update by me today: Yes   Mother, Dayana was updated by me. She lives in [place]. Contact number: [phone].   Per mother, patient has an aunt, Teri Jiménez,  who lives in [place], contact number: [phone]    Code Status: Full confirmed with mother on the phone.       The Pt was discussed and evaluated with Dr. Jennifer MD, attending physician, who agrees with the assessment and plan above.     Feliberto Bucio MD  Cardiology Fellow

## 2022-04-04 NOTE — PROGRESS NOTES
CRRT STATUS NOTE    DATA:  Time:  1731  Pressures WNL:  YES  Filter Status:  WDL    Problems Reported/Alarms Noted:  None    Supplies Present:  YES    ASSESSMENT:  Patient Net Fluid Balance: +840cc since midnight      Vital Signs:  Temp: 98.2  F (36.8  C) Temp src: Esophageal   Pulse: 86   Resp: 12 SpO2: 99 % O2 Device: Mechanical Ventilator            Labs: K 4.5, Mag 2.1, Phos 4.4, WBC 18.5, Hgb 9.7, Plt 70, INR 1.23              Goals of Therapy: I=O, if tolerated could aim for net neg 0.5L/24h     INTERVENTIONS:   None this shift    PLAN:  Continue with plan of care. Contact CRRT resource with any questions or concerns.

## 2022-04-04 NOTE — PROCEDURES
[facility]    Triple Lumen PICC Placement    Date/Time: 4/4/2022 6:25 PM  Performed by: Nathan Card RN  Authorized by: Feliberto Bucio MD   Indications: vascular access (Antibiotics)      UNIVERSAL PROTOCOL   Site Marked: Yes  Prior Images Obtained and Reviewed:  Yes  Required items: Required blood products, implants, devices and special equipment available    Patient identity confirmed:  Verbally with patient, arm band, provided demographic data, hospital-assigned identification number and anonymous protocol, patient vented/unresponsive  NA - No sedation, light sedation, or local anesthesia  Confirmation Checklist:  Patient's identity using two indicators, relevant allergies, procedure was appropriate and matched the consent or emergent situation and correct equipment/implants were available  Time out: Immediately prior to the procedure a time out was called    Universal Protocol: the Joint Commission Universal Protocol was followed    Preparation: Patient was prepped and draped in usual sterile fashion       ANESTHESIA    Anesthesia: Local infiltration  Local Anesthetic:  Lidocaine 1% without epinephrine  Anesthetic Total (mL):  0      SEDATION    Patient Sedated: No        Preparation: skin prepped with 2% chlorhexidine  Skin prep agent: skin prep agent completely dried prior to procedure  Sterile barriers: maximum sterile barriers were used: cap, mask, sterile gown, sterile gloves, and large sterile sheet  Hand hygiene: hand hygiene performed prior to central venous catheter insertion  Type of line used: Power PICC  Catheter type: triple lumen  Lumen type: non-valved  Catheter size: 5 Fr  Brand: Bard  Lot number: [id number]  Placement method: venipuncture, MST, ultrasound, tip confirmation system and other (see comment) (Placement verified by Ches X-Ray.PICC okay to use.)  Number of attempts: 1  Successful placement: yes  Orientation: left  Location: basilic vein  (0.48cm)  Arm circumference: adults 10 cm  Extremity circumference: 28  Visible catheter length: 5  Total catheter length: 54  Dressing and securement: alcohol impregnated caps, blood cleaned with CHG, blood removed, chlorhexidine patch applied, fixation device, secure lock, line secured, site cleaned and securement device  Post procedure assessment: blood return through all ports, free fluid flow and placement verified by x-ray  PROCEDURE   Patient Tolerance:  Patient tolerated the procedure well with no immediate complicationsDescribe Procedure: Placement verified by Chest X-Ray.PICC okay to use.

## 2022-04-04 NOTE — PROGRESS NOTES
Major Shift Events:  Remains intubated, sedation off since 10 am yesterday, no response to stimuli. Pressors off. VA ECMO continued, bedside turndown completed, see CSI note. IABP, 1:1, 100%. CRRT, I=O, +800 mL since midnight.   Plan: Continue with plan of care.   For vital signs and complete assessments, please see documentation flowsheets.

## 2022-04-04 NOTE — PLAN OF CARE
Shift Summary [id number]    Major Shift Events:  Pt remains intubated, sedation remains off, pt unresponsive, PERRLA, normothermia continues. CT head completed, due to increase in cerebral edema 3% gtt started. ECMO continues w/flows 4, speed 3600, Sweep 1.5, FIO2 50%, IABP remains 1:1, 30pt pulsatility noted t/o night.  Epi and Levo remains for hemodynamic support. 14/450/5/40% tolerating well. Lott; pt anuric, provider aware, no BM during shift. TF continued to progress t/o night see flowsheets. No new skin issues present during shift.     CRRT continues; goal I=O, able to progress to pull 50.       Plan: continue w/current support  For vital signs and complete assessments, please see documentation flowsheets.

## 2022-04-04 NOTE — PROGRESS NOTES
ECMO Shift Summary: [id number]    ECMO Equipment:  Console serial number: [id number]  Circuit Lot number: [id number]  Oxygenator Lot number: [id number]    Patient remains on VA ECMO, all equipment is functioning and alarms are appropriately set. RPM's 3600 with flow range 3.97-4.12 L/min. Sweep gas is at 1.5 LPM and FiO2 50%. Circuit remains free of air, but there is a small amt of clot and fibrin at the connectors. Cannulas are secure with no bleeding from site. Extremities are warm.    Significant Shift Events: ECHO with ECMO turndown was successful at a flow of 1 lpm. See MD note.    Vent settings:  Vent Mode: CMV/AC  (Continuous Mandatory Ventilation/ Assist Control)  FiO2 (%): 40 %  Resp Rate (Set): 12 breaths/min  Tidal Volume (Set, mL): 420 mL  PEEP (cm H2O): 5 cmH2O  Resp: 13  .    No heparin currently running. ACT range is 182-203s.    Urine output is per RN flowsheet, blood loss was minimal. Product given included none.       Intake/Output Summary (Last 24 hours) at 4/4/2022 1810  Last data filed at 4/4/2022 1800  Gross per 24 hour   Intake 2605.07 ml   Output 1327 ml   Net 1278.07 ml       ECHO:  No results found for this or any previous visit.  No results found for this or any previous visit.      CXR:  Recent Results (from the past 24 hour(s))   XR Chest Port 1 View    Narrative    EXAMINATION:  XR CHEST PORT 1 VIEW 4/4/2022 1:00 AM.    COMPARISON: 4/3/2022    HISTORY:  Check endotracheal tube placement and ECLS cannula  placement. DO NOT log-roll patient.  Place film under patient using  patient safety handling process.    FINDINGS: Frontal view. Unchanged endotracheal tube, ECMO cannula,  IABP, temperature probe, gastric tube. Cardiac silhouette is normal.  Clear lungs.      Impression    IMPRESSION: Stable support devices.    I have personally reviewed the examination and initial interpretation  and I agree with the findings.    REGIS ORLANDO MD         SYSTEM ID:  [id number]   CT Head w/o  Contrast    Narrative    CT HEAD W/O CONTRAST 2022 5:25 AM    History: Neuro deficit, acute, stroke suspected     Comparison: 2022    Technique: Using multidetector thin collimation helical acquisition  technique, axial, coronal and sagittal CT images from the skull base  to the vertex were obtained without intravenous contrast.   (topogram) image(s) also obtained and reviewed.    Findings:  Image quality is mildly degraded by beam hardening artifact from EEG  wires. No acute intracranial hemorrhage or midline shift. Diffuse  cerebral edema with near complete sulcal effacement. Decreased caliber  of the ventricles compared to prior exam. Crowding of the basal  cisterns. Gray-white differentiation appears preserved.    Mucosal thickening of the maxillary, frontal sinuses and ethmoid air  cells. Mastoid air cells are clear. No calvarial fracture. Persistent  metopic suture.      Impression    Impression:  New cerebral edema with preserved gray-white  differentiation.     Findings were discussed with Dr. Welch by Dr. Devine via telephone at  5:34 AM.    I have personally reviewed the examination and initial interpretation  and I agree with the findings.    PUJA TAY MD         SYSTEM ID:  [id number]   Echocardiogram Limited    Narrative    [id number]  [id number]  [id number]  [id number]^PAULINA^DONNA     [facility],[place]  Echocardiography Laboratory  [address] [place] [address]     Name: TIERNEY CARVALHO  MRN: [id number]  : 1985  Study Date: 2022 03:20 PM  Age: 36 yrs  Gender: Male  Patient Location: [facility]  Reason For Study: Cardiac Arrest  Ordering Physician: DONNA GALLARDO  Performed By: Lawanda Odom RDCS     BSA: 2.1 m2  Height: 71 in  Weight: 192 lb  HR: 88  BP: 103/60 mmHg  ______________________________________________________________________________  Procedure  Limited Portable Echo  Adult.  ______________________________________________________________________________  Interpretation Summary  Limited VA ECMO turndown study.  Baseline (4.0 L/min+IABP): Small LV cavity size (LVIDd 4.3 cm) and mildly  reduced LV function, LVEF=40-45%. RV size and function are grossly normal on  limited views. Septum midline.The aortic valve opens with each cardiac cycle.     With turndown to 1.0 L/min and IABP paused, LVEF improves to 55-60%. LV cavity  size and septal position do not change significantly. The aortic valve opens  with each cardiac cycle at all flow rates.  This study was compared with the study from 4/2/2022: Baseline LVEF has  improved dramatically and aortic valve now opens; LVEF improves further with  turndown and pausing the intra-aortic balloon pump.  ______________________________________________________________________________  Aortic Valve  The aortic valve opens at all pump speeds.     Vessels  Venous ECMO cannula is seen traversing the IVC and RA.     Pericardium  No pericardial effusion is present.     Compared to Previous Study  This study was compared with the study from 4/2/2022 . Baseline LVEF has  improved dramatically and aortic valve now opens; LVEF improves further with  turndown and pausing the intra-aortic balloon pump.     ______________________________________________________________________________  Report approved by: Jacki Hernandez 04/04/2022 04:15 PM     ______________________________________________________________________________          Labs:  Recent Labs   Lab 04/04/22  1750 04/04/22  1601 04/04/22  1546 04/04/22  1430 04/04/22  1209   PH 7.39  --  7.39 7.38 7.41   PCO2 40  --  41 41 38   PO2 94  --  98 109* 102   HCO3 24  --  25 25 24   O2PER 40 40  50 40 40 40       Lab Results   Component Value Date    HGB 9.7 (L) 04/04/2022    PHGB 40 (H) 04/04/2022    PLT 70 (L) 04/04/2022    FIBR 607 (H) 04/04/2022    INR 1.23 (H) 04/04/2022    PTT 44 (H) 04/04/2022     DD 6.09 (H) 04/04/2022    ANTCH 41 (L) 04/04/2022         Plan is to continue on VA ECMO.      Ayse Bull, RT  ECMO Specialist  4/4/2022 6:10 PM

## 2022-04-04 NOTE — CONSULTS
[facility]  Palliative Care Consultation Note    Patient: Michael Callejas  Date of Admission:  4/1/2022    Requesting Clinician / Team: Silvana Ca APRN CNP  Reason for consult: Goals of care    Recommendations:    Spoke with Tamika- significant other today and offered support    Tamika is driving patients mother and brother in to the hospital today from the airport    Tamika mentions that he has colon cancer but didn't want a colostomy, wonder if there are any other medical records for him    Our team will continue to follow and provide support to patient/family and are available to help with GOC discussions as needed    Appreciate PCSW involvement for support of family    These recommendations have been discussed with primary team.      Thank you for the opportunity to participate in the care of this patient and family. Our team: will continue to follow.     During regular M-F work hours -- if you are not sure who specifically to contact -- please contact us by sending a text page to our team consult pager at [phone].    After regular work hours and on weekends/holidays, you can call our answering service at [phone]. Also, who's on call for us is available in [employer].       Assessments:  Michael Callejas is a 36 year old male who presented via EMS with cardiac arrest after taking methamphetamine and DMT that has been complicated by cardiogenic shock on VA ECMO, anoxic brain injury, respiratory failure, transaminitis, shock liver, HALEIGH, and aspiration PNA.     Today, the patient was seen for:  Cardiogenic shock  Concern for anoxic brain injury  Shock liver  HALEIGH  Aspiration PNA    Prognosis, Goals, & Planning:      Functional Status just prior to hospitalization: 0 (Fully active, able to carry on all activities without restriction)      Prognosis, Goals, and/or Advance Care Planning were addressed today: No        Summary/Comments:       Patient's  decision making preferences: unable to assess          Patient has decision-making capacity today for complex decisions: No            I have concerns about the patient/family's health literacy today: Yes           Patient has a completed Health Care Directive: No.       Code status: Full Code    Coping, Meaning, & Spirituality:   Mood, coping, and/or meaning in the context of serious illness were addressed today: Yes  Summary/Comments: spoke with Tamika- significant other today, she reports that there are lots of stressors and despite those that she is doing ok and trying to keep busy. Sounds like her son went into the hospital this week for overdose but is doing ok. She also reports that she is having to move all their stuff also. She reports that Michael has not seen him mother in 10 years.     Social:     Living situation: unclear, Tamika mentions that they have had to move out of their current location    Key family / caregivers: mother, father, brother, significant other- Tamika, 3 children (youngest is 2 months old), Kacey is the children's mother.    Substance use history: yes    History of Present Illness:  History gathered today from: medical chart    Michael Callejas is a 36 year old male who presented via EMS with cardiac arrest after taking methamphetamine and DMT that has been complicated by cardiogenic shock on VA ECMO, anoxic brain injury, respiratory failure, transaminitis, shock liver, HALEIGH, and aspiration PNA.     Palliative care consulted 4/1 for goals of care    Key Palliative Symptom Data:  We are not helping to manage these symptoms currently in this patient.        ROS:  Comprehensive ROS is reviewed and is negative except as here & per HPI:      Past Medical History:  No past medical history on file.     Past Surgical History:  Past Surgical History:   Procedure Laterality Date     CV CORONARY ANGIOGRAM N/A 4/1/2022    Procedure: Coronary Angiogram;  Surgeon: Papito Devi MD;  Location:   [facility]     CV EXTRACORPERAL MEMBRANE OXYGENATION N/A 4/1/2022    Procedure: Extracorporeal Membrance Oxygenation;  Surgeon: Papito Devi MD;  Location:  [facility]     CV INTRA AORTIC BALLOON N/A 4/1/2022    Procedure: Intraprocedure Aortic Balloon Pump Insertion;  Surgeon: Papito Devi MD;  Location: [facility]         Family History:  No family history on file.      Allergies:  Not on File     Medications:  I have reviewed this patient's medication profile and medications from this hospitalization.     Physical Exam:  Vital Signs: Temp: 98.2  F (36.8  C) Temp src: Esophageal   Pulse: 87   Resp: 17 SpO2: 98 % O2 Device: Mechanical Ventilator    Weight: 192 lbs 14.44 oz    Constitutional: intubated, sedated, no apparent distress  ENT: Normocephalic, without obvious abnormality, atramatic.  Lungs: No increased work of breathing, good air exchange on ventilator  Cardiovascular: VA ECMO  Musculoskeletal: No redness, warmth, or swelling of the joints.   Neurologic: sedated   Skin: No rashes, erythema    Data reviewed:  Recent imaging reviewed, my comments on pertinents:   CT chest abdomen 4/1  IMPRESSION:   1. Endotracheal tube tip in the low thoracic trachea. Venous ECMO   cannula terminates in the right brachiocephalic vein. IABP superior   marker at the level of the proximal descending thoracic aorta. Other   support devices detailed above.   2. Multifocal groundglass opacities with superimposed consolidative   opacities in the left lower and upper lobes. May represent aspiration   vs. infection, particularly given endobronchial debris/mucus plugging   in the left greater than right lower lobes and lingula   3. Nondisplaced midsternal fracture with a small amount of   retrosternal hemorrhage. Nondisplaced anterior right fourth rib   fracture. No pneumothorax.   4. Edematous pancreas with peripancreatic fat stranding raises concern   for acute residual  edematous pancreatitis.   5. Diffusely prominent and predominantly fluid-filled small bowel with   a few air-fluid levels but no focal transition point, likely   reflecting some degree of adynamic ileus.   6. Gastric distention with a gastric tube in place.     CT head 4/4  Impression:  New cerebral edema with preserved gray-white   differentiation.    Recent lab data reviewed, my comments on pertinents:   Sodium 140  Potassium 4.6  Creatinine 3.00  GFR 27  WBC 17.9  Platelets 61    VELIA Morris CNS  Palliative Care Consult Team  Pager: [phone]    55 minutes spent. This includes 35 minutes face to face with patient's significant otherRupesh Tamika discussing current complex health conditions and prognosis, and psychosocial support. Also in Coordination of care with the primary team, palliative  and SW, bedside nurse, and RNCC regarding goals of care and psychosocial assessment/needs.

## 2022-04-04 NOTE — PROGRESS NOTES
CR[facility] STATUS NOTE    DATA:  Time:  6:44 AM  Pressures WNL:  YES  Filter Status:  WDL    Problems Reported/Alarms Noted:  none    Supplies Present:  YES    ASSESSMENT:  Patient Net Fluid Balance:  4/3: net -1.8L  Vital Signs:  Temp:  [97.5  F (36.4  C)-98.8  F (37.1  C)] 98.1  F (36.7  C)  Pulse:  [] 89  Resp:  [14] 14  MAP:  [62 mmHg-76 mmHg] 72 mmHg  Arterial Line BP: ()/(43-58) 95/45  FiO2 (%):  [40 %] 40 %  SpO2:  [96 %-100 %] 100 %    Labs:    Last Renal Panel:  Sodium   Date Value Ref Range Status   04/04/2022 141 133 - 144 mmol/L Final     Potassium   Date Value Ref Range Status   04/04/2022 4.8 3.4 - 5.3 mmol/L Final   04/02/2022 2.6 (LL) 3.4 - 5.3 mmol/L Final     Chloride   Date Value Ref Range Status   04/04/2022 110 (H) 94 - 109 mmol/L Final     Carbon Dioxide (CO2)   Date Value Ref Range Status   04/04/2022 22 20 - 32 mmol/L Final     Anion Gap   Date Value Ref Range Status   04/04/2022 9 3 - 14 mmol/L Final     Glucose   Date Value Ref Range Status   04/04/2022 120 (H) 70 - 99 mg/dL Final   04/04/2022 120 (H) 70 - 99 mg/dL Final     GLUCOSE BY METER POCT   Date Value Ref Range Status   04/04/2022 121 (H) 70 - 99 mg/dL Final     Urea Nitrogen   Date Value Ref Range Status   04/04/2022 58 (H) 7 - 30 mg/dL Final     Creatinine   Date Value Ref Range Status   04/04/2022 3.26 (H) 0.66 - 1.25 mg/dL Final     GFR Estimate   Date Value Ref Range Status   04/04/2022 25 (L) >60 mL/min/1.73m2 Final     Comment:     Effective December 21, 2021 eGFRcr in adults is calculated using the 2021 CKD-EPI creatinine equation which includes age and gender (Christine dow al., NEJM, DOI: 10.1056/TFUHjw8233333)     Calcium   Date Value Ref Range Status   04/04/2022 8.0 (L) 8.5 - 10.1 mg/dL Final     Phosphorus   Date Value Ref Range Status   04/04/2022 5.0 (H) 2.5 - 4.5 mg/dL Final     Albumin   Date Value Ref Range Status   04/04/2022 1.9 (L) 3.4 - 5.0 g/dL Final     Lab Results   Component Value Date    WBC 18.8  04/04/2022     Lab Results   Component Value Date    RBC 3.35 04/04/2022     Lab Results   Component Value Date    HGB 11.1 04/04/2022     Lab Results   Component Value Date    HCT 33.4 04/04/2022     No components found for: MCT  Lab Results   Component Value Date     04/04/2022     Lab Results   Component Value Date    MCH 33.1 04/04/2022     Lab Results   Component Value Date    MCHC 33.2 04/04/2022     Lab Results   Component Value Date    RDW 14.7 04/04/2022     Lab Results   Component Value Date    PLT 85 04/04/2022       Goals of Therapy:  I=O    INTERVENTIONS:   recirc for CT    PLAN:  Continue fluid removal as tolerated per goals of therapy. Check circuit daily and change circuit q72h and prn. Please contact CRRT [place] [phone] with any questions/concerns.

## 2022-04-04 NOTE — PROGRESS NOTES
Antimicrobial Stewardship Team Note    Antimicrobial Stewardship Program - A joint venture between [facility] and  Physicians to optimize antibiotic management.  NOT a formal consult - Restricted Antimicrobial Review     Patient: Michael Callejas  MRN: [id number]  Allergies: Patient has no allergy information on record.    Brief Summary: Michael Callejas is a 36 year-old male with a PMH of drug abuse and colon cancer. Patient was admitted to the emergency department on 4/1/2022 due to a cardiac arrest secondary to methamphetamine and DMT ingestion.    HPI: CPR was given by roommates for 5 minutes prior to EMS arrival. EMS converted PEA to VF and intubated in field, ROSC was not achieved. Patient was taken to cath lab upon arrival to [facility]  and cannulated for VA ECMO. Additionally, and IABP was also placed. Labs on admission were notable for K2.9, SrCr 1.58, [id number], , otagnrml15,347, lactate dehydrogenase 926, WBC 25.9, negative UA, and urine positive for amphetamine. CT Head on 4/1 showed no acute intracranial pathology. Repeat head CT on 4/3 shows new cerebral edema with preserved gray-white differentiation. CT chest/abdomen/pelvis on 4/1 showed multifocal groundglass opacities with superimposed consolidative opacities in the left lower and upper lobes which represents aspiration vs. infection, particularly given endobronchial debris/mucus plugging in the left greater than right lower lobes and lingula. MRSA nasal swab positive on 4/1 and finalized sputum culture positive for 3+ MRSA. Patient was started on Zosyn and vancomycin on 4/1 for aspiration pneumonia. Blood culture on 4/3 with NGTD x1 day. Today, patient remains intubated on stable oxygen requirements (FiO2 40%) , weaning down on pressors, WBC 19.2, and chest x-ray 4/4 showed clear lungs.         Active Anti-infective Medications   (From admission, onward)                 Start     Stop    04/04/22 1200  vancomycin 1500 mg  1,500  mg,   Intravenous,   EVERY 24 HOURS        Aspiration Pneumonia        --    04/03/22 1800  piperacillin-tazobactam  4.5 g,   Intravenous,   EVERY 6 HOURS        Aspiration Pneumonia        04/07/22 1159                  Assessment: Aspiration pneumonia   Patient is currently on day 4 of Zosyn and vancomycin therapy for aspiration pneumonia secondary to cardiac arrest and VA-ECMO. Patient has remained afebrile (noting he is on CRRT), repeat chest imaging shows clear lungs, and WBC is down trending. The patient's MRSA nasal swab and sputum culture both are positive for MRSA. Vancomycin provides appropriate coverage for this and should be continued. Recommend stopping Zosyn therapy given the patient has not grown any gram-negative organisms on cultures and does not have current or history of Pseudomonas infection or colonization. Agree with a short duration of vancomycin therapy for aspiration pneumonia.     Recommendations:  Agree with vancomycin therapy to complete a short duration for aspiration pneumonia  Stop Zosyn     Pharmacy took the following actions: Called/paged provider, Electronic note created.     Discussed with ID Staff: Laverne Sousa [facility] and MD Ele Schwartz, PharmD Candidate 2022   Office: [phone]     Vital Signs/Clinical Features:  Vitals  Report        04/02 0700  04/03 0659 04/03 0700  04/04 0659 04/04 0700  04/04 1319   Most Recent      Temp ( F) 92.8 -  98.6    97.5 -  98.8    97.9 -  98.2     97.9 (36.6) 04/04 1200    Pulse 59 -  93    86 -  100    86 -  88     87 04/04 1200    Resp   18      14    12 -  17     14 04/04 1300    SpO2 (%) 92 -  100    96 -  100    98 -  100     100 04/04 1200            Labs  Estimated Creatinine Clearance: 37.8 mL/min (A) (based on SCr of 3.41 mg/dL (H)).  Recent Labs   Lab Test 04/03/22  1554 04/03/22  1952 04/03/22  2159 04/04/22  0358 04/04/22  1002 04/04/22  1209   CR 4.27* 4.10* 3.96* 3.26* 3.44* 3.41*       Recent Labs   Lab Test  04/01/22  1639 04/01/22 2205 04/03/22  0953 04/03/22  1554 04/03/22 2159 04/04/22 0358 04/04/22  1002 04/04/22  1209   WBC 22.0*   < > 21.5* 20.5* 18.1* 18.8* 18.5* 19.2*   ANEU 16.5*  --   --   --   --   --   --   --    ALYM 3.5  --   --   --   --   --   --   --    PETTY 2.0*  --   --   --   --   --   --   --    AEOS 0.0  --   --   --   --   --   --   --    HGB 12.9   < > 12.6* 12.1* 11.5* 11.1* 10.2* 10.1*   HCT 39.8   < > 38.0* 36.7* 35.1* 33.4* 31.2* 30.5*   *   < > 98 98 98 100 99 100      < > 122* 111* 91* 85* 74* 69*    < > = values in this interval not displayed.       Recent Labs   Lab Test 04/03/22  0349 04/03/22  0953 04/03/22  1157 04/03/22 1554 04/03/22 1952 04/03/22 2159 04/04/22 0358 04/04/22  1002 04/04/22  1209   BILITOTAL 1.7* 1.6*  --  1.4*  --  1.3 1.2 1.1  --    ALKPHOS 83 74  --  73  --  72 70 73  --    ALBUMIN 2.1* 2.0*   < > 2.0* 2.0* 2.0* 1.9* 1.8* 1.8*   AST 2,014* 1,891*  --  1,704*  --  1,529* 1,375* 1,186*  --    ALT 1,361* 1,306*  --  1,213*  --  1,100* 982* 849*  --     < > = values in this interval not displayed.       Recent Labs   Lab Test 04/01/22 1639 04/01/22  1643 04/02/22 0355 04/02/22  0356 04/02/22 2152 04/03/22  0349 04/03/22  0350 04/03/22  0953 04/03/22  1554 04/03/22 2158 04/04/22 0357 04/04/22 0358 04/04/22  1000   PCAL 0.38*  --   --   --  159.63*  --   --   --   --   --   --   --   --    LACT  --    < >  --    < >  --   --  2.9* 2.9* 3.2* 3.2* 3.2*  --  2.6*   CRP 4.3  --  13.0*  --   --  97.0*  --   --   --   --   --  190.0*  --    SED 5  --  4  --   --  10  --   --   --   --   --  41*  --     < > = values in this interval not displayed.       Recent Labs   Lab Test 04/02/22  1846   VANCOMYCIN 20.9       Culture Results:  7-Day Micro Results       Procedure Component Value Units Date/Time    Respiratory Aerobic Bacterial Culture     Order Status: Sent Lab Status: No result     Specimen: Sputum from Trachea     Blood Culture Arm, Left  [[id number]]  (Normal) Collected: 04/03/22 1028    Order Status: Completed Lab Status: Preliminary result Updated: 04/04/22 1216    Specimen: Blood from Arm, Left      Culture No growth after 1 day    Blood Culture Peripheral Blood     Order Status: Canceled Lab Status: No result     Specimen: Peripheral Blood     Respiratory Aerobic Bacterial Culture     Order Status: Sent Lab Status: No result     Specimen: Sputum from Trachea     Respiratory Aerobic Bacterial Culture     Order Status: Sent Lab Status: No result     Specimen: Sputum from Trachea     Blood Culture Peripheral Blood [[id number]]     Order Status: Canceled Lab Status: No result     Specimen: Peripheral Blood     Respiratory Aerobic Bacterial Culture with Gram Stain [[id number]]  (Abnormal)  (Susceptibility) Collected: 04/01/22 1846    Order Status: Completed Lab Status: Final result Updated: 04/04/22 0738    Specimen: Sputum from Endotracheal      Culture 3+ Normal hortensia      3+ Staphylococcus aureus MRSA     Gram Stain Result <25 PMNs/low power field      3+ Mixed hortensia    Susceptibility       Staphylococcus aureus MRSA (2)       Antibiotic Interpretation Sensitivity   Method Status      Oxacillin Resistant >=4.0 ug/mL PRUDENCIO Final      Oxacillin susceptible isolates are susceptible to cephalosporins (example: cefazolin and cephalexin) and beta lactam combination agents. Oxacillin resistant isolates are resistant to these agents.        Gentamicin Susceptible <=0.5 ug/mL PRUDENCIO Final     Ciprofloxacin  [*]  Susceptible <=0.5 ug/mL PRUDENCIO Final     Levofloxacin  [*]  Susceptible <=0.12 ug/mL PRUDENCIO Final     Moxifloxacin  [*]  Susceptible <=0.25 ug/mL PRUDENCIO Final     Inducible macrolide resistance test  [*]  Negative Negative ug/mL PRUDENCIO Final     Erythromycin Susceptible <=0.25 ug/mL PRUDENCIO Final     Clindamycin Susceptible <=0.25 ug/mL PRUDENCIO Final     Quinupristin/Dalfopristin  [*]  Susceptible <=0.25 ug/mL PRUDENCIO Final     Linezolid Susceptible 2.0 ug/mL PRUDENCIO Final      Vancomycin Susceptible 1.0 ug/mL PRUDENCIO Final     Tetracycline Susceptible <=1.0 ug/mL PRUDENCIO Final     Tigecycline  [*]  Susceptible <=0.12 ug/mL PRUDENCIO Final     Nitrofurantoin  [*]  Susceptible <=16.0 ug/mL PRUDENCIO Final     Rifampin  [*]  Susceptible <=0.5 ug/mL PRUDENCIO Final     Trimethoprim/Sulfamethoxazole Susceptible <=0.5/9.5 ug/mL PRUDENCIO Final     Susceptibility Comments       MRSA requires contact precautions.                       [*]  Suppressed Antibiotic                   MRSA MSSA PCR, Nasal Swab [[id number]]  (Abnormal) Collected: 04/01/22 1845    Order Status: Completed Lab Status: Final result Updated: 04/02/22 0125    Specimen: Swab from Nare, Right      MRSA Target DNA Positive     SA Target DNA Positive    Narrative:      The [employer]  Xpert SA Nasal Complete assay performed in the [employer]  Dx System is a qualitative in vitro diagnostic test designed for rapid detection of Staphylococcus aureus (SA) and methicillin-resistant Staphylococcus aureus (MRSA) from nasal swabs in patients at risk for nasal colonization. The test utilizes automated real-time polymerase chain reaction (PCR) to detect MRSA/SA DNA. The Xpert SA Nasal Complete assay is intended to aid in the prevention and control of MRSA/SA infections in healthcare settings. The assay is not intended to diagnose, guide or monitor treatment for MRSA/SA infections, or provide results of susceptibility to methicillin. A negative result does not preclude MRSA/SA nasal colonization.     Blood Culture Peripheral Blood     Order Status: Sent Lab Status: No result     Specimen: Peripheral Blood     Blood Culture Peripheral Blood     Order Status: Sent Lab Status: No result     Specimen: Peripheral Blood             Recent Labs   Lab Test 04/01/22  1654   URINEPH 8.5*   NITRITE Negative   LEUKEST Negative   WBCU 2                         Imaging: Cardiac Catheterization    Result Date: 4/3/2022  ECMO cannulation and initiation while on Bry. IABP placement in  the LCFA. Right arterial line placement. Normal Coronaries    Echocardiogram Complete    Result Date: 2022  [id number]^BRENDAN^KEE^DARION  [facility] [address] [place] [address]  Name: TIERNEY CARVALHO MRN: [id number] : 1985 Study Date: 2022 10:52 AM Age: 36 yrs Gender: Male Patient Location: [facility] Reason For Study: Shock Ordering Physician: KEE CARDONA Performed By: Jinny Mueller RDCS  BSA: 2.0 m2 Height: 71 in Weight: 185 lb HR: 52 BP: 70/44 mmHg ______________________________________________________________________________ Procedure Complete Portable Echo Adult. Technically difficult study.Extremely poor acoustic windows. ______________________________________________________________________________ Interpretation Summary Technically difficult study.Extremely poor acoustic windows. Baseline (VA ECMO at 3.9 L/min and IABP):  LV size is normal, LVIDd 4.2 cm. The LV function is severely reduced, LVEF 5 to 10%, there is severe diffuse hypokinesis. Septum midline. The aortic valve partially opens with each cardiac cycle. The RV is small in size with moderately reduced RV function. No pericardial effusion is present. ______________________________________________________________________________ Atria Both atria appear normal.  Mitral Valve The mitral valve is normal.  Vessels ECMO cannulae is noted in the IVC.  Pericardium No pericardial effusion is present.  ______________________________________________________________________________ MMode/2D Measurements & Calculations IVSd: 0.74 cm LVIDd: 4.2 cm LVIDs: 4.0 cm LVPWd: 0.82 cm  FS: 6.4 % LV mass(C)d: 99.2 grams LV mass(C)dI: 48.6 grams/m2 RWT: 0.39  ______________________________________________________________________________ Report approved by: MD Matthew Bennett 2022 02:10 PM        Carotid  Bilateral    Result Date: 4/4/2022  BILATERAL CAROTID DUPLEX DOPPLER ULTRASOUND 4/2/2022 9:01 AM CLINICAL HISTORY: Cardiac Arrest on ECMO COMPARISON: None available. REFERRING PROVIDER: JESSICA MORTON TECHNIQUE: Grayscale (B-mode) and duplex and spectral Doppler ultrasound of the common carotid, extracranial internal carotid, external carotid, and vertebral artery origins. Velocity measurements obtained with angle correction at or less than 60 degrees. FINDINGS: ABNORMAL waveforms consistent with ECMO. RIGHT SIDE: Plaque Morphology: Minimal plaque.    Proximal CCA: 108/15 cm/s    Mid CCA: 94/50 cm/s    Distal CCA: 105/22 cm/s        External CA: 92/28 cm/s    Proximal ICA: 172/87 cm/s    Mid ICA: 195/71 cm/s    Distal ICA: 165/113 cm/s    Vertebral artery: Antegrade: 95/62 cm/s ICA/CCA ratio: 1.86 LEFT SIDE: Plaque Morphology: Minimal plaque.    Proximal CCA: 115/48 cm/s    Mid CCA: 112/47 cm/s    Distal CCA: 95/43 cm/s        External CA: 65/16 cm/s    Proximal ICA: 116/54 cm/s    Mid ICA: 148/102 cm/s    Distal ICA: 184/116 cm/s    Vertebral artery: Antegrade: 86/32 cm/s ICA/CCA ratio: 1.93     IMPRESSION: 1. RIGHT ICA: Less than 50% diameter stenosis by grayscale and color Doppler imaging. 50-69% diameter stenosis by sonographic velocity criteria. 2. LEFT ICA:  Less than 50% diameter stenosis by grayscale and color Doppler imaging. 50-69% diameter stenosis by sonographic velocity criteria. [employer] Updated Recommendations for Carotid Stenosis Interpretation Criteria - October 2021. https://intersocietal.org/wp-content/uploads/2021/10/IAC-Vascular-Test hn-Ysgnajwngdzcd_Ghrbjxy-Fakyhfopyqmxmar-for-Carotid-Stenosis-Interpre ation-Criteria.pdf      Normal           ICA PSV: < 180 cm/s           Plaque Estimate: None           ICA/CCA PSV Ratio: < 2.0           ICA EDV: < 40 cm/s      < 50%           ICA PSV: < 180 cm/s           Plaque Estimate: < 50%           ICA/CCA PSV Ratio: < 2.0            ICA EDV: < 40 cm/s      50 - 69%           ICA PSV: 180 - 230 cm/s           Plaque Estimate: > 50%           ICA/CCA PSV Ratio: 2.0 - 4.0           ICA EDV: 40 - 100 cm/s      > 70% but less than near occlusion           ICA PSV: > 230 cm/s           Plaque Estimate: > 50%           ICA/CCA PSV Ratio: > 4.0           ICA EDV: > 100 cm/s      Near occlusion           ICA PSV: > 230 cm/s           Plaque Estimate: Visible           ICA/CCA PSV Ratio: Variable           ICA EDV: Variable      Total occlusion           ICA PSV: Undetectable           Plaque Estimate: Visible, no detectable lumen           ICA/CCA PSV Ratio: N/A           ICA EDV: N/A                                       Additional criteria from vascular surgery    > 80%      EDV > 120 cm/sec  US CAROTID BILATERAL 4/2/2022 9:01 AM History:  Cardiac Arrest on ECMO Comparison Study: None Findings: Grayscale, color, and spectral ultrasound performed. Right carotid peak velocities systolic/diastolic: CCA mid:    94/50 cm/s ICA proximal:  172/87 cm/s ICA mid:    195/71 cm/s ICA distal:    165/113cm/s ICA/CCA:              1.86 ECA:      92 cm/s vertebral:    95 Waveforms throughout the right carotid artery are monophasic and distorted due to ECMO. Left carotid peak velocities systolic/diastolic: CCA mid:    112/27 cm/s ICA proximal:  116/54 cm/s ICA mid:    148/102 cm/s ICA distal:    184/116 cm/s ICA/CCA:             1.93 ECA:      65 cm/s vertebral:                 86 Waveforms throughout the right carotid artery are monophasic and distorted due to ECMO. Consensus Panel Gray-Scale and Doppler US Criteria for Diagnosis of ICA Stenosis (Radiology 11/2003)    Normal       ICA PSV < 125 cm/sec      Plaque Estimate None      ICA/CCA  PSV Ratio < 2.0      ICA EDV < 40 cm/sec    < 50%        ICA PSV < 125 cm/sec      Plaque Estimate < 50%      ICA/CCA  PSV Ratio < 2.0      ICA EDV < 40 cm/sec    50- 69%      ICA -230 cm/sec      Plaque Estimate >  or = 50%      ICA/CCA PSV Ratio 2.0-4.0      ICA EDV  cm/sec     > or = 70%, less than near occlusion      ICA PSV > 230 cm/sec      Plaque Estimate > or = 50%      ICA/CCA Ratio > 4.0      ICA EDV > 100 cm/sec                                        Additional criteria from vascular surgery    > 80%      EDV > 120 cm/sec Impression: Elevated velocities throughout both the right and left carotid arteries. Waveforms are distorted due to ECMO. Findings are overall nonspecific. I have personally reviewed the examination and initial interpretation and I agree with the findings. PEG FISHER MD   SYSTEM ID:  [id number]    US Lower Extremity Arterial Duplex Bilateral    Result Date: 4/4/2022  ULTRASOUND LOWER EXTREMITY ARTERIAL DUPLEX BILATERAL  4/2/2022 9:01 AM CLINICAL HISTORY: Baseline to assess blood flow to Lower Extremities (VA ECMO). COMPARISONS: None available. REFERRING PROVIDER: JESSICA MORTON TECHNIQUE: Bilateral leg arteries evaluated with color Doppler and Doppler waveform ultrasound FINDINGS: RIGHT:      Common femoral artery: OBSCURED      Profundus femoral artery: OBSCURED      Superficial femoral artery, origin: OBSCURED      Superficial femoral artery, mid thigh: OBSCURED      Superficial femoral artery, distal thigh: 36 cm/s, tardus parvus monophasic      Popliteal artery: 24 cm/s, tardus parvus monophasic      Posterior tibial artery, ankle: 37 cm/s, tardus parvus monophasic      Anterior tibial artery, ankle: 24 cm/s, tardus parvus monophasic LEFT:      Common femoral artery: OBSCURED      Profundus femoral artery: OBSCURED      Superficial femoral artery, origin: OBSCURED      Superficial femoral artery, proximal thigh: 161/0 cm/s, tardus parvus, sinusoidal.      Superficial femoral artery, mid thigh: 175/0 cm/s, tardus parvus, sinusoidal.      Superficial femoral artery, distal thigh: 103/0 cm/s, tardus parvus, sinusoidal.      Popliteal artery: 64/0 cm/s, tardus parvus, sinusoidal.      Posterior  tibial artery, ankle: 58/0 cm/s, tardus parvus, sinusoidal.      Anterior tibial artery, ankle: 30/0 cm/s, tardus parvus, sinusoidal.     IMPRESSION: 1. Right tardus parvus monophasic waveform suggest a more proximal occlusion or near occlusion. 2. Left tardus parvus sinusoidal waveforms likely due to cardiac function. 3. Dopplerable flow in the anterior and posterior tibial arteries at the ankles bilaterally. I have personally reviewed the examination and initial interpretation and I agree with the findings. PEG FISHER MD   SYSTEM ID:  [id number]    XR Chest Port 1 View    Result Date: 4/4/2022  EXAMINATION:  XR CHEST PORT 1 VIEW 4/4/2022 1:00 AM. COMPARISON: 4/3/2022 HISTORY:  Check endotracheal tube placement and ECLS cannula placement. DO NOT log-roll patient.  Place film under patient using patient safety handling process. FINDINGS: Frontal view. Unchanged endotracheal tube, ECMO cannula, IABP, temperature probe, gastric tube. Cardiac silhouette is normal. Clear lungs.     IMPRESSION: Stable support devices. I have personally reviewed the examination and initial interpretation and I agree with the findings. REGIS ORLANDO MD   SYSTEM ID:  [id number]    XR Chest Port 1 View    Result Date: 4/3/2022  Exam: XR CHEST PORT 1 VIEW, 4/3/2022 2:25 AM Indication: Check endotracheal tube placement and ECLS cannula placement. DO NOT log-roll patient.  Place film under patient using patient safety handling process. Comparison: 4/2/2022 , 4/1/2022 Findings: Portable semiupright AP view of the chest. Endotracheal tube tip projected over the lower thoracic trachea 2 cm proximal to lizabeth. ECMO cannula projects over the high SVC. Partially visualized enteric tubes. Midline trachea, normal cardial mediastinal silhouette. No acute pulmonary opacities, pleural effusion, or significant pneumothorax. Markedly distended stomach stomach partially visualized.     Impression: 1. Endotracheal tube projects 2 cm proximal to lizabeth. ECMO  cannula projects over high SVC. 2. No acute cardiopulmonary abnormalities. I have personally reviewed the examination and initial interpretation and I agree with the findings. ALEXANDER VILLELA MD   SYSTEM ID:  [id number]    XR Chest Port 1 View    Result Date: 4/2/2022  Exam: XR CHEST PORT 1 VIEW, 4/2/2022 2:15 AM Indication: Check endotracheal tube placement and ECLS cannula placement. DO NOT log-roll patient.  Place film under patient using patient safety handling process. Comparison: 4/1/2022 Findings: Portable semiupright AP view of the chest. Endotracheal tube tip projected over the lower thoracic trachea 2 cm proximal to lizabeth. ECMO cannula projects over the high SVC. Partially visualized enteric tubes. Midline trachea, normal cardial mediastinal silhouette. No acute pulmonary opacities, pleural effusion, or significant pneumothorax. Markedly distended stomach stomach.     Impression: 1. Endotracheal tube and ECMO cannula as detailed. 2. No acute cardiopulmonary abnormalities. I have personally reviewed the examination and initial interpretation and I agree with the findings. ALEXANDER VILLELA MD   SYSTEM ID:  [id number]    XR Chest Port 1 View    Result Date: 4/1/2022  EXAM: XR CHEST PORT 1 VIEW  4/1/2022 5:55 PM HISTORY:  Check endotracheal tube placement and ECLS cannula placement. DO NOT log-roll patient.  Place film under patient using patient safety handling process.   COMPARISON:  Same-day CT chest FINDINGS: Portable upright view of the chest. Endotracheal tube tip projects 2.6 cm above the lizabeth. Inferior approach ECMO cannula projects over the right brachiocephalic vein. Esophageal temperature probe tip terminates over the hypopharynx. Intra-aortic balloon pump radiodense superior marker projects over the level of the proximal descending thoracic aorta. Enteric tube tip courses below the diaphragm and beyond the field of view. Trachea is midline. Cardiomediastinal silhouette is within normal limits. Prominent  pulmonary vasculature. Low lung volumes. Perihilar and hazy patchy opacities in the left upper lung field. No acute osseous abnormality. Visualized upper abdomen is unremarkable.      IMPRESSION: 1. Endotracheal tube tip projects 2.6 cm above the lizabeth. Inferior approach ECMO cannula projects over the right brachiocephalic vein. 2. Esophageal temperature probe tip projects over the hypopharynx. 3. Perihilar and hazy patchy opacities in the left upper lung, better seen on comparison CT. I have personally reviewed the examination and initial interpretation and I agree with the findings. EPI RENAE DO   SYSTEM ID:  [id number]    XR Abdomen Port 1 View    Result Date: 4/1/2022  EXAMINATION:  XR ABDOMEN PORT 1 VIEWS 4/1/2022 5:55 PM. COMPARISON: Same day CT HISTORY:  OG tube placement FINDINGS: Frontal view of the upper abdomen/lower chest. Stable position of IABP and inferior approach ECMO cannula and central venous catheter. Gastric tube with tip and sidehole projecting over the stomach. Stomach is distended with air. Gaseous distended small bowel partially visualized. Lungs are clear.     IMPRESSION: Gastric tube tip and sidehole project over the stomach, which is dilated with air. I have personally reviewed the examination and initial interpretation and I agree with the findings. EPI RENAE DO   SYSTEM ID:  [id number]    CT Chest Abdomen Pelvis w/o Contrast    Result Date: 4/1/2022  EXAMINATION: CT CHEST ABDOMEN PELVIS W/O CONTRAST, 4/1/2022 4:26 PM INDICATION: Status post cardiac arrest COMPARISON STUDY: None available TECHNIQUE: CT scan of the chest, abdomen and pelvis was performed on multidetector CT scanner using volumetric acquisition technique and images were reconstructed in multiple planes with variable thickness and reviewed on dedicated workstations. CT scan radiation dose is optimized to minimum requisite dose using automated dose modulation techniques. FINDINGS: Lines and tubes: Endotracheal tube  is 1.4 cm above the lizabeth. Right inferior approach ECMO cannula tip in the right brachiocephalic vein. Intra-aortic balloon pump superior marker at the proximal descending thoracic aorta. Right femoral arterial line terminates in the distal aorta. Left femoral central venous catheter terminates in the right atrium. Gastric tube terminates within the stomach. Chest: Mediastinum: Cardiac size is within normal limits. No pericardial effusion Normal caliber of the aorta. No pathologically enlarged hilar, mediastinal or axillary lymphadenopathy. 11 mm right axillary lymph node, likely reactive. Small amount of hyperattenuating material in the anterior mediastinum/retrosternal region. Pleura: No pleural effusion or thickening. Lungs: Multifocal groundglass opacities predominantly in the upper lung fields. There are superimposed dependent consolidative opacities in the left upper lobe along the major fissure and left lower lobe. Scattered sub-6 mm pulmonary nodules. For example adjacent pulmonary nodules, measuring up to 4 mm in the left lower lobe (series 4 image 111 and 115). Endobronchial mucous plugging/debris in the lower lobes, left greater than right, and lingula. Abdomen and Pelvis: Liver: No mass. No intrahepatic biliary ductal dilation. Biliary System: Normal gallbladder. No extrahepatic biliary ductal dilation. Pancreas: Partially obscured by beam hardening defect. No mass or pancreatic ductal dilation. Edematous pancreatic parenchyma with peripancreatic fat stranding. Adrenal glands: No mass or nodules Spleen: Normal. Kidneys: No obstructing calculus or hydronephrosis.  No suspicious renal mass allowing for noncontrast technique. Gastrointestinal tract: Prominent air and fluid filled stomach. Normal appendix. Diffusely prominent predominantly fluid-filled small bowel with a few air-fluid levels but no focal transition point.  Moderate rectal stool burden. Mesentery/peritoneum/retroperitoneum: No mass. No free  fluid or air. Lymph nodes: No significant lymphadenopathy. Vasculature: No infrarenal aneurysm. Pelvis: Urinary bladder is normal.  Lott catheter in place Osseous structures: Nondisplaced midsternal fracture. Mild chronic superior endplate compression deformity of T1. Nondisplaced anterior right fourth rib fracture.   Soft tissues: Within normal limits.     IMPRESSION: 1. Endotracheal tube tip in the low thoracic trachea. Venous ECMO cannula terminates in the right brachiocephalic vein. IABP superior marker at the level of the proximal descending thoracic aorta. Other support devices detailed above. 2. Multifocal groundglass opacities with superimposed consolidative opacities in the left lower and upper lobes. May represent aspiration vs. infection, particularly given endobronchial debris/mucus plugging in the left greater than right lower lobes and lingula 3. Nondisplaced midsternal fracture with a small amount of retrosternal hemorrhage. Nondisplaced anterior right fourth rib fracture. No pneumothorax. 4. Edematous pancreas with peripancreatic fat stranding raises concern for acute residual edematous pancreatitis. 5. Diffusely prominent and predominantly fluid-filled small bowel with a few air-fluid levels but no focal transition point, likely reflecting some degree of adynamic ileus. 6. Gastric distention with a gastric tube in place. I have personally reviewed the examination and initial interpretation and I agree with the findings. EPI RENAE DO   SYSTEM ID:  [id number]    CT Head w/o Contrast    Result Date: 4/4/2022  CT HEAD W/O CONTRAST 4/4/2022 5:25 AM History: Neuro deficit, acute, stroke suspected Comparison: 4/1/2022 Technique: Using multidetector thin collimation helical acquisition technique, axial, coronal and sagittal CT images from the skull base to the vertex were obtained without intravenous contrast.  (topogram) image(s) also obtained and reviewed. Findings: Image quality is mildly  degraded by beam hardening artifact from EEG wires. No acute intracranial hemorrhage or midline shift. Diffuse cerebral edema with near complete sulcal effacement. Decreased caliber of the ventricles compared to prior exam. Crowding of the basal cisterns. Gray-white differentiation appears preserved. Mucosal thickening of the maxillary, frontal sinuses and ethmoid air cells. Mastoid air cells are clear. No calvarial fracture. Persistent metopic suture.     Impression:  New cerebral edema with preserved gray-white differentiation. Findings were discussed with Dr. Welch by Dr. Devine via telephone at 5:34 AM. I have personally reviewed the examination and initial interpretation and I agree with the findings. PUJA TAY MD   SYSTEM ID:  [id number]    CT Head w/o Contrast    Result Date: 4/1/2022  CT HEAD W/O CONTRAST 4/1/2022 4:26 PM History: Status post cardiac arrest Comparison: None Technique: Using multidetector thin collimation helical acquisition technique, axial, coronal and sagittal CT images from the skull base to the vertex were obtained without intravenous contrast.  (topogram) image(s) also obtained and reviewed. Findings: There is no intracranial hemorrhage, mass effect, or midline shift. Gray/white matter differentiation in both cerebral hemispheres is preserved. Ventricles are proportionate to the cerebral sulci. The basal cisterns are clear. Nonspecific fluid containing foci of air in the nasal cavity. Minimal mucosal thickening of the maxillary sinuses. Remainder of paranasal sinuses and mastoid air cells are clear. Orbits are unremarkable. No acute calvarial fracture. Persistent metopic suture.     Impression:  No acute intracranial pathology. I have personally reviewed the examination and initial interpretation and I agree with the findings. PUJA TAY MD   SYSTEM ID:  [id number]    EEG Video 12-26 hr Unmonitored    Result Date: 4/3/2022  EEG Video 12-26 hr Unmonitored Result VIDEO EEG DATE:  22 VIDEO EEG [id number] VIDEO EEG DAY#: 1 VIDEO EEG SOURCE FILE DURATION: 18 hours and 03 minutes PATIENT HISTORY: 36-year-old, status post cardiac arrest, reportedly related to methamphetamine and other street drug use.  He is currently intubated and encephalopathic in ICU.  Hypothermia was ineffective at study onset but he was warmed and was euthermic at the end of the study.  He was treated with fentanyl 200 mcg/h and midazolam 8 mg/h throughout the study. TECHNICAL SUMMARY: This is a video-EEG monitoring study. EEG was recorded from 23 scalp electrodes placed according to the 10-20 international system by qualified technicians. Additional electrodes were utilized for referencing, artifact detection, and recording from other cerebral regions. Video was continuously recorded. Video was reviewed for clinical correlation and to assist with EEG interpretations. BACKGROUND ACTIVITY: At study onset, polymorphic delta, typically 5  V in amplitude or less.  EEG was discontinuous and polymorphic delta, when seen, was not synchronous.  Delta activity occupied perhaps 30 to 40% of the ongoing record.  As patient was warmed, persistence and amplitude of delta activity increased and some alpha..  At the end of the study, 1 Hz delta waves up to 15  V were noted.  EEG was more continuous.  5  V bifrontal alpha was seen intermittently in the frontocentral regions. ACTIVATION PROCEDURE: Stimulation was performed utilizing ICU protocol at 10:47 AM on 2022.  This was not associated with changes in background. OTHER INTERICTAL ABNORMALITIES: No epileptiform discharges. ICTAL ABNORMALITIES: No electrographic seizures. IMPRESSION: Severely abnormal.  1) electrocerebral activity was present but was severely attenuated, discontinuous, and unreactive.  Findings are consistent with a severe diffuse encephalopathy.  There was minor improvement in amplitude of electrocerebral activity following discontinuation of hypothermia but  results continued consistent with severe diffuse encephalopathy. 2) electrographic seizures or periodic discharges were not seen at any point, even during a brief period of recording following discontinuation of hypothermia. Jean Marie Mota MD EPILEPSY STAFF

## 2022-04-04 NOTE — PROGRESS NOTES
Nephrology Progress Note  04/04/2022         Assessment & Recommendations:   Michael Callejas is a 36 year old male with PMH of colon cancer dx'd at age 16 ( per mother no longer resectable), substance use d/o, admitted following out of hospital PEA arrest 2/2 Meth overdose which progressed to VT/Cardiogenic shock requiring VA Ecmo/IABP c/b probable anoxic brain injury and HALEIGH. Nephrology consulted for ongoing dialysis needs.    1. HALEIGH, oligoanuric - ATN in setting of cardiogenic shock and cardiac arrest. Started on CRRT 4/3/22.  - Continue CRRT, change to 4K today, Is=Os to net negative if tolerated   - Continue to monitor for renal recovery with daily chemistry labs/ strict I/O and daily weights   - CRRT access is through the ecmo circuit     2. Volume status - Mild hypervolemia. He is intubated. Weight 86.1 kg. Admission weight 84.2 kg. Pressor requirements trending down, will start to pull fluid gently if tolerated.    - Continue daily weights/strict I/O     3. Cardiogenic shock - On VA ecmo/IABP. MAPs > 65.    - Per CV     4. Electrolytes - K 4.5, Na 144  - Changed to 4K baths on CRRT  - 3% saline running peripherally per Neurocrit due to cerebral edema      5. Acid base - Bicarb 22     6. Colon Ca? - Needs further evaluation. Perhaps getting care everywhere updated would be helpful. Per mother patient was seeing someone here in [place] who told him it was too late for surgery (this history obtained from initial consult by Angela Salinas).        Recommendations were communicated to primary team via note    Seen and discussed with Dr. Jarad Stahl MD   Division of Renal Disease and Hypertension  [phone]    Interval History :   Nursing and provider notes from last 24 hours reviewed.  Pt rewarmed yesterday. Pressor requirements have trended down. No issues with CRRT circuit.       Review of Systems:   ROS unobtainable - pt intubated & non-responsive    Physical Exam:   I/O last 3 completed  "shifts:  In: 2120.87 [I.V.:1485.87; Other:5; NG/GT:285]  Out: 470 [Urine:54; Emesis/NG output:250; Other:166]   Pulse 87   Temp 97.9  F (36.6  C) (Esophageal)   Resp 14   Ht 1.803 m (5' 11\")   Wt 87.5 kg (192 lb 14.4 oz)   SpO2 100%   BMI 26.90 kg/m       GENERAL APPEARANCE: intubated, non-responsive  PULM: lungs coarse bilaterally, mechanically ventilated  CV: peripherally cannulated for VA ECMO, IABP in place     -1+ edema throughout   GI: soft, mildly distended  INTEGUMENT: no cyanosis, no rash  NEURO: not responsive to voice  Access ECMO circuit    Labs:   All labs reviewed by me  Electrolytes/Renal - Recent Labs   Lab Test 04/04/22  1210 04/04/22  1209 04/04/22  1012 04/04/22  1002 04/04/22  0816 04/04/22  0814 04/04/22  0600 04/04/22  0358 04/03/22 1954 04/03/22 1952   NA  --  144  --  141  --  142  --  141   < > 143   POTASSIUM  --  4.5  --  4.5  --   --   --  4.8   < > 5.4*   CHLORIDE  --  111*  --  111*  --   --   --  110*   < > 113*   CO2  --  22  --  23  --   --   --  22   < > 19*   BUN  --  55*  --  58*  --   --   --  58*   < > 62*   CR  --  3.41*  --  3.44*  --   --   --  3.26*   < > 4.10*   * 122* 131* 129*   < >  --    < > 120*   < > 102*   SAMIR  --  7.6*  --  7.9*  --   --   --  8.0*   < > 8.0*   MAG  --  2.0  --  2.0  --   --   --  2.1   < > 2.3   PHOS  --  4.4  --   --   --   --   --  5.0*  --  5.4*    < > = values in this interval not displayed.       CBC -   Recent Labs   Lab Test 04/04/22  1209 04/04/22  1002 04/04/22  0358   WBC 19.2* 18.5* 18.8*   HGB 10.1* 10.2* 11.1*   PLT 69* 74* 85*       LFTs -   Recent Labs   Lab Test 04/04/22  1209 04/04/22  1002 04/04/22  0358 04/03/22  2159   ALKPHOS  --  73 70 72   BILITOTAL  --  1.1 1.2 1.3   ALT  --  849* 982* 1,100*   AST  --  1,186* 1,375* 1,529*   PROTTOTAL  --  4.6* 4.8* 4.9*   ALBUMIN 1.8* 1.8* 1.9* 2.0*       Iron Panel - No lab results found.      Imaging:  All imaging studies reviewed by me.     Current Medications:    [START " ON 4/10/2022] amiodarone  200 mg Oral or Feeding Tube BID     [START ON 4/17/2022] amiodarone  200 mg Oral or Feeding Tube Daily     amiodarone  400 mg Oral or Feeding Tube BID     B and C vitamin Complex with folic acid  5 mL Per Feeding Tube Daily     chlorhexidine  15 mL Swish & Spit BID     folic acid  1 mg Per Feeding Tube Daily     pantoprazole  40 mg Oral or Feeding Tube QAM AC     piperacillin-tazobactam  4.5 g Intravenous Q6H     protein modular  2 packet Per Feeding Tube 4x Daily     thiamine  100 mg Per Feeding Tube Daily     vancomycin  1,500 mg Intravenous Q24H       dextrose       dextrose Stopped (04/04/22 0400)     CRRT replacement solution 12.5 mL/kg/hr (04/04/22 1321)     EPINEPHrine Stopped (04/04/22 1300)     heparin (PRESSURE BAG) 2 unit/mL in 0.9% NaCl 6 Units/hr (04/04/22 1300)     HEParin Stopped (04/03/22 1403)     insulin regular Stopped (04/02/22 1010)     - MEDICATION INSTRUCTIONS -       - MEDICATION INSTRUCTIONS -       norepinephrine Stopped (04/04/22 0637)     CRRT replacement solution 200 mL/hr at 04/04/22 1321     CRRT replacement solution 12.5 mL/kg/hr (04/04/22 1321)     Amanda Stahl MD    I was present with the fellow during the history and exam.  I discussed the case with the fellow and agree with the findings as documented in the assessment and plan.    Taisha Abraham MD   of Medicine  Department of Nephrology  [facility]

## 2022-04-04 NOTE — PROGRESS NOTES
"[facility]: [place]  NeuroCritical Care Progress Note    Patient Name:  Michael Callejas  MRN:  [id number]    :  1985  Date of Service:  April 3, 2022  Primary care provider:  No Ref-Primary, Physician      A/P:   Michael Callejas is a 36 year old male admitted on 2022 with a PMH of drug abuse who presents for witnessed cardiac arrest while doing Meth/DMT this afternoon. Pt was down 5 minutes with bystander CPR prior to ems arrival, where he was found to be in PEA. PEA converted to VF->4 epi->4shocks->intubated in field. Taken to cath lab upon arrival to EMS and cannulated for VA ECMO. Initial CT without acute intracranial pathology. No signs of anoxic injury.     Today pt is off sedation and rewarmed. Minimal exam but has reactive and symetric pupils.     Encephalopathic on EEG but with no seizures.       NEURO RECS  - Continue vEEG  - Repeat CT day 3 if able  - Avoid hypotonic solutions as they may worsen cerebral edema  - Avoid \"nitroprusside\",\"nitroglycerin\" as they may also worsen cerbral edema.  - Advise slow correction of any high Sodiums if needed  - We will continue to follow and monitor their EEG and neurologic exam, please call #[phone] with any questions      Physical Examination:   Pulse 97   Temp 98.4  F (36.9  C) (Esophageal)   Resp 14   Ht 1.803 m (5' 11\")   Wt 86.1 kg (189 lb 13.1 oz)   SpO2 99%   BMI 26.47 kg/m    General: Adult male patient, lying in bed, NAD  HEENT: ETT  Cardiac: Tele  Pulm: Vent  Abdomen: Soft, bowel sounds present  Extremities: Warm, no edema  Skin: No rash or lesion     Neuro:  Pt is off sedation and has been rewarmed. Pt will not open eyes or follow commands, Pupils equal and reactive to light. 0/5 in 4/4 to noxious stimulation. -cough/gag.    I have personally reviewed all labs and imaging for this patient.      Patient discussed with attending neurologist, Dr. Alexx Reed, DNP  Ascom: *[phone]  "

## 2022-04-04 NOTE — PROGRESS NOTES
"[facility]: [place]  NeuroCritical Care Progress Note    Patient Name:  Michael Callejas  MRN:  [id number]    :  1985  Date of Service:  April 3, 2022  Primary care provider:  No Ref-Primary, Physician      A/P:   Michael Callejas is a 36 year old male admitted on 2022 with a PMH of drug abuse who presents for witnessed cardiac arrest while doing Meth/DMT this afternoon. Pt was down 5 minutes with bystander CPR prior to ems arrival, where he was found to be in PEA. PEA converted to VF->4 epi->4shocks->intubated in field. Taken to cath lab upon arrival to EMS and cannulated for VA ECMO. Initial CT without acute intracranial pathology. No signs of anoxic injury. However, on repeat imaging, diffuse cerebral edema had developed, though grey-white differentiation was grossly preserved. Hypertonic were thereafter initiated       NEURO RECS  - Continue vEEG  - Continue 3% hypertonic saline  - Avoid hypotonic solutions as they may worsen cerebral edema  - Avoid \"nitroprusside\",\"nitroglycerin\" as they may also worsen cerbral edema.  - Advise slow correction of any high Sodiums if needed  - We will continue to follow and monitor their EEG and neurologic exam, please call #[phone] with any questions      Physical Examination:   Pulse 87   Temp 97.9  F (36.6  C) (Esophageal)   Resp 15   Ht 1.803 m (5' 11\")   Wt 87.5 kg (192 lb 14.4 oz)   SpO2 100%   BMI 26.90 kg/m    General: Adult male patient, lying in bed, NAD  HEENT: ETT  Cardiac: Tele  Pulm: Vent  Abdomen: Soft, bowel sounds present  Extremities: Warm, no edema  Skin: No rash or lesion     Neuro:    Mental Status: Does not awaken to verbal or noxious stimuli. Does not track when eyes are manually opened or follow any commands    Cranial Nerves: Pupils are 2mm and briskly reactive. Has a subtle corneal reflex and weak cough reflex. No facial asymmetry noted     Motor: Normal bulk and tone. No abnormal movements noted. Pt fails " to move spontaneously or withdraw from nox stimuli     Sensory: No withdrawal from noxious stimuli in any extremity     Coordination: Unable to assess     Reflexes: Toes mute bilaterally, no clonus     Gait: Unable to assess    24Hr Events:  - CTH: new cerebral edema  - Started 3% hypertonic saline  - EEG: severe diffuse encephalopathy that mildly improved with discontinuation of hypothermia but remained severe; no epileptiform discharges or seizures    Subjective:  Pt is not able to communicate at this time    IMAGING:  I have personally reviewed all labs and imaging for this patient.    CTH (4/4/2022)  Impression:  New cerebral edema with preserved gray-white  differentiation.     EEG (4/3/2022)  EEG through 6 AM today reviewed.  Hypothermia was reversed.  Continues on fentanyl 200 mcg/h and midazolam 8 mg/h throughout the study.  Findings are severely abnormal.    1) electrocerebral activity was present but was severely attenuated, discontinuous, and unreactive.  Findings are consistent with a severe diffuse encephalopathy.  There was minor improvement in amplitude of electrocerebral activity following discontinuation of hypothermia but results continued consistent with severe diffuse encephalopathy.  2) electrographic seizures or periodic discharges were not seen at any point, even during a brief period of recording following discontinuation of hypothermia.      Patient discussed with attending neurologist, Dr. Brandt Raman MD  Neurology PGY2

## 2022-04-04 NOTE — PROGRESS NOTES
ECMO [place] STUDY    Inotropes/Pressors:  None    Vent Mode: CMV/AC  (Continuous Mandatory Ventilation/ Assist Control)  FiO2 (%): 40 %  Resp Rate (Set): 12 breaths/min  Tidal Volume (Set, mL): 420 mL  PEEP (cm H2O): 5 cmH2O  Resp: 13       Flow  Art BP  IABP BP HR O2 Sat  MVO2  IABP  Flowtrac  4 102/56/77 81/57  96 99  74  1:1  5.6/2.7  3 101/56/75 81/61  88 99  76  1:1  5.6/2.7  2 99/41/68 79/54  90 99  79.6  1:1  5.6/2.7  1 94/49/70 77/57/78 91 99  79.6  1:1  5.6/2.7  1 109/54/68 92/68  92 99  79.1  Standby 4.1/2.0        Feliberto Bucio MD  Cardiology Fellow      April 4, 2022

## 2022-04-04 NOTE — PROGRESS NOTES
ECMO Shift Summary: [id number]    ECMO Equipment:  Console serial number: [id number]  Circuit Lot number: [id number]  Oxygenator Lot number: [id number]    Patient remains on VA ECMO, all equipment is functioning and alarms are appropriately set. RPM's 3600 with flow range ~3.9-4.1 L/min. Sweep gas is at 1.5 LPM and FiO2 50%. Circuit remains free of air. Stable, small amount of fibrin and clot at connector sites. Cannulas are secure; no bleeding from sites. Extremities are warm/perfused. Suctioned ETT for scant secretions.       Significant Shift Events:   Follow-up head CT was done at 0500 today.   RR on ventilator dropped from 14 to 12 at 0630 this morning since patient PaCO2 (39) is still less than Post-oxy PaCO2 (45) meaning that we are hyperventilating the lungs slightly. Goal for patient abg PaCO2 remains 40-50    Vent settings:  Vent Mode: CMV/AC  (Continuous Mandatory Ventilation/ Assist Control)  FiO2 (%): 40 %  Resp Rate (Set): 14 breaths/min  Tidal Volume (Set, mL): 420 mL  PEEP (cm H2O): 5 cmH2O  Resp: 14    Anticoagulation and Volume Status:  Heparin is off, ACT range 169-203 with goal of 180-200.    Urine output is very low; on CRRT and not pulling volume. Blood loss was nil. No blood products given during shift.     Intake/Output Summary (Last 24 hours) at 4/4/2022 0624  Last data filed at 4/4/2022 0600  Gross per 24 hour   Intake 2120.87 ml   Output 344 ml   Net 1776.87 ml       Imaging Studies:  Recent Results (from the past 24 hour(s))   CT Head w/o Contrast    Impression    RESIDENT PRELIMINARY INTERPRETATION  Impression:  Diffuse cerebral edema with preserved gray-white differentiation.     Findings were discussed with Dr. Welch by Dr. Devine via telephone at  5:34 AM.       Labs:  Recent Labs   Lab 04/04/22  0600 04/04/22  0357 04/04/22  0200 04/04/22  0013   PH 7.39 7.38 7.38 7.37   PCO2 37 39 38 38   PO2 78* 96 93 90   HCO3 22 23 22 22   O2PER 40 50  40  40 40 40       Lab Results   Component  Value Date    HGB 11.1 (L) 04/04/2022    PHGB 40 (H) 04/04/2022    PLT 85 (L) 04/04/2022    FIBR 557 (H) 04/04/2022    INR 1.45 (H) 04/04/2022    PTT 43 (H) 04/04/2022    DD 9.78 (H) 04/04/2022    ANTCH 48 (L) 04/03/2022       Plan:  Plan is to continue full VA ECMO support. Potential turn down study today.    Kassidy Lisa, RT  ECMO Specialist  4/4/2022   6:30 AM

## 2022-04-05 NOTE — PROGRESS NOTES
"[facility]: [place]  NeuroCritical Care Progress Note    Patient Name:  Michael Callejas  MRN:  [id number]    :  1985  Date of Service:  April 3, 2022  Primary care provider:  No Ref-Primary, Physician      A/P:   Michael Callejas is a 36 year old male admitted on 2022 with a PMH of drug abuse who presents for witnessed cardiac arrest while doing Meth/DMT this afternoon. Pt was down 5 minutes with bystander CPR prior to ems arrival, where he was found to be in PEA. PEA converted to VF->4 epi->4shocks->intubated in field. Taken to cath lab upon arrival to EMS and cannulated for VA ECMO. Initial CT without acute intracranial pathology. No signs of anoxic injury. However, on repeat imaging, diffuse cerebral edema had developed, though grey-white differentiation was grossly preserved. Hypertonic were thereafter initiated, but has since been discontinued       NEURO RECS  - Continue vEEG  - Avoid hypotonic solutions as they may worsen cerebral edema  - Avoid \"nitroprusside\",\"nitroglycerin\" as they may also worsen cerbral edema.  - Advise slow correction of any high Sodiums if needed  - We will continue to follow and monitor their EEG and neurologic exam, please call #[phone] with any questions      Physical Examination:   Pulse 81   Temp 98.1  F (36.7  C) (Esophageal)   Resp 14   Ht 1.803 m (5' 11\")   Wt 85.3 kg (188 lb 0.8 oz)   SpO2 98%   BMI 26.23 kg/m    General: Adult male patient, lying in bed, NAD  HEENT: ETT  Cardiac: Tele  Pulm: Vent  Abdomen: Soft, bowel sounds present  Extremities: Warm, no edema  Skin: No rash or lesion     Neuro:    Mental Status: Does not awaken to verbal or noxious stimuli. Does not track when eyes are manually opened or follow any commands    Cranial Nerves: Pupils are 2mm and briskly reactive. Weak cough reflex. No facial asymmetry noted     Motor: Normal bulk and tone. No abnormal movements noted. Pt fails to move spontaneously or withdraw " from noxious stimuli     Sensory: No withdrawal from noxious stimuli in any extremity     Coordination: Unable to assess     Reflexes: Toes mute bilaterally, no clonus     Gait: Unable to assess    24Hr Events:  - 3% hypertonic saline stopped overnight  - EEG remains severely encephalopathic w/o any epileptiform activity    Subjective:  Pt is not able to communicate at this time    IMAGING:  I have personally reviewed all labs and imaging for this patient.    CTH (4/4/2022)  Impression:  New cerebral edema with preserved gray-white  differentiation.     EEG (4/3/2022)  EEG through 6 AM today reviewed.  Hypothermia was reversed.  Continues on fentanyl 200 mcg/h and midazolam 8 mg/h throughout the study.  Findings are severely abnormal.    1) electrocerebral activity was present but was severely attenuated, discontinuous, and unreactive.  Findings are consistent with a severe diffuse encephalopathy.  There was minor improvement in amplitude of electrocerebral activity following discontinuation of hypothermia but results continued consistent with severe diffuse encephalopathy.  2) electrographic seizures or periodic discharges were not seen at any point, even during a brief period of recording following discontinuation of hypothermia.      Patient discussed with attending neurologist, Dr. Brandt Raman MD  Neurology PGY2

## 2022-04-05 NOTE — PROGRESS NOTES
Nephrology Progress Note  04/05/2022         Assessment & Recommendations:   Michael Callejas is a 36 year old male with PMH of colon cancer dx'd at age 16 ( per mother no longer resectable), substance use d/o, admitted following out of hospital PEA arrest 2/2 Meth overdose which progressed to VT/Cardiogenic shock requiring VA Ecmo/IABP c/b probable anoxic brain injury and HALEIGH. Nephrology consulted for ongoing dialysis needs.    1. HALEIGH, oligoanuric - ATN in setting of cardiogenic shock and cardiac arrest. Started on CRRT 4/3/22.  - Continue CRRT, 4K, net neg 1-2L/24h as tolerated   - Continue to monitor for renal recovery with daily chemistry labs/ strict I/O and daily weights   - CRRT access is through the ecmo circuit     2. Volume status - Mild hypervolemia. He is intubated. Weight 85.3 kg. Admission weight 84.2 kg. Will start to pull fluid with goal net neg 1-2L   - Continue daily weights/strict I/O     3. Cardiogenic shock - On VA ecmo/IABP. MAPs > 65.    - Per CV     4. Electrolytes - K 4.5, Na 140  - 4K baths on CRRT  - If a higher serum sodium were desired, could start 3% saline peripherally at 50ml/hr which would increase serum sodium to ~145-148     5. Acid base - Bicarb 22     6. Colon Ca? - Needs further evaluation. Perhaps getting care everywhere updated would be helpful. Per mother patient was seeing someone here in [place] who told him it was too late for surgery (this history obtained from initial consult by Angela Salinas).        Recommendations were communicated to primary team via note.     Seen and discussed with Dr. Jarad Stahl MD   Division of Renal Disease and Hypertension  [phone]    Interval History :   Nursing and provider notes from last 24 hours reviewed.  No events overnight. Pt off vasopressors this morning. Ended up slightly net positive yesterday at 173. Concern for pupil changes today & had repeat CT - no significant change per report.       Review of Systems:   RORO  "unobtainable - pt intubated & non-responsive    Physical Exam:   I/O last 3 completed shifts:  In: 2802.12 [I.V.:1237.12; NG/GT:495]  Out: 3437 [Other:3437]   Pulse 89   Temp 97.7  F (36.5  C)   Resp 14   Ht 1.803 m (5' 11\")   Wt 85.3 kg (188 lb 0.8 oz)   SpO2 91%   BMI 26.23 kg/m       GENERAL APPEARANCE: intubated, non-responsive  PULM: lungs coarse bilaterally, mechanically ventilated  CV: peripherally cannulated for VA ECMO, IABP in place     -1+ edema throughout   GI: soft, mildly distended  INTEGUMENT: no cyanosis, no rash  NEURO: not responsive to voice  Access ECMO circuit    Labs:   All labs reviewed by me  Electrolytes/Renal -   Recent Labs   Lab Test 04/05/22  1354 04/05/22  1158 04/05/22  1151 04/05/22  0956 04/05/22  0951 04/05/22  0341 04/05/22  0340 04/04/22  2142 04/04/22  2134   NA  --   --  140  --  140  --  140  --  141  141   POTASSIUM  --   --  4.5  --  4.5  --  4.6  --  4.4  4.4   CHLORIDE  --   --  108  --  109  --  108  --  110*  110*   CO2  --   --  25  --  23  --  24  --  26  26   BUN  --   --  56*  --  53*  --  57*  --  57*  57*   CR  --   --  2.90*  --  2.89*  --  3.00*  --  3.25*  3.25*   * 105* 108*   < > 140*   < > 101*   < > 123*  123*   SAMIR  --   --  7.8*  --  7.6*  --  8.0*  --  7.5*  7.5*   MAG  --   --  2.3  --  2.4*  --  2.3  --  2.1   PHOS  --   --  4.9*  --   --   --  4.8*  --  4.8*    < > = values in this interval not displayed.       CBC -   Recent Labs   Lab Test 04/05/22  1151 04/05/22  0951 04/05/22  0340   WBC 16.7* 16.4* 17.9*   HGB 8.8* 9.6* 9.5*   PLT 63* 60* 61*       LFTs -   Recent Labs   Lab Test 04/05/22  1151 04/05/22  0951 04/05/22  0340 04/04/22  2134   ALKPHOS  --  102 89 86   BILITOTAL  --  0.9 0.9 0.9   ALT  --  690* 740* 773*   AST  --  893* 983* 1,045*   PROTTOTAL  --  5.0* 5.0* 4.7*   ALBUMIN 1.9* 1.9* 1.9* 1.9*  1.9*       Iron Panel - No lab results found.      Imaging:  All imaging studies reviewed by me.     Current " Medications:    [START ON 4/10/2022] amiodarone  200 mg Oral or Feeding Tube BID     [START ON 4/17/2022] amiodarone  200 mg Oral or Feeding Tube Daily     amiodarone  400 mg Oral or Feeding Tube BID     B and C vitamin Complex with folic acid  5 mL Per Feeding Tube Daily     chlorhexidine  15 mL Swish & Spit BID     folic acid  1 mg Per Feeding Tube Daily     pantoprazole  40 mg Oral or Feeding Tube QAM AC     piperacillin-tazobactam  4.5 g Intravenous Q6H     protein modular  2 packet Per Feeding Tube 4x Daily     thiamine  100 mg Per Feeding Tube Daily     vancomycin  1,500 mg Intravenous Q24H       dextrose       dextrose Stopped (04/04/22 0400)     CRRT replacement solution 12.5 mL/kg/hr (04/05/22 1214)     EPINEPHrine Stopped (04/04/22 1300)     heparin (PRESSURE BAG) 2 unit/mL in 0.9% NaCl       HEParin       insulin regular Stopped (04/02/22 1010)     niCARdipine (CARDENE) infusion ADULT/PEDS GREATER than or EQUAL to 45 kg max conc 5 mg/hr (04/05/22 1500)     - MEDICATION INSTRUCTIONS -       norepinephrine Stopped (04/04/22 0637)     CRRT replacement solution 200 mL/hr at 04/04/22 1321     CRRT replacement solution 12.5 mL/kg/hr (04/05/22 1214)     Amanda Stahl MD    I was present with the fellow during the history and exam.  I discussed the case with the fellow and agree with the findings as documented in the assessment and plan.    Taisha Abraham MD   of Medicine  Department of Nephrology  [facility]

## 2022-04-05 NOTE — PROGRESS NOTES
Major Shift Events:  Patient remains off sedation, no response to stimuli. VA ECMO continued, 4.34 lpm, 3600 rpm, 0.5 sweep, 80%. IABP 1:1, 100%. CRRT paused, will continue to check labs.  23% sodium bolus given x2. 3% sodium gtt initiated. Repeat head CT completed.   Plan: Continue with plan of care.   For vital signs and complete assessments, please see documentation flowsheets.

## 2022-04-05 NOTE — PROGRESS NOTES
CRRT STATUS NOTE    DATA:  Time:  5:16 AM  Pressures WNL:  YES  Filter Status:  WDL    Problems Reported/Alarms Noted:  None    Supplies Present:  YES    ASSESSMENT:  Patient Net Fluid Balance: -239 ml since midnight, +173 ml for 4/4  Vital Signs:  T 98.1 HR 86 /69 (94) RR 13 spO2 97%  Labs:  Cr 3 ALT//1045 trop I 57,241 WBC 17.9 plt 61 PTT 39 fibrinogen 630  Goals of Therapy:  I=O, if tolerated could aim for net neg 0.5L/24h    INTERVENTIONS:   None needed this shift.    PLAN:  Continue CRRT per plan of care. Contact CRRT RN @ [phone] with questions/concerns.

## 2022-04-05 NOTE — PROGRESS NOTES
ECMO Shift Summary:     ECMO Equipment:  Console serial number: [id number]  Circuit Lot number: [id number]  Oxygenator Lot number: [id number]    Patient remains on VA ECMO, all equipment is functioning and alarms are appropriately set. RPM's 3600 with flow range ~3.9-4.1 L/min. Sweep gas is at 1 LPM and FiO2 40%. Circuit remains free of air. Stable, small amount of fibrin and clot at connector sites. Cannulas are secure; no bleeding from sites. Extremities are warm/perfused. Suctioned ETT for moderate amount of creamy/tan secretions.       Significant Shift Events:   Platelets continue to decrease. Patient has not been on any heparin since 1400 on 4/3/22. RN also notes petechiae on patients back. Should send HIT panel and switch to Bivalirudin if patient needs anticoagulation restarted.     Vent settings:  Vent Mode: CMV/AC  (Continuous Mandatory Ventilation/ Assist Control)  FiO2 (%): 40 %  Resp Rate (Set): 12 breaths/min  Tidal Volume (Set, mL): 420 mL  PEEP (cm H2O): 5 cmH2O  Resp: 12    Anticoagulation and Volume Status:  No anticoagulation currently. ACT range 177-194 with goal of 180-200.    Urine output is very low; on CRRT and pulling volume. Blood loss was nil. No blood products given during shift.     Intake/Output Summary (Last 24 hours) at 2022 0610  Last data filed at 2022 0600  Gross per 24 hour   Intake 2873.31 ml   Output 3450 ml   Net -576.69 ml       Imaging Studies:  Recent Results (from the past 24 hour(s))   Echocardiogram Limited    Narrative    [id number]  [id number]  [id number]  [id number]^PAULINA^DONNA     [facility],[place]  Echocardiography Laboratory  [address] [place] [id number]     Name: TIERNEY CARVALHO  MRN: [id number]  : 1985  Study Date: 2022 03:20 PM  Age: 36 yrs  Gender: Male  Patient Location: [facility]  Reason For Study: Cardiac Arrest  Ordering Physician: DONNA GALLARDO  Performed By: Lawanda Odom RDCS     BSA: 2.1  m2  Height: 71 in  Weight: 192 lb  HR: 88  BP: 103/60 mmHg  ______________________________________________________________________________  Procedure  Limited Portable Echo Adult.  ______________________________________________________________________________  Interpretation Summary  Limited VA ECMO turndown study.  Baseline (4.0 L/min+IABP): Small LV cavity size (LVIDd 4.3 cm) and mildly  reduced LV function, LVEF=40-45%. RV size and function are grossly normal on  limited views. Septum midline.The aortic valve opens with each cardiac cycle.     With turndown to 1.0 L/min and IABP paused, LVEF improves to 55-60%. LV cavity  size and septal position do not change significantly. The aortic valve opens  with each cardiac cycle at all flow rates.  This study was compared with the study from 4/2/2022: Baseline LVEF has  improved dramatically and aortic valve now opens; LVEF improves further with  turndown and pausing the intra-aortic balloon pump.  ______________________________________________________________________________  Aortic Valve  The aortic valve opens at all pump speeds.     Vessels  Venous ECMO cannula is seen traversing the IVC and RA.     Pericardium  No pericardial effusion is present.     Compared to Previous Study  This study was compared with the study from 4/2/2022 . Baseline LVEF has  improved dramatically and aortic valve now opens; LVEF improves further with  turndown and pausing the intra-aortic balloon pump.     ______________________________________________________________________________  Report approved by: Jacki Hernandez 04/04/2022 04:15 PM     ______________________________________________________________________________      XR Chest Port 1 View    Narrative    EXAM: XR CHEST PORT 1 VIEW  4/4/2022 6:22 PM     HISTORY:  PICC placement       COMPARISON:  Same day 0058 hours    FINDINGS:     Portable supine view of the chest. Left upper extremity PICC tip  projects over the low SVC.  Esophageal temperature probe projects over  the mid thoracic esophagus. Stable position of ECMO cannula, IABP  marker. Feeding tube tip courses below the diaphragm and beyond the  field of view.      Trachea is midline. Cardiomediastinal silhouette and pulmonary  vasculature are within normal limits. Left costophrenic angle is  excluded. No focal airspace opacity, pleural effusion or appreciable  pneumothorax.    No acute osseous abnormality. Visualized upper abdomen is  unremarkable.        Impression    IMPRESSION:   1. Left upper extremity PICC tip projects over the lower SVC.  2. Other support devices in similar positions.    I have personally reviewed the examination and initial interpretation  and I agree with the findings.    MIGUEL BARR MD         SYSTEM ID:  [id number]       Labs:  Recent Labs   Lab 04/05/22  0532 04/05/22  0341 04/05/22  0131 04/04/22  2347   PH 7.40 7.43 7.41 7.42   PCO2 39 37 38 38   PO2 98 122* 114* 118*   HCO3 24 25 24 24   O2PER 40 50  40  40 40 40       Lab Results   Component Value Date    HGB 9.5 (L) 04/05/2022    PHGB 50 (H) 04/05/2022    PLT 61 (L) 04/05/2022    FIBR 636 (H) 04/05/2022    INR 1.10 04/05/2022    PTT 39 (H) 04/05/2022    DD 5.69 (H) 04/05/2022    ANTCH 41 (L) 04/04/2022       Plan:  Plan is to continue full VA ECMO support.     Kassidy Lisa, RT  ECMO Specialist  4/5/2022   6:11 AM

## 2022-04-05 NOTE — PROVIDER NOTIFICATION
"Notified CARD's crosscover fellow that pt's MAP on IABP were 90's-100's, MAPs had previously been 80/90's, per MD continue to monitor \"could be transient, pt was on pressors yesterday morning\". Asked MD if MAP's >100 were concerning for ICP/possible anoxic injury, per MD \"BP parameters were not set\". Will continue to monitor and notify MD with further concerns.     "

## 2022-04-05 NOTE — PROGRESS NOTES
[facility]  Cardiac ICU Progress Note  April 1, 2022         Subjective:   Reviewed events from last 24 hours.  No acute events overnight.  Good results from ECMO turn down yesterday. Remains off sedation and pressors/inotropes.           H&P:   Mr Michael Callejas is a 36 year old adult male who was admitted on 4/1/2022. Patient was brought by EMS who gave the following report. EMS was called at 12:54 pm to the patient's residence where he had suffered a presumed cardiac arrest after taking methamphetamine and DMT. Per EMS, CPR was administered by room mates. Unclear how long down time prior TO room mates administered CPR or to EMS call. On arrival, EMS noted patient to be in PEA arrest with CPR continued and patient was given total 4 doses of Epi, once of bicarb, one of calcium and 3 doses of Narcan (one ?inhaled, 2 IV). He was also shocked 4 times by EMS for periods of VFib electrical activity enroute to the hospital. Of note, ROSC was never achieved by EMS. On presentation to the Cath lab, patient was in asystole with DENI delivered CPRS continued. Initial blood gas showed a pH of 6.7, O2 of 36. He was cannulated for VA ECMO at 2:08 pm. Post cannulation patient was alternating between PEA and VF with additional 3 total 200 J shocks delivered. Post cannulation coronary angiogram showed clean coronary arteries. Epi, Norepi, Vaso and Phenylephrine was started at high doses and patient was given multiple boluses of Amiodarone. Additionally, an intra-aortic balloon pump and thermogard was placed with patient transported to CT for brain imaging prior to arriving to the ICU for continued management.      Witnessed arest (y/n): yes  Home or public location (y/n): home  Bystander CPR (y/n): yes  Time of 911 call: 12.54 PM  Initial rhythm: PEA  Did they have intermittent ROSC (y/n): no  # of shocks: by EMS: 4,by cath lab 3  Epi:   4  Amps  Amio: none in the field, 2 boluses in the cath lab  "  Bicarb:  1 amps  EtCO2 en route: unknwon  O2 sat en route:unknown, O2 on initial ABG on presentation was 36    Initial rhythm in the cath lab: PEA  First ABG: pH 6.7 CO2: 116  O2: 36  ECMO cannula size: 17 Fr to right femoral artery ? Fr to right femoral vein  Meds given in the cath lab: Epi, NorEpi, Vaso, Phenyl, Amio, 400 meq of sodium Bicarb  Initial PA catheter numbers: N/A           Past Medical History:   No past medical history on file.         Family History:   No family history on file.         Social History:     Social History     Socioeconomic History     Marital status: Single     Spouse name: Not on file     Number of children: Not on file     Years of education: Not on file     Highest education level: Not on file   Occupational History     Not on file   Tobacco Use     Smoking status: Not on file     Smokeless tobacco: Not on file   Substance and Sexual Activity     Alcohol use: Not on file     Drug use: Not on file     Sexual activity: Not on file   Other Topics Concern     Not on file   Social History Narrative     Not on file     Social Determinants of Health     Financial Resource Strain: Not on file   Food Insecurity: Not on file   Transportation Needs: Not on file   Physical Activity: Not on file   Stress: Not on file   Social Connections: Not on file   Intimate Partner Violence: Not on file   Housing Stability: Not on file            Medications:   I have reviewed this patient's current medications  No medications prior to admission.            Review of Systems:   Unable to obtain ROS due to pt's obtunded status            Physical Exam:   Vital signs:  Temp: 98.1  F (36.7  C) Temp src: Esophageal   Pulse: 81   Resp: 14 SpO2: 98 % O2 Device: Mechanical Ventilator   Height: 180.3 cm (5' 11\") Weight: 85.3 kg (188 lb 0.8 oz)  Estimated body mass index is 26.23 kg/m  as calculated from the following:    Height as of this encounter: 1.803 m (5' 11\").    Weight as of this encounter: 85.3 kg (188 " lb 0.8 oz).       GENERAL: intubated and sedated  HEENT: ET tube in place  CV: S1/S2 heard with IABP murmur  RESPIRATORY: distant breath sounds  GI: obese, soft, no bowel sounds on asucultation   EXTREMITIES: 1+ peripheral edema. 2+ bilateral pedal pulses.   NEUROLOGIC: not oriented to place or time, does not follow commands  MUSCULOSKELETAL: No joint swelling or tenderness.   SKIN: No jaundice. No acute rashes or lesions.             Data:   Labs pending    Recent Results (from the past 24 hour(s))   Echocardiogram Limited    Narrative    [id number]  [id number]  [id number]  [id number]^PAULINA^DONNA     [facility]  Echocardiography Laboratory  [address] [place] [address]     Name: TIERNEY CARVALHO  MRN: [id number]  : 1985  Study Date: 2022 03:20 PM  Age: 36 yrs  Gender: Male  Patient Location: [facility]  Reason For Study: Cardiac Arrest  Ordering Physician: DONNA GALLARDO  Performed By: Lawanda Odom RDCS     BSA: 2.1 m2  Height: 71 in  Weight: 192 lb  HR: 88  BP: 103/60 mmHg  ______________________________________________________________________________  Procedure  Limited Portable Echo Adult.  ______________________________________________________________________________  Interpretation Summary  Limited VA ECMO turndown study.  Baseline (4.0 L/min+IABP): Small LV cavity size (LVIDd 4.3 cm) and mildly  reduced LV function, LVEF=40-45%. RV size and function are grossly normal on  limited views. Septum midline.The aortic valve opens with each cardiac cycle.     With turndown to 1.0 L/min and IABP paused, LVEF improves to 55-60%. LV cavity  size and septal position do not change significantly. The aortic valve opens  with each cardiac cycle at all flow rates.  This study was compared with the study from 2022: Baseline LVEF has  improved dramatically and aortic valve now opens; LVEF improves further with  turndown and pausing the intra-aortic balloon  pump.  ______________________________________________________________________________  Aortic Valve  The aortic valve opens at all pump speeds.     Vessels  Venous ECMO cannula is seen traversing the IVC and RA.     Pericardium  No pericardial effusion is present.     Compared to Previous Study  This study was compared with the study from 4/2/2022 . Baseline LVEF has  improved dramatically and aortic valve now opens; LVEF improves further with  turndown and pausing the intra-aortic balloon pump.     ______________________________________________________________________________  Report approved by: Jacki Hernandez 04/04/2022 04:15 PM     ______________________________________________________________________________      XR Chest Port 1 View    Narrative    EXAM: XR CHEST PORT 1 VIEW  4/4/2022 6:22 PM     HISTORY:  PICC placement       COMPARISON:  Same day 0058 hours    FINDINGS:     Portable supine view of the chest. Left upper extremity PICC tip  projects over the low SVC. Esophageal temperature probe projects over  the mid thoracic esophagus. Stable position of ECMO cannula, IABP  marker. Feeding tube tip courses below the diaphragm and beyond the  field of view.      Trachea is midline. Cardiomediastinal silhouette and pulmonary  vasculature are within normal limits. Left costophrenic angle is  excluded. No focal airspace opacity, pleural effusion or appreciable  pneumothorax.    No acute osseous abnormality. Visualized upper abdomen is  unremarkable.        Impression    IMPRESSION:   1. Left upper extremity PICC tip projects over the lower SVC.  2. Other support devices in similar positions.    I have personally reviewed the examination and initial interpretation  and I agree with the findings.    MIGUEL BARR MD         SYSTEM ID:  [id number]   XR Chest Port 1 View    Narrative    EXAMINATION:  XR CHEST PORT 1 VIEW 4/5/2022 1:13 AM.    COMPARISON: 4/4/2022    HISTORY:  Check endotracheal tube  placement and ECLS cannula  placement. DO NOT log-roll patient.  Place film under patient using  patient safety handling process.    FINDINGS: Frontal view. Unchanged endotracheal tube, ECMO cannula,  IABP, gastric tube, temperature probe, left arm PICC. Cardiac  silhouette within normal limits. No pleural effusion or pneumothorax.  Increased hazy opacities in the left lung base.      Impression    IMPRESSION:   1. Stable support devices.  2. Slightly increased hazy left basilar opacities which could  represent edema or infection.    I have personally reviewed the examination and initial interpretation  and I agree with the findings.    MIGUEL BARR MD         SYSTEM ID:  [id number]              Assessment and Plan:   Changes today:  -Mild pupil changes. Will give hold CRRT, give 23% hypertonic saline x2 and get stat CT head  -Plan for ECMO turn down today  -Dropping plts and not requiring heparin to maintain ACT goal. HIT panel sent, will change heparin to bival   -Keep off sedation and keep assessing neurologic status  -Continues to be off pressors  -CRRT per Nephrology  -PICC placed yesterday and plan to take out thermoguard sheath today  -Will increase antibiotic duration to     Neurology: # Cerebral Edema  # Concern for Anoxic Brain injury  # Possible Encephalopathy  Intubated, sedated, paralyzed.   Cooled to 34 degrees. CT Head (4/1) unremarkable. Completely warmed on 4/2 night (11pm-midnight).    - Continue to monitor neuro status off sedation  - Not on hypertonic saline for cerebral edema since pt is on CRRT and will not achieve hypernatremia  - High concern for anoxic brain injury given persistently low O2 sats during field CPR and PAO2 of 36 on arrival. Guarded prognosis in terms of neurologic recovery at this point.  -Neurocrit following, appreciate recs   Cardiovascular / Hemodynamics: # PEA arrest complicated by VF  # Cardiogenic shock with profound vasoplegia and hypotension  # Drug induced cardiac  arrest  #ECMO Cannulation (4/1)  #IABP Placement (4/1)  No CAD on coronary angiogram.  Peripheral V-A ECMO inserted for cardiopulmonary support. IABP insert for maximal support.  TTE (4/2): LVEF 5-10% with partially opening AV.   Positive results from turndown on 4/4  --ECMO: 3600RPM, Flow 4LPM, Sweep 1 LPM, FIO2 40%  -Off genevieve/pressors since 4/4  --PO amio with taper (400mg BID for 1 week and then 200mg BID x 1 week then 200mg daily x week then off)  --ACT goal 180-200  --hold lipitor for now given likely hepatic injury during arrest  --hold ACE/ARB for now given likely reduced renal fxn after arrest  --holding beta blocker given shock      Pulmonary: # Acute Hypoxic Respiratory failure  ETT in place at ~2 cm above the lizabeth.    Vent Mode: CMV/AC  (Continuous Mandatory Ventilation/ Assist Control)  FiO2 (%): 40 %  Resp Rate (Set): 12 breaths/min  Tidal Volume (Set, mL): 420 mL  PEEP (cm H2O): 5 cmH2O  Resp: 14    CXR: Lines in stable position. Remarkably clear. Currently low suspicion for aspiration   --Permissive hypercapnia with CO2 target 40-50  --wean vent as able  --daily CXR  --Q2h ABGs for now  --consider scheduled duonebs if signs of lung dz, currently PRN     GI and Nutrition: # Shock liver secondary to cardiac arrest-Resolving  No known medical hx.   --monitor BID LFTs  --Tube feeds at goal  --bowel regimen - on hold for now due to hypothermia  --GI Prophylaxis: PPI    Renal, Fluid and Electrolytes: #Acute Renal Failure  # Severe lactic acidosis  #Hypernatremia  #Hyperkalemia  Nephrology consulted.   --CRRT initiated (4/3)  --monitor urine output  --maintain K~3 and Mg>2    Infectious Disease: # Possible aspiration penumonia  No signs of infection. Leukocytosis c/w arrest. Blood cultures collected. Sputum culture (4/1) unremarkable.  --vancomycin/zosyn x7 days for ECMO (4/1-4/7)  --daily blood cultures  --monitor for signs of infection given cooling, lines, and leukocytosis   Hematology and Oncology: #  Possible Coagulopathy  Received heparin while in the cath lab  Will continue heparin for ECMO to maintain ACT goal.    --Sent HIT panel and will change heparin to bival  --cryo PRN fibrinogen < 200; FFP for INR >2  --Transfuse for Hgb<10  --heparin gtt for ECMO with ACT goal 180-200  --DVT PPX: Heparin as above   Endocrinology: --HgbA1c 5.3   --On insulin gtt   Lines: L Basilic PICC April 4, 2022  R femoral arterial (17Fr) and venous (25 Fr)ECMO cannulae April 1, 2022  L femoral arterial line (IABP) April 1, 2022  R radial arterial line April 1, 2022  ETT 7.5mm April 1, 2022  Lott catheter April 1, 2022  OG tube April 1, 2022  Restraint: needed    Current lines are required for patient management       Family update by me today: Yes   Mother, Dayana was updated by me. She lives in [place]. Contact number: [phone].   Per mother, patient has an aunt, Teri Jiménez,  who lives in [place], contact number: [phone]    Code Status: Full confirmed with mother on the phone.       The Pt was discussed and evaluated with Dr. Jennifer MD, attending physician, who agrees with the assessment and plan above.     Feliberto Bucio MD  Cardiology Fellow

## 2022-04-05 NOTE — PROGRESS NOTES
PALLIATIVE CARE SOCIAL WORK Progress Note   [facility] [address]    REFERRAL SOURCE: Palliative Care Team; ECMO referral    Team attempted x3 today to connect with family of patient, unsuccesful. Will follow up again 4/6.     Plan: PSCW will continue to follow while Palliative Care Team involved and support requested.    Melba Neri [facility]  Palliative Care   Pager [phone]    [facility] Inpatient Team Consult pager [phone] (M-F 8-4:30)  After-hours Answering Service [phone]

## 2022-04-05 NOTE — PROGRESS NOTES
ECMO Attending Progress Note  2022    Michael Callejas is a 36 year old male who was cannulated for ECMO 2022 due to refractory PEA cardiac arrest with intermittent VF requiring 4 shocks.    Cannulation Site:  17 Fr in the R femoral artery  25 Fr in the R femoral vein    Interval events: stable pulsatility, no pressors    Pulsatilty (IABP paused if applicable):50 mmHG     Physical Exam:  Temp:  [97.7  F (36.5  C)-98.6  F (37  C)] 98.6  F (37  C)  Pulse:  [] 96  Resp:  [12-] 26  MAP:  [72 mmHg-99 mmHg] 86 mmHg  Arterial Line BP: ()/(52-73) 114/64  FiO2 (%):  [40 %] 40 %  SpO2:  [91 %-100 %] 94 %    Intake/Output Summary (Last 24 hours) at 2022 1718  Last data filed at 2022 1700  Gross per 24 hour   Intake 2837.42 ml   Output 2962 ml   Net -124.58 ml        Vent Mode: CMV/AC  (Continuous Mandatory Ventilation/ Assist Control)  FiO2 (%): 40 %  Resp Rate (Set): 12 breaths/min  Tidal Volume (Set, mL): 120 mL  PEEP (cm H2O): 5 cmH2O  Resp: 26       Labs:  Recent Labs   Lab 22  1651 22  1601 22  1600 22  1350 22  1151 22  0950   PH  --  7.44  --  7.43 7.42 7.41   PCO2  --  35  --  36 40 38   PO2  --  66*  --  60* 128* 100   HCO3  --  24  --  24 26 24   O2PER 40 40  40 40 40 40 40      Recent Labs   Lab 22  1604 22  1151 22  0951 22  0340   WBC 17.2* 16.7* 16.4* 17.9*   HGB 9.7* 8.8* 9.6* 9.5*     Creatinine   Date Value Ref Range Status   2022 3.37 (H) 0.66 - 1.25 mg/dL Final   2022 2.90 (H) 0.66 - 1.25 mg/dL Final   2022 2.89 (H) 0.66 - 1.25 mg/dL Final   2022 3.00 (H) 0.66 - 1.25 mg/dL Final   Blood Flow (Circuit) LPM: 4.27 LPM  Gas Flow  LPM: 1 LPM  Gas FiO2   %: 40 %  ACT  (seconds): 228 seconds  Blood Temp  (degrees C): 37 C  Pulse Oximetry  (SpO2%): 94 %  Arterial Pressure  mmH mmHg    ECMO Issues including assessments and plan on DOS 2022:  Neuro: Sedated for mechanical ventilation and ECMO.   No acute distress.  NIRS stable b/l  RASS goal: -3  CV: Cardiogenic shock.  Hemodynamically stable on no pressors  Pulm: Keep vent settings at rest settings as above.  FEN/Renal: CRRT  Heme: ACT goal: 180-200, Hemoglobin stable.  Minimal oozing around the ECMO cannulas.  ID: Receiving empiric antibiotics  Cannulae: Position is acceptable on exam and the available imaging.  Distal perfusion cannula is in place and patent.  Extremities are well-perfused.     I have personally reviewed the ECMO flows, oxygenation and CO2 clearance, anticoagulation, and cannula position.  I have also personally assessed the patient's systemic response with hemodynamics, oxygenation, ventilation, and bleeding.       The patient requires continued ECMO support and management in the ICU.  I have discussed patient care and treatment plan with the primary team.      Robert Rodriguez MD, PhD  Interventional/Critical Care Cardiology  [phone]    April 5, 2022

## 2022-04-05 NOTE — PLAN OF CARE
Major Shift Events: Neuro: Unchanged. CV: MAP goal >65, maintained without intervention. MD notified that pt's MAPs 's, see provider notification note. VA ECMO continued, see ECMO Progress Note. Resp: Unchanged, vent settings continued. RR 12-16. Slight cough intermittently with suctioning. : CRRT continued, see I&O flowsheet. No acute concerns noted overnight.   Plan: Continue to monitor.   For vital signs and complete assessments, please see documentation flowsheets.

## 2022-04-05 NOTE — PROGRESS NOTES
ECMO Attending Progress Note  2022    Michael Callejas is a 36 year old male who was cannulated for ECMO 2022 due to refractory PEA cardiac arrest with intermittent VF requiring 4 shocks.    Cannulation Site:  17 Fr in the R femoral artery  25 Fr in the R femoral vein    Interval events: improved pressor requirements, improved pulsatility during turndown today    Pulsatilty (IABP paused if applicable):40 mmHG     Physical Exam:  Temp:  [97.7  F (36.5  C)-98.8  F (37.1  C)] 97.9  F (36.6  C)  Pulse:  [82-98] 84  Resp:  [12-17] 12  MAP:  [64 mmHg-89 mmHg] 85 mmHg  Arterial Line BP: ()/(44-62) 111/62  FiO2 (%):  [40 %] 40 %  SpO2:  [96 %-100 %] 98 %    Intake/Output Summary (Last 24 hours) at 2022 2336  Last data filed at 2022 2300  Gross per 24 hour   Intake 2744.57 ml   Output 2571 ml   Net 173.57 ml    Vent Mode: CMV/AC  (Continuous Mandatory Ventilation/ Assist Control)  FiO2 (%): 40 %  Resp Rate (Set): 12 breaths/min  Tidal Volume (Set, mL): 420 mL  PEEP (cm H2O): 5 cmH2O  Resp: 12       Labs:  Recent Labs   Lab 22  1940 22  1750 22  1601 22  1546   PH 7.40 7.43 7.39  --  7.39   PCO2 39 37 40  --  41   PO2 96 102 94  --  98   HCO3 24 24 24  --  25   O2PER 40 40 40 40  50 40      Recent Labs   Lab 22  2134 22  1602 22  1209 22  1002   WBC 17.9* 18.5* 19.2* 18.5*   HGB 9.6* 9.7* 10.1* 10.2*     Creatinine   Date Value Ref Range Status   2022 3.25 (H) 0.66 - 1.25 mg/dL Final   2022 3.25 (H) 0.66 - 1.25 mg/dL Final   2022 3.32 (H) 0.66 - 1.25 mg/dL Final   2022 3.41 (H) 0.66 - 1.25 mg/dL Final     Blood Flow (Circuit) LPM: 4.04 LPM  Gas Flow  LPM: 1.5 LPM  Gas FiO2   %: 50 %  ACT  (seconds): 190 seconds  Blood Temp  (degrees C): 36.6 C  Pulse Oximetry  (SpO2%): 99 %  Arterial Pressure  mmH mmHg    ECMO Issues including assessments and plan on DOS 2022:  Neuro: Sedated for mechanical ventilation and  ECMO.  No acute distress.  NIRS stable b/l  RASS goal: -3  CV: Cardiogenic shock.  Hemodynamically stable on no pressors  Pulm: Keep vent settings at rest settings as above.  FEN/Renal: CRRT  Heme: ACT goal: 180-200, Hemoglobin stable .  Minimal oozing around the ECMO cannulas.  ID: Receiving empiric antibiotics  Cannulae: Position is acceptable on exam and the available imaging.  Distal perfusion cannula is in place and patent.  Extremities are well-perfused.     I have personally reviewed the ECMO flows, oxygenation and CO2 clearance, anticoagulation, and cannula position.  I have also personally assessed the patient's systemic response with hemodynamics, oxygenation, ventilation, and bleeding.       The patient requires continued ECMO support and management in the ICU.  I have discussed patient care and treatment plan with the primary team.      Robert Rodriguez MD, PhD  Interventional/Critical Care Cardiology  [phone]    April 4, 2022

## 2022-04-05 NOTE — PROGRESS NOTES
ECMO Shift Summary:[id number]      ECMO Equipment:  Console serial number: [id number]  Circuit Lot number: [id number]  Oxygenator Lot number: [id number]      Patient remains on VA ECMO, all equipment is functioning and alarms are appropriately set. RPM's 3600 with flow range 4.13-4.30 L/min. Sweep gas is at 1.0 LPM and FiO2 40%. Circuit remains free of air, clot. Fibrin strand at venous connector. Cannulas are secure with no bleeding from site. Extremities are warm and perfused.    Significant Shift Events: Pt. Had CT scan this afternoon. Had Bivaloruden infusing and stopped at 1445 for . HIT panel is negative    Vent settings:  Vent Mode: CMV/AC  (Continuous Mandatory Ventilation/ Assist Control)  FiO2 (%): 40 %  Resp Rate (Set): 12 breaths/min  Tidal Volume (Set, mL): 420 mL  PEEP (cm H2O): 5 cmH2O  Resp: 26  .    Heparin is off   Urine output is minimal CRRT is stopped currently, No blood loss  No product given        Intake/Output Summary (Last 24 hours) at 4/5/2022 1740  Last data filed at 4/5/2022 1700  Gross per 24 hour   Intake 2837.42 ml   Output 2962 ml   Net -124.58 ml       ECHO:  No results found for this or any previous visit.  No results found for this or any previous visit.      CXR:  Recent Results (from the past 24 hour(s))   XR Chest Port 1 View    Narrative    EXAM: XR CHEST PORT 1 VIEW  4/4/2022 6:22 PM     HISTORY:  PICC placement       COMPARISON:  Same day 0058 hours    FINDINGS:     Portable supine view of the chest. Left upper extremity PICC tip  projects over the low SVC. Esophageal temperature probe projects over  the mid thoracic esophagus. Stable position of ECMO cannula, IABP  marker. Feeding tube tip courses below the diaphragm and beyond the  field of view.      Trachea is midline. Cardiomediastinal silhouette and pulmonary  vasculature are within normal limits. Left costophrenic angle is  excluded. No focal airspace opacity, pleural effusion or  appreciable  pneumothorax.    No acute osseous abnormality. Visualized upper abdomen is  unremarkable.        Impression    IMPRESSION:   1. Left upper extremity PICC tip projects over the lower SVC.  2. Other support devices in similar positions.    I have personally reviewed the examination and initial interpretation  and I agree with the findings.    MIGUEL BARR MD         SYSTEM ID:  [id number]   XR Chest Port 1 View    Narrative    EXAMINATION:  XR CHEST PORT 1 VIEW 4/5/2022 1:13 AM.    COMPARISON: 4/4/2022    HISTORY:  Check endotracheal tube placement and ECLS cannula  placement. DO NOT log-roll patient.  Place film under patient using  patient safety handling process.    FINDINGS: Frontal view. Unchanged endotracheal tube, ECMO cannula,  IABP, gastric tube, temperature probe, left arm PICC. Cardiac  silhouette within normal limits. No pleural effusion or pneumothorax.  Increased hazy opacities in the left lung base.      Impression    IMPRESSION:   1. Stable support devices.  2. Slightly increased hazy left basilar opacities which could  represent edema or infection.    I have personally reviewed the examination and initial interpretation  and I agree with the findings.    MIGUEL BARR MD         SYSTEM ID:  [id number]   CT Head w/o Contrast    Narrative    CT HEAD W/O CONTRAST 4/5/2022 2:58 PM    History: ECMO patient with tubular changes  ICD-10:    Comparison: CT head 4/4/2022    Technique: Using multidetector thin collimation helical acquisition  technique, axial, coronal and sagittal CT images from the skull base  to the vertex were obtained without intravenous contrast.   (topogram) image(s) also obtained and reviewed.    Findings: There is no intracranial hemorrhage, mass effect, or midline  shift. Stable diffuse cerebral edema with near complete sulcal  effacement. Gray/white matter differentiation in both cerebral  hemispheres is preserved. Unchanged effacement of the ventricles  and  basal cisterns. The left uncus is medially deviated greater than the  right. No patrick transtentorial herniation.    The bony calvaria and the bones of the skull base are normal.  Scattered mucosal thickening of the paranasal sinuses. The mastoid air  cells are clear.      Impression    Impression:  1. Unchanged diffuse cerebral edema with continued preservation of  gray-white matter differentiation.  2. Effacement of the basilar cisterns without patrick transtentorial  herniation.    I have personally reviewed the examination and initial interpretation  and I agree with the findings.    AIMEE DOMINIQUE MD         SYSTEM ID:  [id number]       Labs:  Recent Labs   Lab 04/05/22  1651 04/05/22  1601 04/05/22  1600 04/05/22  1350 04/05/22  1151 04/05/22  0950   PH  --  7.44  --  7.43 7.42 7.41   PCO2  --  35  --  36 40 38   PO2  --  66*  --  60* 128* 100   HCO3  --  24  --  24 26 24   O2PER 40 40  40 40 40 40 40       Lab Results   Component Value Date    HGB 9.7 (L) 04/05/2022    PHGB 50 (H) 04/05/2022    PLT 62 (L) 04/05/2022    FIBR 653 (H) 04/05/2022    INR 1.19 (H) 04/05/2022    PTT 47 (H) 04/05/2022    DD 4.36 (H) 04/05/2022    ANTCH 48 (L) 04/05/2022         Plan is continue as is with no CRT until K is 6. Want to keep Na level around 160      Oliva Dixon, RT  ECMO Specialist  4/5/2022 5:40 PM

## 2022-04-05 NOTE — PROGRESS NOTES
CRRT STATUS NOTE    DATA:  Time:  5:38 PM  Pressures WNL:  YES  Filter Status:  WDL    Problems Reported/Alarms Noted:  None reported    Supplies Present:  YES    ASSESSMENT:  Patient Net Fluid Balance:    Intake/Output Summary (Last 24 hours) at 4/5/2022 1739  Last data filed at 4/5/2022 1700  Gross per 24 hour   Intake 2837.42 ml   Output 2962 ml   Net -124.58 ml       Vital Signs:  Temp: 98.6  F (37  C) Temp src: Oral   Pulse: 96   Resp: 26 SpO2: 94 % O2 Device: Mechanical Ventilator      Labs:    Lab Results   Component Value Date    WBC 17.2 04/05/2022     Lab Results   Component Value Date    RBC 2.96 04/05/2022     Lab Results   Component Value Date    HGB 9.7 04/05/2022     Lab Results   Component Value Date    HCT 29.6 04/05/2022     No components found for: MCT  Lab Results   Component Value Date     04/05/2022     Lab Results   Component Value Date    MCH 32.8 04/05/2022     Lab Results   Component Value Date    MCHC 32.8 04/05/2022     Lab Results   Component Value Date    RDW 14.8 04/05/2022     Lab Results   Component Value Date    PLT 62 04/05/2022     Last Comprehensive Metabolic Panel:  Sodium   Date Value Ref Range Status   04/05/2022 144 133 - 144 mmol/L Final     Potassium   Date Value Ref Range Status   04/05/2022 4.6 3.4 - 5.3 mmol/L Final   04/02/2022 2.6 (LL) 3.4 - 5.3 mmol/L Final     Chloride   Date Value Ref Range Status   04/05/2022 112 (H) 94 - 109 mmol/L Final     Carbon Dioxide (CO2)   Date Value Ref Range Status   04/05/2022 21 20 - 32 mmol/L Final     Anion Gap   Date Value Ref Range Status   04/05/2022 11 3 - 14 mmol/L Final     Glucose   Date Value Ref Range Status   04/05/2022 126 (H) 70 - 99 mg/dL Final   04/05/2022 121 (H) 70 - 99 mg/dL Final     GLUCOSE BY METER POCT   Date Value Ref Range Status   04/05/2022 125 (H) 70 - 99 mg/dL Final     Urea Nitrogen   Date Value Ref Range Status   04/05/2022 62 (H) 7 - 30 mg/dL Final     Creatinine   Date Value Ref Range Status    04/05/2022 3.37 (H) 0.66 - 1.25 mg/dL Final     GFR Estimate   Date Value Ref Range Status   04/05/2022 24 (L) >60 mL/min/1.73m2 Final     Comment:     Effective December 21, 2021 eGFRcr in adults is calculated using the 2021 CKD-EPI creatinine equation which includes age and gender (Christine dow al., NEJ, DOI: 10.1056/LASLbo1990135)     Calcium   Date Value Ref Range Status   04/05/2022 7.7 (L) 8.5 - 10.1 mg/dL Final       INTERVENTIONS:   Checked in with bedside RN    PLAN:   Off CRRT for now. Checking q2hr BMP's. If k>6, then restart CRRT over night

## 2022-04-06 PROBLEM — N17.0 ACUTE KIDNEY FAILURE WITH TUBULAR NECROSIS (H): Status: ACTIVE | Noted: 2022-01-01

## 2022-04-06 NOTE — PROGRESS NOTES
"[facility]  Palliative Care Social Work Note:    Patient Info:  Michael Callejas is a 36 year old male with a PMH of drug abuse who presents for witnessed cardiac arrest while doing Meth/DMT. Pt was down 5 minutes with bystander CPR prior to ems arrival, where he was found to be in PEA. PEA converted to VF->4 epi->4shocks->intubated in field. Taken to cath lab upon arrival to EMS and cannulated for VA ECMO. Initial CT without acute intracranial pathology. No signs of anoxic injury. However, on repeat imaging, diffuse cerebral edema had developed, though grey-white differentiation was grossly preserved. Hypertonic were thereafter initiated.    Brief summary of visit: Phone intake completed today with Palliative Care CNS and pt's sig other, Tamika. Introduced role of Palliative Care, supports and services available. Tamika shared she had been up to see Michael but after today, designated visitors would be pt's mom and brother who Tamika would be picking up from the airport later today. Tamika confirmed that Michael has three young children from a previous relationship, youngest is 2 months old, other ages unknown. Tamika shared that Michael enjoys cars and was \"always there to help others.\" Support provided to Tamika as she also shared a health scare with one of her own adult sons earlier in the week.      Date of Admission: 4/1/2022    Reason for consult: Patient and family support    Sources of information: Other - sig vish Lundberg    Recommendations & Plan:  Palliative Care Team will continue to follow, assist with goals of care conversations, provide emotional support/assessment of family coping.     These recommendations have been discussed with PC Team, unit RNCC.    Symptoms & Concerns Addressed Today:  Family - family dynamics discussed; Tamika shared that pt and mother have not seen each other for approx 10 years    Strengths Identified:    Continue to assess    Relationships & " Support:  Aspects of relationships and support assessed today:    Identified family members: Mom, Dayana; brother (name not known); father Ruddy; sig other Tamika; ex/mother of children, Kacey    Professional supports: NA    Family coping: Continue to assess    Bereavement Risk concerns: Continue to assess    Coping, Mental Health & Adjustment to Illness:   Substance Use    Goals, Decision Making & Advance Care Planning:   Prognosis, Goals, and/or Advance Care Planning were assessed today: No  If yes, brief summary of discussion: NA  Preferred language: English  Patient's decision making preferences: unable to assess  I have concerns about the patient/family's health literacy today: Unable to assess today  Patient has a completed Health Care Directive: No.   Code status per chart review: Full Code    Clinical Social Work Interventions:   Introduction of Palliative clinical social work interventions  Facilitation of processing of thoughts/feelings      Melba Neri [facility]  Palliative Care   Pager [phone]    [facility] Inpatient Team Consult pager [phone] (M-F 8-4:30)  After-hours Answering Service [phone]

## 2022-04-06 NOTE — PLAN OF CARE
Major Shift Events:  Remains critically stable on ECMO, vent, IABP. No major events this shift.  Plan: Continue to monitor.  For vital signs and complete assessments, please see documentation flowsheets.

## 2022-04-06 NOTE — PROGRESS NOTES
ECMO Shift Summary:    ECMO Equipment:  Console serial number: [id number]  Circuit Lot number: [id number]  Oxygenator Lot number: [id number]    Patient remains on VA ECMO, all equipment is functioning and alarms are appropriately set. RPM's 3600 with flow range 4.2-4.4 L/min. Sweep gas is at 0.5 LPM and FiO2 60%. The circuit remains free of air, clot and fibrin. Cannulas are secure with no bleeding from the site. Extremities are warm to touch and perfused.     Significant Shift Events:   Unable to draw blood from right radial arterial line. Overnight physician removed that line and placed a left radial arterial line.  Patient has a low sitting mixing cloud due to strong pulsatility.   Heparin restarted overnight.    Vent settings:  Vent Mode: CMV/AC  (Continuous Mandatory Ventilation/ Assist Control)  FiO2 (%): 60 %  Resp Rate (Set): 12 breaths/min  Tidal Volume (Set, mL): 420 mL  PEEP (cm H2O): 5 cmH2O  Resp: 23  .    Heparin is running at 600 u/hr, ACT range 173-207.    Urine output is inadequate, blood loss was minimal. No blood product given overnight.       Intake/Output Summary (Last 24 hours) at 4/6/2022 0609  Last data filed at 4/6/2022 0600  Gross per 24 hour   Intake 3706.69 ml   Output 691 ml   Net 3015.69 ml       ECHO:  No results found for this or any previous visit.  No results found for this or any previous visit.      CXR:  Recent Results (from the past 24 hour(s))   CT Head w/o Contrast    Narrative    CT HEAD W/O CONTRAST 4/5/2022 2:58 PM    History: ECMO patient with tubular changes  ICD-10:    Comparison: CT head 4/4/2022    Technique: Using multidetector thin collimation helical acquisition  technique, axial, coronal and sagittal CT images from the skull base  to the vertex were obtained without intravenous contrast.   (topogram) image(s) also obtained and reviewed.    Findings: There is no intracranial hemorrhage, mass effect, or midline  shift. Stable diffuse cerebral edema with near  complete sulcal  effacement. Gray/white matter differentiation in both cerebral  hemispheres is preserved. Unchanged effacement of the ventricles and  basal cisterns. The left uncus is medially deviated greater than the  right. No patrick transtentorial herniation.    The bony calvaria and the bones of the skull base are normal.  Scattered mucosal thickening of the paranasal sinuses. The mastoid air  cells are clear.      Impression    Impression:  1. Unchanged diffuse cerebral edema with continued preservation of  gray-white matter differentiation.  2. Effacement of the basilar cisterns without patrick transtentorial  herniation.    I have personally reviewed the examination and initial interpretation  and I agree with the findings.    AIMEE DOMINIQUE MD         SYSTEM ID:  [id number]   XR Chest Port 1 View    Impression    RESIDENT PRELIMINARY INTERPRETATION  IMPRESSION:  Endotracheal tube tip now projects over the mid trachea.       Labs:  Recent Labs   Lab 04/06/22  0341 04/06/22  0340 04/06/22  0203 04/06/22  0202 04/05/22  2352 04/05/22  2351 04/05/22  2213   PH 7.40  --  7.42  --  7.43  --  7.42   PCO2 31*  --  30*  --  31*  --  33*   PO2 102  --  83  --  86  --  70*   HCO3 19*  --  20*  --  20*  --  22   O2PER 60  80  60 60 60 60 60   < > 60    < > = values in this interval not displayed.       Lab Results   Component Value Date    HGB 8.7 (L) 04/06/2022    PHGB <30 04/06/2022    PLT 62 (L) 04/06/2022    FIBR 655 (H) 04/06/2022    INR 1.17 (H) 04/06/2022    PTT 45 (H) 04/06/2022    DD 3.67 (H) 04/06/2022    ANTCH 48 (L) 04/05/2022         Plan is to continue VA ECMO support and adjust settings as needed.      Anton Simms, RT  ECMO Specialist  4/6/2022 6:42 AM

## 2022-04-06 NOTE — PROGRESS NOTES
Nephrology Progress Note  04/06/2022         Assessment & Recommendations:   Michael Callejas is a 36 year old male with PMH of colon cancer dx'd at age 16 ( per mother no longer resectable), substance use d/o, admitted following out of hospital PEA arrest 2/2 Meth overdose which progressed to VT/Cardiogenic shock requiring VA Ecmo/IABP c/b probable anoxic brain injury and HALEIGH. Nephrology consulted for ongoing dialysis needs.    1. HALEIGH, oligoanuric - ATN in setting of cardiogenic shock and cardiac arrest. Started on CRRT 4/3/22.  - Recommend resuming CRRT, 4K, net neg 1-2L/24h as tolerated  - Continue to monitor for renal recovery with daily chemistry labs/ strict I/O and daily weights  - CRRT access is through the ecmo circuit     2. Volume status - Mild hypervolemia. He is intubated. Weight 87.7 kg. Admission weight 84.2 kg. Will start to pull fluid as tolerated.   - Continue daily weights/strict I/O     3. Cardiogenic shock - On VA ecmo/IABP. MAPs > 65.    - Per CV     4. Electrolytes - K 4.5, Na 152  - 4K baths on CRRT  - Current 3% rate of 70ml/hr should maintain a serum sodium of ~150 to compensate for the sodium concentration of the dialysate. Please contact Nephrology if sodium goals change & we can assist in adjusting fluid rates as needed    If a higher serum sodium were desired, could start 3% saline peripherally at 50ml/hr which would increase serum sodium to ~145-148     5. Acid base - Bicarb 22     6. Colon Ca? - Needs further evaluation. Perhaps getting care everywhere updated would be helpful. Per mother patient was seeing someone here in [place] who told him it was too late for surgery (this history obtained from initial consult by Angela Salinas).        Recommendations were communicated to primary team via note.     Seen and discussed with Dr. Jarad Stahl MD   Division of Renal Disease and Hypertension  [phone]    Interval History :   Nursing and provider notes from last 24 hours  "reviewed.  CRRT stopped due to concern for inability to reach sodium goals. Discussed with primary team today & will resume. CT Head obtained yesterday without significant change.       Review of Systems:   ROS unobtainable - pt intubated & non-responsive    Physical Exam:   I/O last 3 completed shifts:  In: 3914.49 [I.V.:2349.49; NG/GT:530]  Out: 691 [Other:691]   Pulse 101   Temp 98.6  F (37  C)   Resp (!) 44   Ht 1.803 m (5' 11\")   Wt 87.7 kg (193 lb 5.5 oz)   SpO2 97%   BMI 26.97 kg/m       GENERAL APPEARANCE: intubated, non-responsive  PULM: lungs coarse bilaterally, mechanically ventilated  CV: peripherally cannulated for VA ECMO, IABP in place     -1+ edema throughout   GI: soft, mildly distended  INTEGUMENT: no cyanosis, no rash  NEURO: not responsive to voice  Access ECMO circuit    Labs:   All labs reviewed by me  Electrolytes/Renal -   Recent Labs   Lab Test 04/06/22  1151 04/06/22  1150 04/06/22  0954 04/06/22  0952 04/06/22  0756 04/06/22  0609 04/06/22  0340 04/05/22  2351 04/05/22  2212   *  --  151*  --  149*   < > 146*   < > 145*  145*   POTASSIUM 4.8  --  4.9  --  4.8   < > 4.6   < > 4.6  4.6   CHLORIDE 123*  --  122*  --  120*   < > 117*   < > 115*  115*   CO2 16*  --  17*  --  17*   < > 20   < > 20  20   *  --  101*  --  98*   < > 90*   < > 80*  80*   CR 5.23*  --  4.90*  --  4.92*   < > 4.26*   < > 4.01*  4.01*   * 136* 125*   < > 144*   < > 150*  141*   < > 124*  124*  116*   SAMIR 7.4*  --  7.8*  --  7.7*   < > 7.5*   < > 8.2*  8.2*   MAG 2.7*  --  2.7*  --   --   --  2.5*  --  2.6*   PHOS 4.9*  --   --   --   --   --  5.0*  --  5.0*    < > = values in this interval not displayed.       CBC -   Recent Labs   Lab Test 04/06/22  1151 04/06/22  0954 04/06/22  0340   WBC 17.7* 17.8* 19.9*   HGB 8.1* 8.2* 8.7*   PLT 69* 66* 62*       LFTs -   Recent Labs   Lab Test 04/06/22  1151 04/06/22  0954 04/06/22  0340 04/05/22  2212   ALKPHOS  --  134 127 126   BILITOTAL  " --  0.6 0.6 0.7   ALT  --  533* 562* 616*   AST  --  559* 650* 728*   PROTTOTAL  --  5.3* 5.2* 5.4*   ALBUMIN 1.9* 1.9* 1.9* 1.9*  1.9*       Iron Panel - No lab results found.      Imaging:  All imaging studies reviewed by me.     Current Medications:    [START ON 4/10/2022] amiodarone  200 mg Oral or Feeding Tube BID     [START ON 4/17/2022] amiodarone  200 mg Oral or Feeding Tube Daily     amiodarone  400 mg Oral or Feeding Tube BID     B and C vitamin Complex with folic acid  5 mL Per Feeding Tube Daily     chlorhexidine  15 mL Swish & Spit BID     folic acid  1 mg Per Feeding Tube Daily     pantoprazole  40 mg Oral or Feeding Tube QAM AC     piperacillin-tazobactam  2.25 g Intravenous Q6H     protein modular  2 packet Per Feeding Tube 4x Daily     thiamine  100 mg Per Feeding Tube Daily     vancomycin place marin - receiving intermittent dosing  1 each Does not apply See Admin Instructions       dextrose       dextrose Stopped (04/04/22 0400)     CRRT replacement solution 12.5 mL/kg/hr (04/05/22 1214)     EPINEPHrine Stopped (04/04/22 1300)     heparin (PRESSURE BAG) 2 unit/mL in 0.9% NaCl 6 Units/hr (04/06/22 1300)     HEParin 600 Units/hr (04/06/22 1300)     insulin regular Stopped (04/02/22 1010)     niCARdipine (CARDENE) infusion ADULT/PEDS GREATER than or EQUAL to 45 kg max conc 7.5 mg/hr (04/06/22 1300)     - MEDICATION INSTRUCTIONS -       norepinephrine Stopped (04/04/22 0637)     CRRT replacement solution 200 mL/hr at 04/04/22 1321     CRRT replacement solution 12.5 mL/kg/hr (04/05/22 1214)     sodium chloride 3% 70 mL/hr at 04/06/22 0828     Amanda Stahl MD    I was present with the fellow during the history and exam.  I discussed the case with the fellow and agree with the findings as documented in the assessment and plan.    Taisha Abraham MD   of Medicine  Department of Nephrology  [facility]

## 2022-04-06 NOTE — PHARMACY-VANCOMYCIN DOSING SERVICE
"Pharmacy Vancomycin Note  Date of Service 2022  Patient's  1985   36 year old, male    Indication: Aspiration Pneumonia  Day of Therapy: 6  Current vancomycin regimen:  Intermittent dosing  Current vancomycin monitoring method: Trough (Method 2 = manual dose calculation)  Current vancomycin therapeutic monitoring goal: 15-20 mg/L    Current estimated CrCl = Estimated Creatinine Clearance: 24.7 mL/min (A) (based on SCr of 5.23 mg/dL (H)).    Creatinine for last 3 days  4/3/2022:  3:54 PM Creatinine 4.27 mg/dL;  7:52 PM Creatinine 4.10 mg/dL;  9:59 PM Creatinine 3.96 mg/dL  2022:  3:58 AM Creatinine 3.26 mg/dL; 10:02 AM Creatinine 3.44 mg/dL; 12:09 PM Creatinine 3.41 mg/dL;  4:02 PM Creatinine 3.32 mg/dL;  9:34 PM Creatinine 3.25 mg/dL;  9:34 PM Creatinine 3.25 mg/dL  2022:  3:40 AM Creatinine 3.00 mg/dL;  9:51 AM Creatinine 2.89 mg/dL; 11:51 AM Creatinine 2.90 mg/dL;  4:04 PM Creatinine 3.37 mg/dL;  6:07 PM Creatinine 3.61 mg/dL;  8:03 PM Creatinine 3.79 mg/dL; 10:12 PM Creatinine 4.01 mg/dL; 10:12 PM Creatinine 4.01 mg/dL; 11:51 PM Creatinine 4.26 mg/dL  2022:  2:02 AM Creatinine 4.50 mg/dL;  3:40 AM Creatinine 4.26 mg/dL;  6:09 AM Creatinine 4.76 mg/dL;  7:56 AM Creatinine 4.92 mg/dL;  9:54 AM Creatinine 4.90 mg/dL; 11:51 AM Creatinine 5.23 mg/dL    Recent Vancomycin Levels (past 3 days)  2022:  9:54 AM Vancomycin 31.0 mg/L    Vancomycin IV Administrations (past 72 hours)                   vancomycin 1500 mg in 0.9% NaCl 250 ml intermittent infusion 1,500 mg (mg) 1,500 mg New Bag 22 1138     1,500 mg New Bag 22 1234                Nephrotoxins and other renal medications (From now, onward)    Start     Dose/Rate Route Frequency Ordered Stop    22 0000  piperacillin-tazobactam (ZOSYN) 2.25 g vial to attach to  ml bag        Note to Pharmacy: For [facility], [facility] and [facility]: For Zosyn-naive patients, use the \"Zosyn initial dose + extended infusion\" order panel.    2.25 " g  over 30 Minutes Intravenous EVERY 6 HOURS 04/05/22 2049 04/08/22 2359 04/05/22 2048  vancomycin place marin - receiving intermittent dosing         1 each Does not apply SEE ADMIN INSTRUCTIONS 04/05/22 2048 04/08/22 2047 04/01/22 1730  norepinephrine (LEVOPHED) 16 mg in  mL infusion MAX CONC CENTRAL LINE         0.01-0.6 mcg/kg/min × 88 kg (Dosing Weight)  0.8-49.5 mL/hr  Intravenous CONTINUOUS 04/01/22 1709               Contrast Orders - past 72 hours (72h ago, onward)            None          Interpretation of levels and current regimen:  Vancomycin level is reflective of supratherapeutic level    Has serum creatinine changed greater than 50% in last 72 hours: Yes    Urine output:  anuric    Renal Function: ARF on Dialysis however CRRT held currently    Plan:  1. No dose indicated at this time, will check another level 4/7am  2. Vancomycin monitoring method: Trough (Method 2 = manual dose calculation)  3. Vancomycin therapeutic monitoring goal: 15-20 mg/L  4. Pharmacy will check vancomycin levels as appropriate in 1-3 Days.  5. Serum creatinine levels will be ordered daily for the first week of therapy and at least twice weekly for subsequent weeks.    Esme Sr, PharmD

## 2022-04-06 NOTE — PROGRESS NOTES
ECMO Shift Summary: 4613-8014      ECMO Equipment:    Console serial number: [id number]  Circuit Lot number: [id number]  Oxygenator Lot number: [id number]    Patient remains on VA ECMO, all equipment is functioning and alarms are appropriately set. RPM's 3600 with flow range 4.2-4.3 L/min. Sweep gas is at 3 LPM and FiO2 100%. Circuit remains free of air, clot and fibrin. Cannulas are secure with no bleeding from site. Extremities are warm in the right and cool. Suctioned ETT for  Minimal amount of secretions.    Significant Shift Events: Nothing this shift    Vent settings:  Vent Mode: CMV/AC  (Continuous Mandatory Ventilation/ Assist Control)  FiO2 (%): 60 %  Resp Rate (Set): 12 breaths/min  Tidal Volume (Set, mL): 420 mL  PEEP (cm H2O): 5 cmH2O  Resp: (!) 32  .    Heparin is running at 400 u/hr, ACT range 190-203.    Urine output is good, blood loss was 0 this shift. No Product given this shift.       Intake/Output Summary (Last 24 hours) at 4/6/2022 1739  Last data filed at 4/6/2022 1700  Gross per 24 hour   Intake 4970.42 ml   Output 84 ml   Net 4886.42 ml       ECHO:  No results found for this or any previous visit.  No results found for this or any previous visit.      CXR:  Recent Results (from the past 24 hour(s))   XR Chest Port 1 View    Narrative    EXAMINATION:  XR CHEST PORT 1 VIEW 4/6/2022 12:55 AM.    COMPARISON: 4/5/2022    HISTORY:  Check endotracheal tube placement and ECLS cannula  placement.     FINDINGS: Frontal view. Endotracheal tube tip projects over the high  thoracic trachea near the thoracic inlet, 8 cm above the lizabeth.  Remainder of support devices are unchanged. Unchanged cardiac  silhouette. Right lung remains clear. Continued hazy left lung  opacities.      Impression    IMPRESSION:   Endotracheal tube tip near the thoracic inlet, 8 cm above the lizabeth.    Findings discussed with patient's nurse Vanessa.     I have personally reviewed the examination and initial interpretation  and I  agree with the findings.    MIGUEL BARR MD         SYSTEM ID:  [id number]   XR Chest Port 1 View    Narrative    EXAMINATION:  XR CHEST PORT 1 VIEW 4/6/2022 4:27 AM.    COMPARISON: Earlier same day    HISTORY:  ETT placement after advancement    FINDINGS: Frontal view of the chest. Interval advancement of the  endotracheal tube, now projecting over the midthoracic trachea.  Remainder of support devices are stable. Unchanged cardiac silhouette  and pulmonary opacities.      Impression    IMPRESSION:  Endotracheal tube tip now projects over the mid trachea.    I have personally reviewed the examination and initial interpretation  and I agree with the findings.    MIGUEL BARR MD         SYSTEM ID:  [id number]   XR Chest Port 1 View    Narrative    EXAM: XR CHEST PORT 1 VIEW  4/6/2022 1:32 PM    HISTORY: 36 years Male IABP placement.     COMPARISON: Same day chest radiographs.    TECHNIQUE: Portable frontal view of the chest.    FINDINGS:   Endotracheal tube tip is at the mid thoracic trachea. Temperature  probe is at the mid esophagus. Left arm PICC tip is at the low SVC.  Partially visualized enteric tube sidehole projects over the stomach.  IABP is at the descending aorta. Right lower approach ECMO cannula is  at the mid SVC.    Midline trachea. Unchanged cardiomediastinal silhouette. Prominent  pulmonary vasculature. No discernible pneumothorax. Low lung volumes.  Unchanged left basilar/retrocardiac haziness. Unremarkable upper  abdomen. No acute or suspicious osseous abnormalities. Unremarkable  soft tissues.      Impression    IMPRESSION:   1.  IABP is at the descending aorta. Additional support devices as  above.  2.  Persistent retrocardiac/left basilar haziness compatible with  pleural effusion, less likely pulmonary edema or consolidation.    AARON WYATT,     I have personally reviewed the examination and initial interpretation  and I agree with the findings.    REJI KEARNEY MD          SYSTEM ID:  [id number]   Echocardiogram Limited    Narrative    [id number]  [id number]  [id number]  [id number]^PAULINA^DONNA     [facility]  Echocardiography Laboratory  [address] [place] [id number]     Name: TIERNEY CARVALHO  MRN: [id number]  : 1985  Study Date: 2022 03:31 PM  Age: 36 yrs  Gender: Male  Patient Location: [facility]  Reason For Study: Cardiac Arrest  Ordering Physician: DONNA GALLARDO  Performed By: Lawanda Odom RDCS     BSA: 2.1 m2  Height: 71 in  Weight: 193 lb  BP: 108/71 mmHg  ______________________________________________________________________________  Procedure  Limited Portable Echo Adult.  ______________________________________________________________________________  Interpretation Summary  Limited TTE for VA-ECMO turndown     At baseline flow (4.3 L/min + IABP), the LVEF is 60-65%. The LVEDD is 4.5 cm.  The inteventricular septum is midline. The AV opens with each beat. The RV  function is normal  At the lowest flow (1.5 L/min without IABP), the LVEF is >70%. The LVEDD is  4.5 cm. The inteventricular septum is midline. The AV opens with each beat.  The RV function is hypercontractile.  This study was compared with the study from 2022. The biventricular  function has normalized.  ______________________________________________________________________________  Atria  Both atria appear normal.     Mitral Valve  Morphologically normal. Not assessed with Doppler.     Tricuspid Valve  Morphologically normal. Not assessed with Doppler.     Pulmonic Valve  The pulmonic valve cannot be assessed.     Vessels  There is a VA-ECMO cannula in the IVC. Unable to assess mean RA pressure given  the patient is on a ventilator.     Pericardium  No pericardial effusion is present.     Compared to Previous Study  This study was compared with the study from 2022 . The biventricular  function has normalized.      ______________________________________________________________________________  Report approved by: Jacki Hernandez 04/06/2022 04:53 PM     ______________________________________________________________________________          Labs:  Recent Labs   Lab 04/06/22  1618 04/06/22  1615 04/06/22  1613 04/06/22  1607 04/06/22  1540 04/06/22  1422 04/06/22  1151 04/06/22  1150 04/06/22  0953   PH 7.34*  --   --   --   --  7.40  --  7.41 7.41   PCO2 32*  --   --   --   --  28*  --  27* 28*   PO2 63*  --   --   --   --  99  --  99 82   HCO3 17*  --   --   --   --  17*  --  17* 18*   O2PER 60  60  60 60 60 60   < > 60  60   < > 60 60  60    < > = values in this interval not displayed.       Lab Results   Component Value Date    HGB 8.3 (L) 04/06/2022    PHGB <30 04/06/2022    PLT 78 (L) 04/06/2022    FIBR 697 (H) 04/06/2022    INR 1.15 04/06/2022    PTT 39 (H) 04/06/2022    DD 3.57 (H) 04/06/2022    ANTCH 54 (L) 04/06/2022         Plan is CT tomorrow.      Oliva Dixon, RT  ECMO Specialist  4/6/2022 5:39 PM

## 2022-04-06 NOTE — PROGRESS NOTES
"[facility]: [place]  NeuroCritical Care Progress Note    Patient Name:  Michael Callejas  MRN:  [id number]    :  1985  Date of Service:  April 3, 2022  Primary care provider:  No Ref-Primary, Physician      A/P:   Michael Callejas is a 36 year old male admitted on 2022 with a PMH of drug abuse who presents for witnessed cardiac arrest while doing Meth/DMT this afternoon. Pt was down 5 minutes with bystander CPR prior to ems arrival, where he was found to be in PEA. PEA converted to VF->4 epi->4shocks->intubated in field. Taken to cath lab upon arrival to EMS and cannulated for VA ECMO. Initial CT without acute intracranial pathology. No signs of anoxic injury. However, on repeat imaging, diffuse cerebral edema had developed, though grey-white differentiation was grossly preserved. Hypertonic were thereafter initiated, but has since been discontinued       NEURO RECS  - Continue vEEG  - Continue 3% hypertonic saline 75% for goal Na > 150; NCC to titrate  - Avoid hypotonic solutions as they may worsen cerebral edema  - Avoid \"nitroprusside\",\"nitroglycerin\" as they may also worsen cerbral edema.  - Advise slow correction of any high Sodiums if needed  - We will continue to follow and monitor their EEG and neurologic exam, please call #[phone] with any questions      Physical Examination:   Pulse 97   Temp 98.6  F (37  C)   Resp (!) 40   Ht 1.803 m (5' 11\")   Wt 87.7 kg (193 lb 5.5 oz)   SpO2 96%   BMI 26.97 kg/m    General: Adult male patient, lying in bed, NAD  HEENT: ETT  Cardiac: Tele  Pulm: Vent  Abdomen: Soft, bowel sounds present  Extremities: Warm, no edema  Skin: No rash or lesion     Neuro:    Mental Status: Does not awaken to verbal or noxious stimuli. Does not track when eyes are manually opened or follow any commands    Cranial Nerves: Pupils are 2mm and briskly reactive. Weak cough reflex. No facial asymmetry noted     Motor: Normal bulk and tone. No abnormal " movements noted. Pt fails to move spontaneously or withdraw from noxious stimuli     Sensory: No withdrawal from noxious stimuli in any extremity     Coordination: Unable to assess     Reflexes: Toes mute bilaterally, no clonus     Gait: Unable to assess    24Hr Events:  - 3% hypertonic saline restarted overnight  - EEG remains severely encephalopathic w/o any epileptiform activity after speaking with Dr Carlisle today    Subjective:  Pt is not able to communicate at this time    IMAGING:  I have personally reviewed all labs and imaging for this patient.    CTH (4/4/2022)  Impression:  New cerebral edema with preserved gray-white  differentiation.     EEG (4/3/2022)  EEG through 6 AM today reviewed.  Hypothermia was reversed.  Continues on fentanyl 200 mcg/h and midazolam 8 mg/h throughout the study.  Findings are severely abnormal.    1) electrocerebral activity was present but was severely attenuated, discontinuous, and unreactive.  Findings are consistent with a severe diffuse encephalopathy.  There was minor improvement in amplitude of electrocerebral activity following discontinuation of hypothermia but results continued consistent with severe diffuse encephalopathy.  2) electrographic seizures or periodic discharges were not seen at any point, even during a brief period of recording following discontinuation of hypothermia.      Patient discussed with attending neurologist, Dr. Brandt Raman MD  Neurology PGY2

## 2022-04-06 NOTE — PLAN OF CARE
Major Shift Events:  CRRT restarted for fluid removal, goal net negative 1-2L/24 hours. ECMO Turndown study done: 4.4L down to 1.5L with IABP off, tolerated well. Vent settings changed due to pt significantly overbreathing vent.     Labs/Protocols: Hypernatremia, goal Na 150. Q 2 hour BMP, ABG, VBG and Lactic Acid. (VBG and Lactic via R internal jugular line). No product replacement needed this shift. Calcium replaced x1 per orders.   Neuro: Unresponsive. (Off all sedation 4/3 at 1000). No response to stimuli. Pupils remain reactive. Afebrile.   Cardiac: SR/ST, HR 90s-100s. MAP goal >65 and <100, on/off nicardipine gtt. R Femoral VA ECMO, 60%, sweep 0.5, flow 4.2-4.4L, speed 3600. L Femoral IABP 1:1, 100%. Doppler pulses throughout.   Respiratory: SIMV 60%, 400, RR 6, PS 5, PEEP 5. Pt overbreathes vent, RR 20s-40s throughout shift. Coarse/diminished lung sounds. Moderate amount of creamy yellow secretions via ETT. Bloody oral secretions with clots, MD aware, gauze in place, frequent oral cares.   GI/: OG with TF at goal. Last BM 4/3. External catheter in place, no urine this shift. CRRT via ECMO circuit.   Skin: Abrasion to mid chest, LEXII.   LDAs: R internal jugular venous line. L radial A line.  L triple lumen PICC. R femoral VA ECMO cannulation. L femoral IABP. Bilateral PIVs.   GTTs: Nicardipine (on/off), 3% NaCl, Heparin (via ECMO circuit)  Diet: TF at 45cc/hr with 30cc water flush Q 4 hours.   Activity: Bedrest. Repositioned Q 2 hours throughout shift for skin integrity.   Teaching: Patient's mother (Dayana) and brother (Chuck) updated on patient condition and plan of care at bedside this shift.     Plan: Continue current support and plan of care. Resume vEEG monitoring tomorrow, following plan of care to be determined by neuro status.   For vital signs and complete assessments, please see documentation flowsheets.     Goal Outcome Evaluation:    Plan of Care Reviewed With: patient     Overall Patient  Progress: no change

## 2022-04-06 NOTE — PROCEDURES
PROCEDURE:   Ultrasound guided Left Radial artery line placement.     INDICATION: Blood pressure monitoring, shock    PROCEDURE :   Cherri Vallejo MD, PhD    SUPERVISOR:  Robert Rodriguez MD, PhD    CONSENT: Obtained and in the paper chart    UNIVERSAL PROTOCOL: Patient Identification was verified, time out was performed, site marking was done. Imaging data reviewed. Full Barrier precaution done: Hands washed, mask, gloves, gown, cap and eye protection all used.     PROCEDURE SUMMARY:   An Ian's test showed good flow through both the radial and ulnar arteries.  The patient was prepped and draped in the usual sterile manner using chlorhexidine scrub. 1% lidocaine was used to numb the region. The left radial artery was palpated and visualized on ultrasound.  The artery was successfully cannulated on the first pass. Pulsatile, arterial blood was visualized and the artery was then threaded with a guide wire using the Seldinger technique.  The needle was removed and ultrasound visualized the wire in the artery, a catheter was threaded over the wire, the wire was removed, and the catheter was sutured into place. A good wave-form was obtained. The patient tolerated the procedure well without any immediate complications. The area was cleaned and a dressing was applied.     ESTIMATED BLOOD LOSS: 5mL    Cherri Vallejo MD, PhD  Cardiology Fellow  Pager: [phone]

## 2022-04-06 NOTE — PROGRESS NOTES
ECMO Shift Summary:[id number]      ECMO Equipment:    Console serial number: [id number]  Circuit Lot number: [id number]  Oxygenator Lot number: [id number]    Patient remains on VA ECMO, all equipment is functioning and alarms are appropriately set. RPM's 3600 with flow range 4.2-4.3 L/min. Sweep gas is at 0.5 LPM and FiO2 60%. Circuit remains free of air, clot with some fibrin at connectors. Cannulas are secure with no bleeding from site. Extremities are warm. Suctioned ETT for tan secretions.    Significant Shift Events: Turndown today with ECHO. Tolerated well.    Vent settings:  Vent Mode: CMV/AC  (Continuous Mandatory Ventilation/ Assist Control)  FiO2 (%): 60 %  Resp Rate (Set): 12 breaths/min  Tidal Volume (Set, mL): 420 mL  PEEP (cm H2O): 5 cmH2O  Resp: (!) 32  .    Heparin is running at 600 u/hr, ACT range 190-203.    Urine output is 0. On CRT started at 1515, blood loss was none. No product given.       Intake/Output Summary (Last 24 hours) at 4/6/2022 1754  Last data filed at 4/6/2022 1700  Gross per 24 hour   Intake 4970.42 ml   Output 84 ml   Net 4886.42 ml       ECHO:  No results found for this or any previous visit.  No results found for this or any previous visit.      CXR:  Recent Results (from the past 24 hour(s))   XR Chest Port 1 View    Narrative    EXAMINATION:  XR CHEST PORT 1 VIEW 4/6/2022 12:55 AM.    COMPARISON: 4/5/2022    HISTORY:  Check endotracheal tube placement and ECLS cannula  placement.     FINDINGS: Frontal view. Endotracheal tube tip projects over the high  thoracic trachea near the thoracic inlet, 8 cm above the lizabeth.  Remainder of support devices are unchanged. Unchanged cardiac  silhouette. Right lung remains clear. Continued hazy left lung  opacities.      Impression    IMPRESSION:   Endotracheal tube tip near the thoracic inlet, 8 cm above the lizabeth.    Findings discussed with patient's nurse Vanessa.     I have personally reviewed the examination and initial  interpretation  and I agree with the findings.    MIGUEL BARR MD         SYSTEM ID:  [id number]   XR Chest Port 1 View    Narrative    EXAMINATION:  XR CHEST PORT 1 VIEW 4/6/2022 4:27 AM.    COMPARISON: Earlier same day    HISTORY:  ETT placement after advancement    FINDINGS: Frontal view of the chest. Interval advancement of the  endotracheal tube, now projecting over the midthoracic trachea.  Remainder of support devices are stable. Unchanged cardiac silhouette  and pulmonary opacities.      Impression    IMPRESSION:  Endotracheal tube tip now projects over the mid trachea.    I have personally reviewed the examination and initial interpretation  and I agree with the findings.    MIGUEL BARR MD         SYSTEM ID:  [id number]   XR Chest Port 1 View    Narrative    EXAM: XR CHEST PORT 1 VIEW  4/6/2022 1:32 PM    HISTORY: 36 years Male IABP placement.     COMPARISON: Same day chest radiographs.    TECHNIQUE: Portable frontal view of the chest.    FINDINGS:   Endotracheal tube tip is at the mid thoracic trachea. Temperature  probe is at the mid esophagus. Left arm PICC tip is at the low SVC.  Partially visualized enteric tube sidehole projects over the stomach.  IABP is at the descending aorta. Right lower approach ECMO cannula is  at the mid SVC.    Midline trachea. Unchanged cardiomediastinal silhouette. Prominent  pulmonary vasculature. No discernible pneumothorax. Low lung volumes.  Unchanged left basilar/retrocardiac haziness. Unremarkable upper  abdomen. No acute or suspicious osseous abnormalities. Unremarkable  soft tissues.      Impression    IMPRESSION:   1.  IABP is at the descending aorta. Additional support devices as  above.  2.  Persistent retrocardiac/left basilar haziness compatible with  pleural effusion, less likely pulmonary edema or consolidation.    AARON WYATT DO    I have personally reviewed the examination and initial interpretation  and I agree with the findings.    REJI MCCRARY  MD CHRISTEL         SYSTEM ID:  [id number]   Echocardiogram Limited    Narrative    [id number]  [id number]  [id number]  [id number]^PAULINA^DONNA     [facility],[place]  Echocardiography Laboratory  [address] [place] [id number]     Name: TIERNEY CARVALHO  MRN: [id number]  : 1985  Study Date: 2022 03:31 PM  Age: 36 yrs  Gender: Male  Patient [facility]: [facility]  Reason For Study: Cardiac Arrest  Ordering Physician: DONNA GALLARDO  Performed By: Lawanda Odom RDCS     BSA: 2.1 m2  Height: 71 in  Weight: 193 lb  BP: 108/71 mmHg  ______________________________________________________________________________  Procedure  Limited Portable Echo Adult.  ______________________________________________________________________________  Interpretation Summary  Limited TTE for VA-ECMO turndown     At baseline flow (4.3 L/min + IABP), the LVEF is 60-65%. The LVEDD is 4.5 cm.  The inteventricular septum is midline. The AV opens with each beat. The RV  function is normal  At the lowest flow (1.5 L/min without IABP), the LVEF is >70%. The LVEDD is  4.5 cm. The inteventricular septum is midline. The AV opens with each beat.  The RV function is hypercontractile.  This study was compared with the study from 2022. The biventricular  function has normalized.  ______________________________________________________________________________  Atria  Both atria appear normal.     Mitral Valve  Morphologically normal. Not assessed with Doppler.     Tricuspid Valve  Morphologically normal. Not assessed with Doppler.     Pulmonic Valve  The pulmonic valve cannot be assessed.     Vessels  There is a VA-ECMO cannula in the IVC. Unable to assess mean RA pressure given  the patient is on a ventilator.     Pericardium  No pericardial effusion is present.     Compared to Previous Study  This study was compared with the study from 2022 . The biventricular  function has normalized.      ______________________________________________________________________________  Report approved by: Jacki Hernandez 04/06/2022 04:53 PM     ______________________________________________________________________________          Labs:  Recent Labs   Lab 04/06/22  1618 04/06/22  1615 04/06/22  1613 04/06/22  1607 04/06/22  1540 04/06/22  1422 04/06/22  1151 04/06/22  1150 04/06/22  0953   PH 7.34*  --   --   --   --  7.40  --  7.41 7.41   PCO2 32*  --   --   --   --  28*  --  27* 28*   PO2 63*  --   --   --   --  99  --  99 82   HCO3 17*  --   --   --   --  17*  --  17* 18*   O2PER 60  60  60 60 60 60   < > 60  60   < > 60 60  60    < > = values in this interval not displayed.       Lab Results   Component Value Date    HGB 8.3 (L) 04/06/2022    PHGB <30 04/06/2022    PLT 78 (L) 04/06/2022    FIBR 697 (H) 04/06/2022    INR 1.15 04/06/2022    PTT 39 (H) 04/06/2022    DD 3.57 (H) 04/06/2022    ANTCH 54 (L) 04/06/2022         Plan is turn down again tomorrow with possible decannulation.      Oliva Dixon, RT  ECMO Specialist  4/6/2022 5:54 PM

## 2022-04-06 NOTE — PROGRESS NOTES
CRRT STATUS NOTE    DATA:  Time: 1830  Pressures WNL:  YES  Filter Status:  WDL  Problems Reported/Alarms Noted:  none  Supplies Present:  YES    ASSESSMENT:  Patient Net Fluid Balance:  Yesterday, Net +1.6L; Today thus far net 3.8L.  Vital Signs: on nicardipine gtt; vented; HR 90s, BP 100s/60s   Labs:  Stable on 4K Bath: K 4.8, ical 4.2.   Goals of Therapy:  Net -1-2L/day. Bedside RN starting to meet goals of therapy.     INTERVENTIONS:   Restarted today at 1514.    None needed at this time.     PLAN:   Continue CRRT to meet goals of therapy.   Contact CRRT RN [phone] with concerns.

## 2022-04-07 NOTE — PROGRESS NOTES
PALLIATIVE CARE SOCIAL WORK Progress Note   [facility] ([place] [address]    REFERRAL SOURCE: Palliative Care Team; family wishing to transition to comfort care    PCSW received page from bedside RN noting that pt's mother and brother were present and ready to transition patient to comfort care.    PCSW met with mother, Dayana and brother, Lawson at bedside. Dayana clarified that family was willing to give Michael more time though it was her belief that his chances of recovery were minimal due to the amount of time he went without oxygen. PCSW learned that Michael would be the third of four sons Dayana has lost. Dayana very matter of fact at bedside, emphasizing her focus on quality of life; brother Lawson tearful but supportive of his mom.    Mom shared concern that pt has minimal finances and they would need resources for cremation; unit DAT Erin advised and will meet with family to discuss options/resources.    Teams to follow up with family in AM to answer additional questions; possible care conference as discussed with RNCC.    Plan: PCSW will continue to follow while Palliative Care Team involved; anticipate transition to comfort measures in the coming day(s).    Melba Neri [facility]  Palliative Care   Pager [phone]    [facility] Inpatient Team Consult pager [phone] (M-F 8-4:30)  After-hours Answering Service [phone]

## 2022-04-07 NOTE — PROGRESS NOTES
ECMO Shift Summary:    ECMO Equipment:  Console serial number: [id number]  Circuit Lot number: [id number]  Oxygenator Lot number: [id number]    Patient remains on VA ECMO, all equipment is functioning and alarms are appropriately set. RPM's 3600 with flow range 4.1-4.2 L/min. Sweep gas is at 0.5 LPM and FiO2 60%. Circuit remains free of air, clot and fibrin. Cannulas are secure with no bleeding from the site. Extremities are warm to touch and perfused, hands are edematous. Suctioned ETT for a moderate amount of thick pink-tinged, tan secretions.    Significant Shift Events: Fluid removed via CRRT overnight without any ECMO chugging.    Vent settings:  Vent Mode: SIMV/PS  (Synchronized Intermittent Mandatory Ventilation with Pressure Support)  FiO2 (%): 60 %  Resp Rate (Set): 6 breaths/min  Tidal Volume (Set, mL): 400 mL  PEEP (cm H2O): 5 cmH2O  Pressure Support (cm H2O): 5 cmH2O  Resp: 28  .    Heparin is running at 700 u/hr, ACT range 177-186.    The patient is on CRRT, no outward blood was lost. No blood product given overnight.       Intake/Output Summary (Last 24 hours) at 4/7/2022 0556  Last data filed at 4/7/2022 0500  Gross per 24 hour   Intake 5094.08 ml   Output 4743 ml   Net 351.08 ml       ECHO:  No results found for this or any previous visit.  No results found for this or any previous visit.      CXR:  Recent Results (from the past 24 hour(s))   XR Chest Port 1 View    Narrative    EXAM: XR CHEST PORT 1 VIEW  4/6/2022 1:32 PM    HISTORY: 36 years Male IABP placement.     COMPARISON: Same day chest radiographs.    TECHNIQUE: Portable frontal view of the chest.    FINDINGS:   Endotracheal tube tip is at the mid thoracic trachea. Temperature  probe is at the mid esophagus. Left arm PICC tip is at the low SVC.  Partially visualized enteric tube sidehole projects over the stomach.  IABP is at the descending aorta. Right lower approach ECMO cannula is  at the mid SVC.    Midline trachea. Unchanged  cardiomediastinal silhouette. Prominent  pulmonary vasculature. No discernible pneumothorax. Low lung volumes.  Unchanged left basilar/retrocardiac haziness. Unremarkable upper  abdomen. No acute or suspicious osseous abnormalities. Unremarkable  soft tissues.      Impression    IMPRESSION:   1.  IABP is at the descending aorta. Additional support devices as  above.  2.  Persistent retrocardiac/left basilar haziness compatible with  pleural effusion, less likely pulmonary edema or consolidation.    AARON WYATT, DO    I have personally reviewed the examination and initial interpretation  and I agree with the findings.    REJI KEARNEY MD         SYSTEM ID:  [id number]   Echocardiogram Limited    Narrative    [id number]  [id number]  [id number]  [id number]^PAULINA^DONNA     [facility],[place]  Echocardiography Laboratory  [address] [place] [address]     Name: TIERNEY CARVALHO  MRN: [id number]  : 1985  Study Date: 2022 03:31 PM  Age: 36 yrs  Gender: Male  Patient Location: [facility]  Reason For Study: Cardiac Arrest  Ordering Physician: DONNA GALLARDO  Performed By: Lawanda Odom RDCS     BSA: 2.1 m2  Height: 71 in  Weight: 193 lb  BP: 108/71 mmHg  ______________________________________________________________________________  Procedure  Limited Portable Echo Adult.  ______________________________________________________________________________  Interpretation Summary  Limited TTE for VA-ECMO turndown     At baseline flow (4.3 L/min + IABP), the LVEF is 60-65%. The LVEDD is 4.5 cm.  The inteventricular septum is midline. The AV opens with each beat. The RV  function is normal  At the lowest flow (1.5 L/min without IABP), the LVEF is >70%. The LVEDD is  4.5 cm. The inteventricular septum is midline. The AV opens with each beat.  The RV function is hypercontractile.  This study was compared with the study from 2022. The biventricular  function has  normalized.  ______________________________________________________________________________  Atria  Both atria appear normal.     Mitral Valve  Morphologically normal. Not assessed with Doppler.     Tricuspid Valve  Morphologically normal. Not assessed with Doppler.     Pulmonic Valve  The pulmonic valve cannot be assessed.     Vessels  There is a VA-ECMO cannula in the IVC. Unable to assess mean RA pressure given  the patient is on a ventilator.     Pericardium  No pericardial effusion is present.     Compared to Previous Study  This study was compared with the study from 04/04/2022 . The biventricular  function has normalized.     ______________________________________________________________________________  Report approved by: Jacki Hernandez 04/06/2022 04:53 PM     ______________________________________________________________________________          Labs:  Recent Labs   Lab 04/07/22  0356 04/07/22  0355 04/07/22  0142 04/06/22  2359 04/06/22  2358 04/06/22  2204   PH 7.42  --  7.42 7.42  --  7.43   PCO2 27*  --  27* 27*  --  27*   PO2 73*  --  116* 80  --  117*   HCO3 18*  --  18* 18*  --  18*   O2PER 60  60  60 60 60  60 60   < > 60    < > = values in this interval not displayed.       Lab Results   Component Value Date    HGB 8.3 (L) 04/07/2022    PHGB 30 (H) 04/07/2022    PLT 85 (L) 04/07/2022    FIBR >1,200 (H) 04/07/2022    INR 1.14 04/07/2022    PTT 38 04/07/2022    DD 3.28 (H) 04/07/2022    ANTCH 54 (L) 04/06/2022         Plan is to continue to pull fluid via CRRT until ECMO chugging occurs. Repeat turndown likely today. Will continue VA ECMO support at this time and adjust settings as needed.      Anton Simms, RT  ECMO Specialist  4/7/2022 6:28 AM

## 2022-04-07 NOTE — PROGRESS NOTES
CRRT STATUS NOTE    DATA:  Time:  5:51 AM  Pressures WNL:  YES  Filter Status:  WDL    Problems Reported/Alarms Noted:  none    Supplies Present:  YES    ASSESSMENT:  Patient Net Fluid Balance:  4/6 +2393  4/7 0500 -613  Vital Signs:  Vented  RR 22-38 ECMO  IABP  Nicardipine to keep MAP   HR 80's  Labs:  K 4.5  Na 152 (3% infusing)  CO2 19  Ica 4.3 plt 85  Hgb 8.3  Goals of Therapy: net neg 1-2 L /24h, meeting goals of therapy    INTERVENTIONS:   Replaced Ca per bedside RN    PLAN:  Continue per Renal orders.  Call CRRT resource RN [phone] with concerns.

## 2022-04-07 NOTE — PLAN OF CARE
Major Shift Events:  Pt remains critically stable on ECMO, ventilator, IABP, and CRRT. No major events this shift.   Plan: Pull fluid goal of 1-2L net negative via CRRT. Continue to monitor.  For vital signs and complete assessments, please see documentation flowsheets.

## 2022-04-07 NOTE — PROGRESS NOTES
ECMO Attending Progress Note  4/6/2022    Michael Callejas is a 36 year old male who was cannulated for ECMO 4/1/2022 due to refractory PEA cardiac arrest with intermittent VF requiring 4 shocks.    Cannulation Site:  17 Fr in the R femoral artery  25 Fr in the R femoral vein    Interval events: improved pulsatility, brain SvO2 stable during turndowns    Pulsatilty (IABP paused if applicable):50 mmHG     Physical Exam:  Temp:  [97.2  F (36.2  C)-98.8  F (37.1  C)] 98.2  F (36.8  C)  Pulse:  [] 92  Resp:  [23-47] 38  MAP:  [74 mmHg-90 mmHg] 83 mmHg  Arterial Line BP: ()/(56-69) 107/62  FiO2 (%):  [60 %-70 %] 60 %  SpO2:  [94 %-100 %] 95 %    Intake/Output Summary (Last 24 hours) at 4/7/2022 0214  Last data filed at 4/7/2022 0200  Gross per 24 hour   Intake 5074.98 ml   Output 3578 ml   Net 1496.98 ml      Vent Mode: SIMV/PS  (Synchronized Intermittent Mandatory Ventilation with Pressure Support)  FiO2 (%): 60 %  Resp Rate (Set): 6 breaths/min  Tidal Volume (Set, mL): 400 mL  PEEP (cm H2O): 5 cmH2O  Pressure Support (cm H2O): 5 cmH2O  Resp: (!) 38       Labs:  Recent Labs   Lab 04/07/22 0142 04/06/22 2359 04/06/22 2358 04/06/22 2204 04/06/22 2006 04/06/22 2005   PH 7.42 7.42  --  7.43  --  7.39   PCO2 27* 27*  --  27*  --  28*   PO2 116* 80  --  117*  --  93   HCO3 18* 18*  --  18*  --  17*   O2PER 60  60 60 60 60   < > 60    < > = values in this interval not displayed.      Recent Labs   Lab 04/06/22 2202 04/06/22  1631 04/06/22  1151 04/06/22  0954   WBC 16.4* 18.4* 17.7* 17.8*   HGB 8.3* 8.3* 8.1* 8.2*     Creatinine   Date Value Ref Range Status   04/06/2022 4.53 (H) 0.66 - 1.25 mg/dL Final   04/06/2022 4.68 (H) 0.66 - 1.25 mg/dL Final   04/06/2022 4.68 (H) 0.66 - 1.25 mg/dL Final   04/06/2022 4.95 (H) 0.66 - 1.25 mg/dL Final   Blood Flow (Circuit) LPM: 4.14 LPM  Gas Flow  LPM: 0.5 LPM  Gas FiO2   %: 60 %  ACT  (seconds): 177 seconds  Blood Temp  (degrees C): 36.7 C  Pulse Oximetry  (SpO2%):  98 %  Arterial Pressure  mmH mmHg    ECMO Issues including assessments and plan on DOS 2022:  Neuro: Sedated for mechanical ventilation and ECMO.  No acute distress.  NIRS stable b/l  RASS goal: -3  CV: Cardiogenic shock.  Hemodynamically stable on no pressors  Pulm: Keep vent settings at rest settings as above.  FEN/Renal: CRRT  Heme: ACT goal: 180-200, Hemoglobin stable.  Minimal oozing around the ECMO cannulas.  ID: Receiving empiric antibiotics  Cannulae: Position is acceptable on exam and the available imaging.  Distal perfusion cannula is in place and patent.  Extremities are well-perfused.     I have personally reviewed the ECMO flows, oxygenation and CO2 clearance, anticoagulation, and cannula position.  I have also personally assessed the patient's systemic response with hemodynamics, oxygenation, ventilation, and bleeding.       The patient requires continued ECMO support and management in the ICU.  I have discussed patient care and treatment plan with the primary team.      Robert Rodriguez MD, PhD  Interventional/Critical Care Cardiology  [phone]    2022

## 2022-04-07 NOTE — PROGRESS NOTES
"[facility]: [place]  NeuroCritical Care Progress Note    Patient Name:  Michael Callejas  MRN:  [id number]    :  1985  Date of Service:  2022  Primary care provider:  No Ref-Primary, Physician      A/P:   Michael Callejas is a 36 year old male admitted on 2022 with a PMH of drug abuse who presents for witnessed cardiac arrest while doing Meth/DMT this afternoon. Pt was down 5 minutes with bystander CPR prior to ems arrival, where he was found to be in PEA. PEA converted to VF->4 epi->4shocks->intubated in field. Taken to cath lab upon arrival to EMS and cannulated for VA ECMO. Initial CT without acute intracranial pathology. No signs of anoxic injury. However, on repeat imaging, diffuse cerebral edema had developed, though grey-white differentiation was grossly preserved. Hypertonic were thereafter initiated       NEURO RECS  - Continue vEEG   - EEG remains extremely attenuated and near isoelectric state despite being off all sedation, which is a very poor prognostic factor; would highly encourage ongoing discussion with family as to goals of care  - Continue 3% hypertonic saline at 80ml/hr for goal Na > 150; NCC to titrate  - Avoid hypotonic solutions as they may worsen cerebral edema  - Avoid \"nitroprusside\",\"nitroglycerin\" as they may also worsen cerbral edema.  - Advise slow correction of any high Sodiums if needed  - We will continue to follow and monitor their EEG and neurologic exam, please call #[phone] with any questions      Physical Examination:   Pulse 94   Temp 98.2  F (36.8  C)   Resp 22   Ht 1.803 m (5' 11\")   Wt 88.2 kg (194 lb 7.1 oz)   SpO2 100%   BMI 27.12 kg/m    General: Adult male patient, lying in bed, NAD  HEENT: ETT  Cardiac: Tele  Pulm: Vent  Abdomen: Soft, bowel sounds present  Extremities: Warm, no edema  Skin: No rash or lesion     Neuro:    Mental Status: Does not awaken to verbal or noxious stimuli. Does not track when eyes are " manually opened or follow any commands    Cranial Nerves: Pupils are 2mm and briskly reactive. Weak cough reflex. No facial asymmetry noted     Motor: Normal bulk and tone. No abnormal movements noted. Pt fails to move spontaneously or withdraw from noxious stimuli     Sensory: No withdrawal from noxious stimuli in any extremity     Coordination: Unable to assess     Reflexes: Toes mute bilaterally, no clonus     Gait: Unable to assess    24Hr Events:  - EEG remains severely encephalopathic and near isoelectric state. As such, prognosis is expected to be quite poor    Subjective:  Pt is not able to communicate at this time    IMAGING:  I have personally reviewed all labs and imaging for this patient.    CTH (4/4/2022)  Impression:  New cerebral edema with preserved gray-white  differentiation.     EEG (4/3/2022)  EEG through 6 AM today reviewed.  Hypothermia was reversed.  Continues on fentanyl 200 mcg/h and midazolam 8 mg/h throughout the study.  Findings are severely abnormal.    1) electrocerebral activity was present but was severely attenuated, discontinuous, and unreactive.  Findings are consistent with a severe diffuse encephalopathy.  There was minor improvement in amplitude of electrocerebral activity following discontinuation of hypothermia but results continued consistent with severe diffuse encephalopathy.  2) electrographic seizures or periodic discharges were not seen at any point, even during a brief period of recording following discontinuation of hypothermia.      Patient discussed with attending neurologist, Dr. Brandt Raman MD  Neurology PGY2

## 2022-04-07 NOTE — PROGRESS NOTES
[facility]  Cardiac ICU Progress Note  April 1, 2022         Subjective:   Had to replace arterial line overnight. Off sedation. On nicardipine.           H&P:   Mr Michael Callejas is a 36 year old adult male who was admitted on 4/1/2022. Patient was brought by EMS who gave the following report. EMS was called at 12:54 pm to the patient's residence where he had suffered a presumed cardiac arrest after taking methamphetamine and DMT. Per EMS, CPR was administered by room mates. Unclear how long down time prior TO room mates administered CPR or to EMS call. On arrival, EMS noted patient to be in PEA arrest with CPR continued and patient was given total 4 doses of Epi, once of bicarb, one of calcium and 3 doses of Narcan (one ?inhaled, 2 IV). He was also shocked 4 times by EMS for periods of VFib electrical activity enroute to the hospital. Of note, ROSC was never achieved by EMS. On presentation to the Cath lab, patient was in asystole with DENI delivered CPRS continued. Initial blood gas showed a pH of 6.7, O2 of 36. He was cannulated for VA ECMO at 2:08 pm. Post cannulation patient was alternating between PEA and VF with additional 3 total 200 J shocks delivered. Post cannulation coronary angiogram showed clean coronary arteries. Epi, Norepi, Vaso and Phenylephrine was started at high doses and patient was given multiple boluses of Amiodarone. Additionally, an intra-aortic balloon pump and thermogard was placed with patient transported to CT for brain imaging prior to arriving to the ICU for continued management.      Witnessed arest (y/n): yes  Home or public location (y/n): home  Bystander CPR (y/n): yes  Time of 911 call: 12.54 PM  Initial rhythm: PEA  Did they have intermittent ROSC (y/n): no  # of shocks: by EMS: 4,by cath lab 3  Epi:   4  Amps  Amio: none in the field, 2 boluses in the cath lab   Bicarb:  1 amps  EtCO2 en route: unknwon  O2 sat en route:unknown, O2 on initial ABG  "on presentation was 36    Initial rhythm in the cath lab: PEA  First ABG: pH 6.7 CO2: 116  O2: 36  ECMO cannula size: 17 Fr to right femoral artery ? Fr to right femoral vein  Meds given in the cath lab: Epi, NorEpi, Vaso, Phenyl, Amio, 400 meq of sodium Bicarb  Initial PA catheter numbers: N/A           Past Medical History:   No past medical history on file.         Family History:   No family history on file.         Social History:     Social History     Socioeconomic History     Marital status: Single     Spouse name: Not on file     Number of children: Not on file     Years of education: Not on file     Highest education level: Not on file   Occupational History     Not on file   Tobacco Use     Smoking status: Not on file     Smokeless tobacco: Not on file   Substance and Sexual Activity     Alcohol use: Not on file     Drug use: Not on file     Sexual activity: Not on file   Other Topics Concern     Not on file   Social History Narrative     Not on file     Social Determinants of Health     Financial Resource Strain: Not on file   Food Insecurity: Not on file   Transportation Needs: Not on file   Physical Activity: Not on file   Stress: Not on file   Social Connections: Not on file   Intimate Partner Violence: Not on file   Housing Stability: Not on file            Medications:   I have reviewed this patient's current medications  No medications prior to admission.            Review of Systems:   Unable to obtain ROS due to pt's obtunded status            Physical Exam:   Vital signs:  Temp: 98.1  F (36.7  C) Temp src: Esophageal   Pulse: 91   Resp: (!) 32 SpO2: 98 % O2 Device: Mechanical Ventilator   Height: 180.3 cm (5' 11\") Weight: 87.7 kg (193 lb 5.5 oz)  Estimated body mass index is 26.97 kg/m  as calculated from the following:    Height as of this encounter: 1.803 m (5' 11\").    Weight as of this encounter: 87.7 kg (193 lb 5.5 oz).       GENERAL: intubated and sedated  HEENT: ET tube in place  CV: " S1/S2 heard with IABP murmur  RESPIRATORY: distant breath sounds  GI: obese, soft, no bowel sounds on asucultation   EXTREMITIES: 1+ peripheral edema. 2+ bilateral pedal pulses.   NEUROLOGIC: not oriented to place or time, does not follow commands  MUSCULOSKELETAL: No joint swelling or tenderness.   SKIN: No jaundice. No acute rashes or lesions.             Data:   Labs pending    Recent Results (from the past 24 hour(s))   XR Chest Port 1 View    Narrative    EXAMINATION:  XR CHEST PORT 1 VIEW 4/6/2022 12:55 AM.    COMPARISON: 4/5/2022    HISTORY:  Check endotracheal tube placement and ECLS cannula  placement.     FINDINGS: Frontal view. Endotracheal tube tip projects over the high  thoracic trachea near the thoracic inlet, 8 cm above the lizabeth.  Remainder of support devices are unchanged. Unchanged cardiac  silhouette. Right lung remains clear. Continued hazy left lung  opacities.      Impression    IMPRESSION:   Endotracheal tube tip near the thoracic inlet, 8 cm above the lizabeth.    Findings discussed with patient's nurse Vanessa.     I have personally reviewed the examination and initial interpretation  and I agree with the findings.    MIGUEL BARR MD         SYSTEM ID:  [id number]   XR Chest Port 1 View    Narrative    EXAMINATION:  XR CHEST PORT 1 VIEW 4/6/2022 4:27 AM.    COMPARISON: Earlier same day    HISTORY:  ETT placement after advancement    FINDINGS: Frontal view of the chest. Interval advancement of the  endotracheal tube, now projecting over the midthoracic trachea.  Remainder of support devices are stable. Unchanged cardiac silhouette  and pulmonary opacities.      Impression    IMPRESSION:  Endotracheal tube tip now projects over the mid trachea.    I have personally reviewed the examination and initial interpretation  and I agree with the findings.    MIGUEL BARR MD         SYSTEM ID:  [id number]   XR Chest Port 1 View    Narrative    EXAM: XR CHEST PORT 1 VIEW  4/6/2022 1:32  PM    HISTORY: 36 years Male IABP placement.     COMPARISON: Same day chest radiographs.    TECHNIQUE: Portable frontal view of the chest.    FINDINGS:   Endotracheal tube tip is at the mid thoracic trachea. Temperature  probe is at the mid esophagus. Left arm PICC tip is at the low SVC.  Partially visualized enteric tube sidehole projects over the stomach.  IABP is at the descending aorta. Right lower approach ECMO cannula is  at the mid SVC.    Midline trachea. Unchanged cardiomediastinal silhouette. Prominent  pulmonary vasculature. No discernible pneumothorax. Low lung volumes.  Unchanged left basilar/retrocardiac haziness. Unremarkable upper  abdomen. No acute or suspicious osseous abnormalities. Unremarkable  soft tissues.      Impression    IMPRESSION:   1.  IABP is at the descending aorta. Additional support devices as  above.  2.  Persistent retrocardiac/left basilar haziness compatible with  pleural effusion, less likely pulmonary edema or consolidation.    AARON WYATT,     I have personally reviewed the examination and initial interpretation  and I agree with the findings.    REJI KEARNEY MD         SYSTEM ID:  [id number]   Echocardiogram Limited    Narrative    [id number]  [id number]  [id number]  [id number]^PAULINA^DONNA     [facility],[place]  Echocardiography Laboratory  [address] [place], [place] [address]     Name: TIERNEY CARVALHO  MRN: [id number]  : 1985  Study Date: 2022 03:31 PM  Age: 36 yrs  Gender: Male  Patient Location: [facility]  Reason For Study: Cardiac Arrest  Ordering Physician: DONNA GALLARDO  Performed By: Lawanda Odom RDCS     BSA: 2.1 m2  Height: 71 in  Weight: 193 lb  BP: 108/71 mmHg  ______________________________________________________________________________  Procedure  Limited Portable Echo Adult.  ______________________________________________________________________________  Interpretation Summary  Limited TTE for VA-ECMO turndown      At baseline flow (4.3 L/min + IABP), the LVEF is 60-65%. The LVEDD is 4.5 cm.  The inteventricular septum is midline. The AV opens with each beat. The RV  function is normal  At the lowest flow (1.5 L/min without IABP), the LVEF is >70%. The LVEDD is  4.5 cm. The inteventricular septum is midline. The AV opens with each beat.  The RV function is hypercontractile.  This study was compared with the study from 04/04/2022. The biventricular  function has normalized.  ______________________________________________________________________________  Atria  Both atria appear normal.     Mitral Valve  Morphologically normal. Not assessed with Doppler.     Tricuspid Valve  Morphologically normal. Not assessed with Doppler.     Pulmonic Valve  The pulmonic valve cannot be assessed.     Vessels  There is a VA-ECMO cannula in the IVC. Unable to assess mean RA pressure given  the patient is on a ventilator.     Pericardium  No pericardial effusion is present.     Compared to Previous Study  This study was compared with the study from 04/04/2022 . The biventricular  function has normalized.     ______________________________________________________________________________  Report approved by: Jacki Hernandez 04/06/2022 04:53 PM     ______________________________________________________________________________                 Assessment and Plan:   Changes today:  -Plan for ECMO turn down today  -Keep off sedation and keep assessing neurologic status  -Continues to be off pressors  -CRRT per Nephrology will attempt to pull fluid as much as possible    Neurology: # Cerebral Edema  # Concern for Anoxic Brain injury  # Possible Encephalopathy  Intubated, sedated, paralyzed.   Cooled to 34 degrees. CT Head (4/1) unremarkable. Completely warmed on 4/2 night (11pm-midnight).    - Continue to monitor neuro status off sedation  - Not on hypertonic saline for cerebral edema since pt is on CRRT and will not achieve hypernatremia  - High  concern for anoxic brain injury given persistently low O2 sats during field CPR and PAO2 of 36 on arrival. Guarded prognosis in terms of neurologic recovery at this point.  -Neurocrit following, appreciate recs   Cardiovascular / Hemodynamics: # PEA arrest complicated by VF  # Cardiogenic shock with profound vasoplegia and hypotension  # Drug induced cardiac arrest  #ECMO Cannulation (4/1)  #IABP Placement (4/1)  No CAD on coronary angiogram.  Peripheral V-A ECMO inserted for cardiopulmonary support. IABP insert for maximal support.  TTE (4/2): LVEF 5-10% with partially opening AV.   Positive results from turndown on 4/4  --ECMO: 3600RPM, Flow 4LPM, Sweep 1 LPM, FIO2 40%  -Off genevieve/pressors since 4/4  --PO amio with taper (400mg BID for 1 week and then 200mg BID x 1 week then 200mg daily x week then off)  --ACT goal 180-200  --hold ACE/ARB for now given likely reduced renal fxn after arrest  --holding beta blocker given shock      Pulmonary: # Acute Hypoxic Respiratory failure  ETT in place at ~2 cm above the lizabeth.    Vent Mode: (S) SIMV/PS  (Synchronized Intermittent Mandatory Ventilation with Pressure Support) (found on)  FiO2 (%): 60 %  Resp Rate (Set): 6 breaths/min  Tidal Volume (Set, mL): 400 mL  PEEP (cm H2O): 5 cmH2O  Pressure Support (cm H2O): 5 cmH2O  Resp: (!) 32    CXR: Lines in stable position. Remarkably clear. Currently low suspicion for aspiration   --Permissive hypercapnia with CO2 target 40-50  --wean vent as able  --daily CXR  --Q2h ABGs for now  --consider scheduled duonebs if signs of lung dz, currently PRN     GI and Nutrition: # Shock liver secondary to cardiac arrest-Resolving  No known medical hx.   --monitor BID LFTs  --Tube feeds at goal  --bowel regimen - on hold for now due to hypothermia  --GI Prophylaxis: PPI    Renal, Fluid and Electrolytes: #Acute Renal Failure  # Severe lactic acidosis  #Hypernatremia  #Hyperkalemia  Nephrology consulted.   --CRRT initiated (4/3)  --monitor urine  output  --maintain K~3 and Mg>2    Infectious Disease: # Possible aspiration penumonia  No signs of infection. Leukocytosis c/w arrest. Blood cultures collected. Sputum culture (4/1) unremarkable.  --vancomycin/zosyn x7 days for ECMO (4/1-4/7)  --daily blood cultures  --monitor for signs of infection given cooling, lines, and leukocytosis   Hematology and Oncology: # Possible Coagulopathy  Received heparin while in the cath lab  Will continue heparin for ECMO to maintain ACT goal.    --Sent HIT panel and will change heparin to bival  --cryo PRN fibrinogen < 200; FFP for INR >2  --Transfuse for Hgb<10  --heparin gtt for ECMO with ACT goal 180-200   Endocrinology: --HgbA1c 5.3   --On insulin gtt   Lines: L Basilic PICC April 4, 2022  R femoral arterial (17Fr) and venous (25 Fr)ECMO cannulae April 1, 2022  L femoral arterial line (IABP) April 1, 2022  R radial arterial line April 1, 2022  ETT 7.5mm April 1, 2022  Lott catheter April 1, 2022  OG tube April 1, 2022  Restraint: needed    Current lines are required for patient management       Family update by me today: Yes   Mother, Dayana was updated by me. She lives in [place]. Contact number: [phone].   Per mother, patient has an aunt, Teri Jiménez,  who lives in [place], contact number: [phone]    Code Status: Full confirmed with mother on the phone.       The Pt was discussed and evaluated with Dr. Jennifer MD, attending physician, who agrees with the assessment and plan above.     Feliberto Bucio MD  Cardiology Fellow

## 2022-04-07 NOTE — PROGRESS NOTES
Nephrology Progress Note  04/07/2022     Michael Callejas is a 36 year old male with PMH of colon cancer dx'd at age 16 ( per mother no longer resectable), substance use d/o, admitted following out of hospital PEA arrest 2/2 Meth overdose which progressed to VT/Cardiogenic shock requiring VA Ecmo/IABP c/b probable anoxic brain injury and HALEIGH. Baseline creat unknown.       Assessment & Recommendations:     1. HALEIGH - Remains dialysis dependent, anuric.     - Etiology of his HALEIGH is ATN from cardiogenic shock   - Consent obtained from mother by primary team, I discussed with her per phone and she confirmed consent for renal replacement therapy. I answered her questions to the best of my ability   - Continue UF goal of 1-2 kg/net/day   - Continue to monitor for renal recovery with daily chemistry labs/ strict I/O and daily weights   - CRRT access is through the ecmo circuit     2. Volume status - Mild hypervolemia. He is intubated. Weight 88.2 kg.  Admission weight 84.2 kg. Intake 4999 ml/Output: UO 0 ml, UF 2606 for  net fluid gain of 2.3 liters. On Nicardipine   - Continue UF goal 0-100 ml/net/hr    - Continue daily weights/strict I/O     3. Cardiogenic shock - On VA ecmo/IABP. MAPs > 65 ,< 100 on Nicardipine 5 mg/hr ( cerebral edema )     - Per CV     4. Electrolytes - No acute concerns. K 4.5, Na 152 ( goal 150)   - Running on a K 4 bath     5. Cerebral edema - Receiving 3% NS to maintain Na ~ 150. Currently running at 80 ml/hr     6. Colon Ca? - Apparently there are multiple conflicting stories from family members but no concrete information. No way to access care everywhere. No masses seen on CT.       7 Antimicrobials - Vanco/Zosyn. WBC 17.7. Vanco level up to 31.0 on 4/6/22    Recommendations were communicated to primary team via progress note    Seen and discussed with Dr. Jarad Salinas, NP   Division of Renal Disease and Hypertension  [facility]  [employer] Console    Interval History :  "  Nursing and provider notes from last 24 hours reviewed.  Tolerating CRRT and fluid removal  Remains on 3% NS at 80 ml/hr to maintain serum Na level ~ 150    Review of Systems:   I reviewed the following systems:  Unable to obtain given sedation/intubation    Physical Exam:   I/O last 3 completed shifts:  In: 5185.28 [I.V.:3440.28; NG/GT:665]  Out: 4994 [Other:4994]   Pulse 93   Temp 98.1  F (36.7  C)   Resp (!) 31   Ht 1.803 m (5' 11\")   Wt 88.2 kg (194 lb 7.1 oz)   SpO2 100%   BMI 27.12 kg/m       GENERAL APPEARANCE: sedated  Pulmonary: lungs diminished. On Vent   CV: On VA Ecmo/IABP   - Edema - minimal  GI: soft, nondistended.   MS: unable to assess  SKIN: no rash, warm, dry, no cyanosis  NEURO: sedated  ACCESS: Ecmo circuit    Labs:   All labs reviewed by me  Electrolytes/Renal - Recent Labs   Lab Test 04/07/22  0955 04/07/22  0954 04/07/22  0750 04/07/22  0749 04/07/22  0541 04/07/22  0355 04/06/22  2204 04/06/22  2201 04/06/22  1747 04/06/22  1619 04/06/22  1421 04/06/22  1151   *  --  152*  --  152* 152*   < > 151*  151*   < > 152*   < > 152*   POTASSIUM 4.4  --  4.4  --  4.5 4.5   < > 4.8  4.8   < > 4.8   < > 4.8   CHLORIDE 123*  --  122*  --  123* 123*   < > 123*  123*   < > 123*   < > 123*   CO2  --   --  18*  --  19* 19*   < > 17*  17*   < > 20   < > 16*   BUN  --   --  88*  --  91* 96*   < > 101*  101*   < > 114*   < > 108*   CR  --   --  3.83*  --  4.09* 4.15*   < > 4.68*  4.68*   < > 5.28*   < > 5.23*   * 125* 134*   < > 121* 135*   < > 122*  122*   < > 152*   < > 148*   SAMIR  --   --  8.4*  --  7.9* 7.8*   < > 7.9*  7.9*   < > 7.2*   < > 7.4*   MAG  --   --   --   --   --  2.5*  --  2.6*  --  2.7*  --  2.7*   PHOS  --   --   --   --   --  5.9*  --  5.3*  --   --   --  4.9*    < > = values in this interval not displayed.       CBC -   Recent Labs   Lab Test 04/07/22  0955 04/07/22  0355 04/06/22  2202   WBC 17.1* 17.7* 16.4*   HGB 8.1* 8.3* 8.3*   PLT 91* 85* 79*       LFTs - "   Recent Labs   Lab Test 04/07/22  0355 04/06/22  2201 04/06/22  1619   ALKPHOS 148 152* 143   BILITOTAL 0.6 0.6 0.6   * 492* 500*   * 444* 490*   PROTTOTAL 5.8* 5.8* 5.5*   ALBUMIN 2.1* 2.1*  2.1* 1.9*       Iron Panel - No lab results found.      Imaging:  EXAMINATION:  XR CHEST PORT 1 VIEW 4/7/2022 1:15 AM.     COMPARISON: 4/6/2022     HISTORY:  Check endotracheal tube placement and ECLS cannula  placement. DO NOT log-roll patient.  Place film under patient using  patient safety handling process.     FINDINGS: Frontal view. Unchanged endotracheal tube, temperature  probe, gastric tube, ECMO cannula. IABP marker at the level of the  lizabeth unchanged from prior exam. Minimally hazy left basilar  opacities unchanged. Right lung is clear.                                                                      IMPRESSION: Stable support devices and pulmonary opacities.     I have personally reviewed the examination and initial interpretation  and I agree with the findings.     MIGUEL BARR MD          Current Medications:    [START ON 4/10/2022] amiodarone  200 mg Oral or Feeding Tube BID     [START ON 4/17/2022] amiodarone  200 mg Oral or Feeding Tube Daily     amiodarone  400 mg Oral or Feeding Tube BID     B and C vitamin Complex with folic acid  5 mL Per Feeding Tube Daily     chlorhexidine  15 mL Swish & Spit BID     folic acid  1 mg Per Feeding Tube Daily     pantoprazole  40 mg Oral or Feeding Tube QAM AC     piperacillin-tazobactam  3.375 g Intravenous Q6H     protein modular  2 packet Per Feeding Tube 4x Daily     thiamine  100 mg Per Feeding Tube Daily     vancomycin  1,500 mg Intravenous Q24H       dextrose Stopped (04/04/22 0400)     CRRT replacement solution 12.5 mL/kg/hr (04/07/22 0926)     heparin (PRESSURE BAG) 2 unit/mL in 0.9% NaCl 6 Units/hr (04/07/22 1000)     HEParin 700 Units/hr (04/07/22 1000)     insulin regular Stopped (04/02/22 1010)     niCARdipine (CARDENE) infusion  ADULT/PEDS GREATER than or EQUAL to 45 kg max conc 5 mg/hr (04/07/22 1000)     - MEDICATION INSTRUCTIONS -       CRRT replacement solution 200 mL/hr at 04/06/22 1444     CRRT replacement solution 12.5 mL/kg/hr (04/07/22 0926)     sodium chloride 3% 80 mL/hr at 04/07/22 0552     Kesha Salinas, ALTAGRACIA     I, Taisha Abraham, saw and evaluated this patient as part of a shared visit.  I have reviewed and discussed with the advanced practice provider their history, physical and plan.    I personally reviewed the vital signs, medications, labs and imaging.    My key history or physical exam findings:  HALEIGH, cardiogenic shock/ VT, on ECMO, anoxic brain injury  Key management decisions made by me:  Continue CRRT for volume and solute control in setting of anuric HALEIGH  - neuro status still unclear     Taisha Abraham  Date of Service (when I saw the patient): 4/7

## 2022-04-07 NOTE — PROGRESS NOTES
[facility]  Cardiac ICU Progress Note  April 1, 2022         Subjective:   Reviewed events from last 24 hours.  On CRRT. Remains off sedation. On Nicardipine.           H&P:   Mr Michael Callejas is a 36 year old adult male who was admitted on 4/1/2022. Patient was brought by EMS who gave the following report. EMS was called at 12:54 pm to the patient's residence where he had suffered a presumed cardiac arrest after taking methamphetamine and DMT. Per EMS, CPR was administered by room mates. Unclear how long down time prior TO room mates administered CPR or to EMS call. On arrival, EMS noted patient to be in PEA arrest with CPR continued and patient was given total 4 doses of Epi, once of bicarb, one of calcium and 3 doses of Narcan (one ?inhaled, 2 IV). He was also shocked 4 times by EMS for periods of VFib electrical activity enroute to the hospital. Of note, ROSC was never achieved by EMS. On presentation to the Cath lab, patient was in asystole with DENI delivered CPRS continued. Initial blood gas showed a pH of 6.7, O2 of 36. He was cannulated for VA ECMO at 2:08 pm. Post cannulation patient was alternating between PEA and VF with additional 3 total 200 J shocks delivered. Post cannulation coronary angiogram showed clean coronary arteries. Epi, Norepi, Vaso and Phenylephrine was started at high doses and patient was given multiple boluses of Amiodarone. Additionally, an intra-aortic balloon pump and thermogard was placed with patient transported to CT for brain imaging prior to arriving to the ICU for continued management.      Witnessed arest (y/n): yes  Home or public location (y/n): home  Bystander CPR (y/n): yes  Time of 911 call: 12.54 PM  Initial rhythm: PEA  Did they have intermittent ROSC (y/n): no  # of shocks: by EMS: 4,by cath lab 3  Epi:   4  Amps  Amio: none in the field, 2 boluses in the cath lab   Bicarb:  1 amps  EtCO2 en route: unknwon  O2 sat en route:unknown, O2  "on initial ABG on presentation was 36    Initial rhythm in the cath lab: PEA  First ABG: pH 6.7 CO2: 116  O2: 36  ECMO cannula size: 17 Fr to right femoral artery ? Fr to right femoral vein  Meds given in the cath lab: Epi, NorEpi, Vaso, Phenyl, Amio, 400 meq of sodium Bicarb  Initial PA catheter numbers: N/A           Past Medical History:   No past medical history on file.         Family History:   No family history on file.         Social History:     Social History     Socioeconomic History     Marital status: Single     Spouse name: Not on file     Number of children: Not on file     Years of education: Not on file     Highest education level: Not on file   Occupational History     Not on file   Tobacco Use     Smoking status: Not on file     Smokeless tobacco: Not on file   Substance and Sexual Activity     Alcohol use: Not on file     Drug use: Not on file     Sexual activity: Not on file   Other Topics Concern     Not on file   Social History Narrative     Not on file     Social Determinants of Health     Financial Resource Strain: Not on file   Food Insecurity: Not on file   Transportation Needs: Not on file   Physical Activity: Not on file   Stress: Not on file   Social Connections: Not on file   Intimate Partner Violence: Not on file   Housing Stability: Not on file            Medications:   I have reviewed this patient's current medications  No medications prior to admission.            Review of Systems:   Unable to obtain ROS due to pt's obtunded status            Physical Exam:   Vital signs:  Temp: 98.1  F (36.7  C) Temp src: Esophageal   Pulse: 89   Resp: 29 SpO2: 99 % O2 Device: Mechanical Ventilator   Height: 180.3 cm (5' 11\") Weight: 88.2 kg (194 lb 7.1 oz)  Estimated body mass index is 27.12 kg/m  as calculated from the following:    Height as of this encounter: 1.803 m (5' 11\").    Weight as of this encounter: 88.2 kg (194 lb 7.1 oz).       GENERAL: intubated and sedated  HEENT: ET tube in " place  CV: S1/S2 heard with IABP murmur  RESPIRATORY: distant breath sounds  GI: obese, soft, no bowel sounds on asucultation   EXTREMITIES: 1+ peripheral edema. 2+ bilateral pedal pulses.   NEUROLOGIC: not oriented to place or time, does not follow commands  MUSCULOSKELETAL: No joint swelling or tenderness.   SKIN: No jaundice. No acute rashes or lesions.             Data:   Labs pending    Recent Results (from the past 24 hour(s))   XR Chest Port 1 View    Narrative    EXAM: XR CHEST PORT 1 VIEW  2022 1:32 PM    HISTORY: 36 years Male IABP placement.     COMPARISON: Same day chest radiographs.    TECHNIQUE: Portable frontal view of the chest.    FINDINGS:   Endotracheal tube tip is at the mid thoracic trachea. Temperature  probe is at the mid esophagus. Left arm PICC tip is at the low SVC.  Partially visualized enteric tube sidehole projects over the stomach.  IABP is at the descending aorta. Right lower approach ECMO cannula is  at the mid SVC.    Midline trachea. Unchanged cardiomediastinal silhouette. Prominent  pulmonary vasculature. No discernible pneumothorax. Low lung volumes.  Unchanged left basilar/retrocardiac haziness. Unremarkable upper  abdomen. No acute or suspicious osseous abnormalities. Unremarkable  soft tissues.      Impression    IMPRESSION:   1.  IABP is at the descending aorta. Additional support devices as  above.  2.  Persistent retrocardiac/left basilar haziness compatible with  pleural effusion, less likely pulmonary edema or consolidation.    AARON WYATT DO    I have personally reviewed the examination and initial interpretation  and I agree with the findings.    REJI KEARNEY MD         SYSTEM ID:  [id number]   Echocardiogram Limited    Narrative    [id number]  [id number]  [id number]  [id number]^PAULINA^DONNA     [facility],[place]  Echocardiography Laboratory  [address] [place] [id number]     Name: TIERNEY CARVALHO  MRN: [id number]  :  1985  Study Date: 04/06/2022 03:31 PM  Age: 36 yrs  Gender: Male  Patient Location: Replaced by [facility]  Reason For Study: Cardiac Arrest  Ordering Physician: DONNA GALLARDO  Performed By: Lawanda Odom RDCS     BSA: 2.1 m2  Height: 71 in  Weight: 193 lb  BP: 108/71 mmHg  ______________________________________________________________________________  Procedure  Limited Portable Echo Adult.  ______________________________________________________________________________  Interpretation Summary  Limited TTE for VA-ECMO turndown     At baseline flow (4.3 L/min + IABP), the LVEF is 60-65%. The LVEDD is 4.5 cm.  The inteventricular septum is midline. The AV opens with each beat. The RV  function is normal  At the lowest flow (1.5 L/min without IABP), the LVEF is >70%. The LVEDD is  4.5 cm. The inteventricular septum is midline. The AV opens with each beat.  The RV function is hypercontractile.  This study was compared with the study from 04/04/2022. The biventricular  function has normalized.  ______________________________________________________________________________  Atria  Both atria appear normal.     Mitral Valve  Morphologically normal. Not assessed with Doppler.     Tricuspid Valve  Morphologically normal. Not assessed with Doppler.     Pulmonic Valve  The pulmonic valve cannot be assessed.     Vessels  There is a VA-ECMO cannula in the IVC. Unable to assess mean RA pressure given  the patient is on a ventilator.     Pericardium  No pericardial effusion is present.     Compared to Previous Study  This study was compared with the study from 04/04/2022 . The biventricular  function has normalized.     ______________________________________________________________________________  Report approved by: Jacki Hernandez 04/06/2022 04:53 PM     ______________________________________________________________________________      XR Chest Port 1 View    Narrative    EXAMINATION:  XR CHEST PORT 1 VIEW 4/7/2022 1:15  AM.    COMPARISON: 4/6/2022    HISTORY:  Check endotracheal tube placement and ECLS cannula  placement. DO NOT log-roll patient.  Place film under patient using  patient safety handling process.    FINDINGS: Frontal view. Unchanged endotracheal tube, temperature  probe, gastric tube, ECMO cannula. IABP marker at the level of the  lizabeth unchanged from prior exam. Minimally hazy left basilar  opacities unchanged. Right lung is clear.      Impression    IMPRESSION: Stable support devices and pulmonary opacities.    I have personally reviewed the examination and initial interpretation  and I agree with the findings.    MIGUEL BARR MD         SYSTEM ID:  [id number]              Assessment and Plan:   Changes today:  -Plan for ECMO turn down today and decannulation in cath lab tomorrow  -Follow sputum cultures (4/5) growing staph. Continue Vanc/Zosyn today. Will decide on further antibiotics tomorow  -Keep off sedation and keep assessing neurologic status  -Continues to be off pressors  -CRRT per Nephrology will attempt to pull fluid as much as possible    Neurology: # Cerebral Edema  # Concern for Anoxic Brain injury  # Possible Encephalopathy  Intubated, sedated, paralyzed.   Cooled to 34 degrees. CT Head (4/1) unremarkable. Completely warmed on 4/2 night (11pm-midnight).    - Continue to monitor neuro status off sedation  - Hypertonic saline per NeuroCrit  - High concern for anoxic brain injury given persistently low O2 sats during field CPR and PAO2 of 36 on arrival. Guarded prognosis in terms of neurologic recovery at this point.  -Neurocrit following, appreciate recs   Cardiovascular / Hemodynamics: # PEA arrest complicated by VF  # Cardiogenic shock with profound vasoplegia and hypotension  # Drug induced cardiac arrest  #ECMO Cannulation (4/1)  #IABP Placement (4/1)  No CAD on coronary angiogram.  Peripheral V-A ECMO inserted for cardiopulmonary support. IABP insert for maximal support.  TTE (4/2): LVEF  5-10% with partially opening AV.   --ECMO: 3600RPM, Flow 4LPM, Sweep 0.5 LPM, FIO2 60%  -Off genevieve/pressors since 4/4  --PO amio with taper (400mg BID for 1 week and then 200mg BID x 1 week then 200mg daily x week then off)  --ACT goal 180-200  --hold ACE/ARB for now given likely reduced renal fxn after arrest  --holding beta blocker given shock   -Turndown study today and plan for ECMO decannulation in cath lab tomorrow     Pulmonary: # Acute Hypoxic Respiratory failure  ETT in place at ~2 cm above the lizabeth.    Vent Mode: SIMV/PS  (Synchronized Intermittent Mandatory Ventilation with Pressure Support)  FiO2 (%): 60 %  Resp Rate (Set): 6 breaths/min  Tidal Volume (Set, mL): 400 mL  PEEP (cm H2O): 5 cmH2O  Pressure Support (cm H2O): 5 cmH2O  Resp: 29    CXR: Lines in stable position. Remarkably clear. Currently low suspicion for aspiration   --Permissive hypercapnia with CO2 target 40-50  --wean vent as able  --daily CXR  --Q2h ABGs for now  --consider scheduled duonebs if signs of lung dz, currently PRN     GI and Nutrition: # Shock liver secondary to cardiac arrest-Resolving  No known medical hx.   --monitor BID LFTs  --Tube feeds at goal  --bowel regimen - on hold for now due to hypothermia  --GI Prophylaxis: PPI    Renal, Fluid and Electrolytes: #Acute Renal Failure  # Severe lactic acidosis  #Hypernatremia  #Hyperkalemia  Nephrology consulted.   --CRRT initiated (4/3)  --monitor urine output  --maintain K~3 and Mg>2    Infectious Disease: # Possible aspiration penumonia  Leukocytosis c/w arrest. Blood cultures collected. Sputum culture (4/1) growing staph aureus.  --vancomycin/zosyn x7 days for ECMO (4/1-4/7) Patient will call when needed decide on further antibiotics based on further sputum cultures and clinical source  --daily blood cultures  --monitor for signs of infection given cooling, lines, and leukocytosis   Hematology and Oncology: # Possible Coagulopathy  Received heparin while in the cath lab  HIT  screen negative (4/5). Will continue heparin for ECMO to maintain ACT goal.    --cryo PRN fibrinogen < 200; FFP for INR >2  --Transfuse for Hgb<10  --heparin gtt for ECMO with ACT goal 180-200   Endocrinology: --HgbA1c 5.3   --On insulin gtt   Lines: L Basilic PICC April 4, 2022  R femoral arterial (17Fr) and venous (25 Fr)ECMO cannulae April 1, 2022  L femoral arterial line (IABP) April 1, 2022  L radial arterial line April 6, 2022  ETT 7.5mm April 1, 2022  Lott catheter April 1, 2022  OG tube April 1, 2022  Restraint: needed    Current lines are required for patient management       Family update by me today: Yes   Mother, Dayana was updated by me. She lives in [place]. Contact number: [phone].   Per mother, patient has an aunt, Teri iJménez,  who lives in [place], contact number: [phone]    Code Status: Full confirmed with mother on the phone.       The Pt was discussed and evaluated with Dr. Jennifer MD, attending physician, who agrees with the assessment and plan above.     Feliberto Bucio MD  Cardiology Fellow

## 2022-04-07 NOTE — PHARMACY-VANCOMYCIN DOSING SERVICE
Pharmacy Vancomycin Note  Date of Service 2022  Patient's  1985   36 year old, male    Indication: Aspiration Pneumonia  Day of Therapy: 7  Current vancomycin regimen: intermittent dosing  Current vancomycin monitoring method: Trough (Method 2 = manual dose calculation)  Current vancomycin therapeutic monitoring goal: 15-20 mg/L    Current estimated CrCl = Estimated Creatinine Clearance: 31.7 mL/min (A) (based on SCr of 4.09 mg/dL (H)).    Creatinine for last 3 days  2022: 10:02 AM Creatinine 3.44 mg/dL; 12:09 PM Creatinine 3.41 mg/dL;  4:02 PM Creatinine 3.32 mg/dL;  9:34 PM Creatinine 3.25 mg/dL;  9:34 PM Creatinine 3.25 mg/dL  2022:  3:40 AM Creatinine 3.00 mg/dL;  9:51 AM Creatinine 2.89 mg/dL; 11:51 AM Creatinine 2.90 mg/dL;  4:04 PM Creatinine 3.37 mg/dL;  6:07 PM Creatinine 3.61 mg/dL;  8:03 PM Creatinine 3.79 mg/dL; 10:12 PM Creatinine 4.01 mg/dL; 10:12 PM Creatinine 4.01 mg/dL; 11:51 PM Creatinine 4.26 mg/dL  2022:  2:02 AM Creatinine 4.50 mg/dL;  3:40 AM Creatinine 4.26 mg/dL;  6:09 AM Creatinine 4.76 mg/dL;  7:56 AM Creatinine 4.92 mg/dL;  9:54 AM Creatinine 4.90 mg/dL; 11:51 AM Creatinine 5.23 mg/dL;  2:22 PM Creatinine 5.45 mg/dL;  4:19 PM Creatinine 5.28 mg/dL;  5:49 PM Creatinine 5.14 mg/dL;  8:08 PM Creatinine 4.95 mg/dL; 10:01 PM Creatinine 4.68 mg/dL; 10:01 PM Creatinine 4.68 mg/dL; 11:58 PM Creatinine 4.53 mg/dL  2022:  1:42 AM Creatinine 4.33 mg/dL;  3:55 AM Creatinine 4.15 mg/dL;  5:41 AM Creatinine 4.09 mg/dL    Recent Vancomycin Levels (past 3 days)  2022:  9:54 AM Vancomycin 31.0 mg/L  2022:  3:55 AM Vancomycin 18.8 mg/L    Vancomycin IV Administrations (past 72 hours)                   vancomycin 1500 mg in 0.9% NaCl 250 ml intermittent infusion 1,500 mg (mg) 1,500 mg New Bag 22 1138     1,500 mg New Bag 22 1234                Nephrotoxins and other renal medications (From now, onward)    Start     Dose/Rate Route Frequency Ordered Stop  "   04/07/22 1200  piperacillin-tazobactam (ZOSYN) 3.375 g vial to attach to  mL bag        Note to Pharmacy: For [facility], [facility] and [facility]: For Zosyn-naive patients, use the \"Zosyn initial dose + extended infusion\" order panel.    3.375 g  over 30 Minutes Intravenous EVERY 6 HOURS 04/07/22 0725 04/08/22 2359    04/07/22 0800  vancomycin 1500 mg in 0.9% NaCl 250 ml intermittent infusion 1,500 mg         1,500 mg  over 90 Minutes Intravenous EVERY 24 HOURS 04/07/22 0730 04/09/22 0759    04/05/22 2048  vancomycin place marin - receiving intermittent dosing         1 each Does not apply SEE ADMIN INSTRUCTIONS 04/05/22 2048 04/08/22 2047 04/01/22 1730  norepinephrine (LEVOPHED) 16 mg in  mL infusion MAX CONC CENTRAL LINE         0.01-0.6 mcg/kg/min × 88 kg (Dosing Weight)  0.8-49.5 mL/hr  Intravenous CONTINUOUS 04/01/22 1709               Contrast Orders - past 72 hours (72h ago, onward)            None          Interpretation of levels and current regimen:  Vancomycin level is reflective of therapeutic level    Has serum creatinine changed greater than 50% in last 72 hours: No    Urine output:  anuric    Renal Function: ARF on Dialysis    Plan:  1. Resume vancomycin 1500 mg every 24 hours now that patient has been restarted on CRRT.  2. Vancomycin monitoring method: Renal Replacement Therapy  3. Vancomycin therapeutic monitoring goal: 15-20 mg/L  4. Pharmacy will check vancomycin levels as appropriate in 1-3 Days.  5. Serum creatinine levels will be ordered daily for the first week of therapy and at least twice weekly for subsequent weeks.    Esme Sr, PharmD  "

## 2022-04-07 NOTE — PLAN OF CARE
Major Shift Events:  vEEG resumed (following 24 hr hygiene break). ECMO Turndown done, 3.8L to 1L with IABP in standby, tolerated well.     Labs/Protocols: Q 2 hour VBG and lactic via R internal jugular line. Q 2 hour BMP (d/t 3% NaCl). Hypernatremic, goal Na >150 (per neurocrit).   Neuro: Unresponsive-off all sedation 4/3. Pupillometer checks Q 4 hours-see flowsheets. vEEG in place. NeuroCrit Following. Afebrile.   Cardiac: SR, HR 80s-90s. MAP goal >65, <100, on nicardipine gtt throughout shift. VA ECMO: 60%, sweep 0.5, flow 3.8-4L, speed 3560. Femoral IABP, 1:1, 100%.   Respiratory: SIMV 60%, 400, RR 6, PS 5, PEEP 5. Pt overbreathing vent throughout shift, RR 20s-30s. Moderate amount of oral and ETT secretions.   GI/: OG with TF at goal. Rectal tube in place with large amount of loose brown stool this shift. External catheter in place, no urine this shift. CRRT running via ECMO circuit, goal net negative 1 to 2L / 24 hours, meeting goal.   Skin: Midsternal chest abrasion.   LDAs: R internal jugular venous line. L radial A line. L PICC. R femoral ECMO cannulas. L femoral IABP. R and L PIVs.   GTTs: 3% NaCl, Nicardipine, Heparin (via ECMO circuit)  Diet: TF at 45cc/hr with 30cc water Q 4 hours.   Activity: Bedrest. Repositioned Q 2 hours as tolerated for skin integrity.   Teaching: Patient's mom and brother updated about patient status and plan of care at bedside. Patient's dad and S.O. updated via phone.     Plan: Family care conference tomorrow. Palliative Following. Possible ECMO decannulation in cath lab tomorrow 4/8.   For vital signs and complete assessments, please see documentation flowsheets.

## 2022-04-08 NOTE — CONSULTS
Social Work  Consult received for family /resources. Reviewed chart and discussed pt's case with bedside RN and palliative care providers. Pt's parents have made the decision to transition pt to comfort cares. [facility] has been contacted and is working with pt's family. Pt's mother has been provided with local lodging information. Spoke with bedside RN who reported [facility] is to meet with family soon, provided [facility] rep with low cost cremation information to provide to pt's family, per their request.     SW will continue to follow peripherally for resources and support.     JUANJO De La Vega, [facility]  ICU   [facility]  Phone: [phone]  Pager: [phone]

## 2022-04-08 NOTE — PROGRESS NOTES
ECMO Shift Summary:[id number]      ECMO Equipment:  Console serial number: [id number]  Circuit Lot number: [id number]  Oxygenator Lot number: [id number]    Patient remains on VA ECMO, all equipment is functioning and alarms are appropriately set. RPM's 3560 with flow range 3.7 L/min. Sweep gas is at 0.5 LPM and FiO2 60%. Circuit remains free of air, clot and fibrin. Cannulas are secure with no bleeding from site. Extremities are warm. Suctioned ETT for moderate amounts of tan secretions.    Significant Shift Events: [facility] here evaluating patient for organ donation. Heparin off for active lower GI bleed, ACT range changed to 140-160    Vent settings:  Vent Mode: SIMV/PS  (Synchronized Intermittent Mandatory Ventilation with Pressure Support)  FiO2 (%): 60 %  Resp Rate (Set): 6 breaths/min  Tidal Volume (Set, mL): 500 mL  PEEP (cm H2O): 5 cmH2O  Pressure Support (cm H2O): 5 cmH2O  Resp: 18  .    Heparin is off. ACT range 160-170's.    Urine output is per RN charting with CRT, blood loss active GI bleed. Product given included 1 unit of PRBC.       Intake/Output Summary (Last 24 hours) at 4/8/2022 1815  Last data filed at 4/8/2022 1800  Gross per 24 hour   Intake 5226.77 ml   Output 5117 ml   Net 109.77 ml       ECHO:  No results found for this or any previous visit.  No results found for this or any previous visit.      CXR:  Recent Results (from the past 24 hour(s))   XR Chest Port 1 View    Narrative    EXAMINATION:  XR CHEST PORT 1 VIEW 4/8/2022 1:15 AM.    COMPARISON: 4/7/2022    HISTORY:  Check endotracheal tube placement and ECLS cannula  placement. DO NOT log-roll patient.  Place film under patient using  patient safety handling process.    FINDINGS: Frontal view. Unchanged support devices. Increased layering  left pleural effusion. Unchanged retrocardiac opacities. Right lung  remains clear.      Impression    IMPRESSION: Unchanged support devices and left lung retrocardiac  opacities. Increased left  layering pleural effusion.    I have personally reviewed the examination and initial interpretation  and I agree with the findings.    GUADALUPE HASSAN MD         SYSTEM ID:  [id number]   US Abdominal Aorta Imaging    Narrative    ULTRASOUND ABDOMINAL AORTA 4/8/2022 4:44 PM    CLINICAL HISTORY: Per [facility] for organ donation.     COMPARISONS: CT chest, abdomen, and pelvis 4/1/2022.    REFERRING PROVIDER: DONNA GALLARDO    TECHNIQUE: Aorta and iliac arteries evaluated with grayscale imaging.  Cine clip saved in the patient's record.    FINDINGS:   Aorta:       Proximal: 2.2 cm       Mid: 2.6 cm       Distal: 1.9 cm    Right common iliac artery: 1.2 cm    Left common iliac artery: 1.3 cm.    Aortic balloon pump in place.      Impression    IMPRESSION:  1. Ectatic mid aorta measures 2.6 cm. Aortic balloon pump in place.    Aorta diameter measurement classification:  < 2.5 cm: Normal  2.5 - 2.9 cm: Ectatic  > 3 cm: Aneurysmal    Iliac artery:  Males: > or = 1.7 cm: Ectatic  Females: > or = 1.5 cm: Ectatic  > 2.5 cm: Aneurysmal    PEG FISHER MD         SYSTEM ID:  [id number]       Labs:  Recent Labs   Lab 04/08/22  1733 04/08/22  1604 04/08/22  1332 04/08/22  1215   PH 7.45 7.42 7.44 7.43   PCO2 30* 32* 30* 31*   PO2 139* 127* 121* 122*   HCO3 21 21 20* 20*   O2PER 60 60  60  60 60 60       Lab Results   Component Value Date    HGB 6.9 (LL) 04/08/2022    PHGB 30 (H) 04/08/2022     (L) 04/08/2022    FIBR 648 (H) 04/08/2022    INR 1.19 (H) 04/08/2022    PTT 58 (H) 04/08/2022    DD 7.29 (H) 04/08/2022    ANTCH 77 (L) 04/08/2022         Plan is CT at 2000 and continue with parameters set by [facility].      Oliva Dixon, RT  ECMO Specialist  4/8/2022 6:15 PM

## 2022-04-08 NOTE — PROGRESS NOTES
[facility]  Cardiac ICU Progress Note  April 1, 2022         Subjective:   Remains intubated. Not on sedation or pressors. Family planning on withdrawing today.           H&P:   Mr Michael Callejas is a 36 year old adult male who was admitted on 4/1/2022. Patient was brought by EMS who gave the following report. EMS was called at 12:54 pm to the patient's residence where he had suffered a presumed cardiac arrest after taking methamphetamine and DMT. Per EMS, CPR was administered by room mates. Unclear how long down time prior TO room mates administered CPR or to EMS call. On arrival, EMS noted patient to be in PEA arrest with CPR continued and patient was given total 4 doses of Epi, once of bicarb, one of calcium and 3 doses of Narcan (one ?inhaled, 2 IV). He was also shocked 4 times by EMS for periods of VFib electrical activity enroute to the hospital. Of note, ROSC was never achieved by EMS. On presentation to the Cath lab, patient was in asystole with DENI delivered CPRS continued. Initial blood gas showed a pH of 6.7, O2 of 36. He was cannulated for VA ECMO at 2:08 pm. Post cannulation patient was alternating between PEA and VF with additional 3 total 200 J shocks delivered. Post cannulation coronary angiogram showed clean coronary arteries. Epi, Norepi, Vaso and Phenylephrine was started at high doses and patient was given multiple boluses of Amiodarone. Additionally, an intra-aortic balloon pump and thermogard was placed with patient transported to CT for brain imaging prior to arriving to the ICU for continued management.      Witnessed arest (y/n): yes  Home or public location (y/n): home  Bystander CPR (y/n): yes  Time of 911 call: 12.54 PM  Initial rhythm: PEA  Did they have intermittent ROSC (y/n): no  # of shocks: by EMS: 4,by cath lab 3  Epi:   4  Amps  Amio: none in the field, 2 boluses in the cath lab   Bicarb:  1 amps  EtCO2 en route: unknwon  O2 sat en route:unknown,  "O2 on initial ABG on presentation was 36    Initial rhythm in the cath lab: PEA  First ABG: pH 6.7 CO2: 116  O2: 36  ECMO cannula size: 17 Fr to right femoral artery ? Fr to right femoral vein  Meds given in the cath lab: Epi, NorEpi, Vaso, Phenyl, Amio, 400 meq of sodium Bicarb  Initial PA catheter numbers: N/A           Past Medical History:   No past medical history on file.         Family History:   No family history on file.         Social History:     Social History     Socioeconomic History     Marital status: Single     Spouse name: Not on file     Number of children: Not on file     Years of education: Not on file     Highest education level: Not on file   Occupational History     Not on file   Tobacco Use     Smoking status: Not on file     Smokeless tobacco: Not on file   Substance and Sexual Activity     Alcohol use: Not on file     Drug use: Not on file     Sexual activity: Not on file   Other Topics Concern     Not on file   Social History Narrative     Not on file     Social Determinants of Health     Financial Resource Strain: Not on file   Food Insecurity: Not on file   Transportation Needs: Not on file   Physical Activity: Not on file   Stress: Not on file   Social Connections: Not on file   Intimate Partner Violence: Not on file   Housing Stability: Not on file            Medications:   I have reviewed this patient's current medications  No medications prior to admission.            Review of Systems:   Unable to obtain ROS due to pt's obtunded status            Physical Exam:   Vital signs:  Temp: 98.6  F (37  C) Temp src: Esophageal   Pulse: 90   Resp: 26 SpO2: 100 % O2 Device: Mechanical Ventilator   Height: 180.3 cm (5' 11\") Weight: 85.3 kg (188 lb 0.8 oz)  Estimated body mass index is 26.23 kg/m  as calculated from the following:    Height as of this encounter: 1.803 m (5' 11\").    Weight as of this encounter: 85.3 kg (188 lb 0.8 oz).       GENERAL: intubated and sedated  HEENT: ET tube in " place  CV: S1/S2 heard with IABP murmur  RESPIRATORY: distant breath sounds  GI: obese, soft, no bowel sounds on asucultation   EXTREMITIES: 1+ peripheral edema. 2+ bilateral pedal pulses.   NEUROLOGIC: not oriented to place or time, does not follow commands  MUSCULOSKELETAL: No joint swelling or tenderness.   SKIN: No jaundice. No acute rashes or lesions.             Data:   Labs pending    Recent Results (from the past 24 hour(s))   Echo Limited    Narrative    [id number]  [id number]  [id number]  [id number]^PAULINA^DONNA     [facility],[place]  Echocardiography Laboratory  [address] [place] [address]     Name: TIERNEY CARVALHO  MRN: [id number]  : 1985  Study Date: 2022 04:05 PM  Age: 36 yrs  Gender: Male  Patient Location: [facility]  Reason For Study: Cardiac Arrest  Ordering Physician: DONNA GALLARDO  Performed By: Merna Gaming     BSA: 2.1 m2  Height: 71 in  Weight: 194 lb  ______________________________________________________________________________  Procedure  Limited Portable Echo Adult.  ______________________________________________________________________________  Interpretation Summary  A left pleural effusion is present.  This is a limited TTE for VA ECMO turnd down     Baseline: 3.8 LPM  LVEF is 55-60%  LVIDd is 4.1cm and the septum is midline  The aortic valve opens with each cardiac cycle  The right ventricular function is normal.     Minimal flow: 1.0 LPM with the IABP off  LVEF is >705%  LVIDd is 3.5 cm and the septum is midline  The aortic valve opens with each cardiac cycle  The right ventricular function is normal to hyperdynamic.     Compared to prior imaging on 2022 there is no signficnat difference in rv  or lv function but there is concern for possible ra thrombus (echodensity).  ______________________________________________________________________________  Atria  There is a mobile echodensity in the right atrium concerning for thrombus.  It  measures 1.8X1.3cm and is appears attached to either the superior atrial  septum or superior wall of the atrium but can not rule out attachement to the  cannual.     Mitral Valve  The mitral valve is normal.     Aortic Valve  Aortic valve is normal in structure and function.     Tricuspid Valve  The tricuspid valve is normal. Not assessed with Doppler. The peak velocity of  the tricuspid regurgitant jet is not obtainable. Pulmonary artery systolic  pressure cannot be assessed.     Pulmonic Valve  The valve leaflets are not well visualized. Not assessed with Doppler.     Vessels  There is an ecmo cannula in the ivc.     Pericardium  No pericardial effusion is present.     Miscellaneous  This is a limited TTE for VA ECMO turnd down     Baseline: 3.8 LPM  LVEF is 50-55%  LVIDd is 4.1cm and the septum is midline  The aortic valve opens with each cardiac cycle  The right ventricular function is normal.     Minimal flow: 1.0 LPM with the IABP off  LVEF is 60-65%  LVIDd is 3.5 cm and the septum is midline  The aortic valve opens with each cardiac cycle  The right ventricular function is normal to hyperdynamic. A left pleural  effusion is present.     Compared to Previous Study  Compared to prior imaging on 4/6/2022 there is no signficnat difference in rv  or lv function but there is concern for possible ra thrombus (echodensity).  ______________________________________________________________________________  MMode/2D Measurements & Calculations  LVIDd: 4.1 cm     ______________________________________________________________________________  Report approved by: Jacki Ladd 04/07/2022 05:48 PM         XR Chest Port 1 View    Narrative    EXAMINATION:  XR CHEST PORT 1 VIEW 4/8/2022 1:15 AM.    COMPARISON: 4/7/2022    HISTORY:  Check endotracheal tube placement and ECLS cannula  placement. DO NOT log-roll patient.  Place film under patient using  patient safety handling process.    FINDINGS: Frontal view.  Unchanged support devices. Increased layering  left pleural effusion. Unchanged retrocardiac opacities. Right lung  remains clear.      Impression    IMPRESSION: Unchanged support devices and left lung retrocardiac  opacities. Increased left layering pleural effusion.    I have personally reviewed the examination and initial interpretation  and I agree with the findings.    GUADALUPE HASSAN MD         SYSTEM ID:  [id number]              Assessment and Plan:   Changes today:  -Initial plan for decannulation today but family opting for withdrawal of care after discussion with Neuro crit care  -[employer] contacted  -Follow sputum cultures (4/5) growing staph. Continue Vanc/Zosyn today.   -Keep off sedation and keep assessing neurologic status  -Continues to be off pressors  -CRRT per Nephrology will attempt to pull fluid as much as possible    Neurology: # Cerebral Edema  # Concern for Anoxic Brain injury  # Possible Encephalopathy  Intubated, sedated, paralyzed.   Cooled to 34 degrees. CT Head (4/1) unremarkable. Completely warmed on 4/2 night (11pm-midnight).    - Continue to monitor neuro status off sedation  - Hypertonic saline per NeuroCrit  - High concern for anoxic brain injury given persistently low O2 sats during field CPR and PAO2 of 36 on arrival. Guarded prognosis in terms of neurologic recovery at this point.  -Neurocrit following, appreciate recs   Cardiovascular / Hemodynamics: # PEA arrest complicated by VF  # Cardiogenic shock with profound vasoplegia and hypotension  # Drug induced cardiac arrest  #ECMO Cannulation (4/1)  #IABP Placement (4/1)  No CAD on coronary angiogram.  Peripheral V-A ECMO inserted for cardiopulmonary support. IABP insert for maximal support.  TTE (4/2): LVEF 5-10% with partially opening AV.   --ECMO: 3600RPM, Flow 4LPM, Sweep 0.5 LPM, FIO2 60%  -Off genevieve/pressors since 4/4  --PO amio with taper (400mg BID for 1 week and then 200mg BID x 1 week then 200mg daily x week then  off)  --ACT goal 180-200  --hold ACE/ARB for now given likely reduced renal fxn after arrest  --holding beta blocker given shock   -Turndown study today and plan for ECMO decannulation in cath lab tomorrow     Pulmonary: # Acute Hypoxic Respiratory failure  ETT in place at ~2 cm above the lizabeth.    Vent Mode: SIMV/PS  (Synchronized Intermittent Mandatory Ventilation with Pressure Support)  FiO2 (%): 60 %  Resp Rate (Set): 6 breaths/min  Tidal Volume (Set, mL): 500 mL  PEEP (cm H2O): 5 cmH2O  Pressure Support (cm H2O): 5 cmH2O  Resp: 26    CXR: Lines in stable position. Remarkably clear. Currently low suspicion for aspiration   --Permissive hypercapnia with CO2 target 40-50  --wean vent as able  --daily CXR  --Q2h ABGs for now  --consider scheduled duonebs if signs of lung dz, currently PRN     GI and Nutrition: # Shock liver secondary to cardiac arrest-Resolving  No known medical hx.   --monitor BID LFTs  --Tube feeds at goal  --bowel regimen - on hold for now due to hypothermia  --GI Prophylaxis: PPI    Renal, Fluid and Electrolytes: #Acute Renal Failure  # Severe lactic acidosis  #Hypernatremia  #Hyperkalemia  Nephrology consulted.   --CRRT initiated (4/3)  --monitor urine output  --maintain K~3 and Mg>2    Infectious Disease: # Possible aspiration penumonia  Leukocytosis c/w arrest. Blood cultures collected. Sputum culture (4/1) growing staph aureus.  --vancomycin/zosyn x7 days for ECMO (4/1-4/7) Patient will call when needed decide on further antibiotics based on further sputum cultures and clinical source  --daily blood cultures  --monitor for signs of infection given cooling, lines, and leukocytosis   Hematology and Oncology: # Possible Coagulopathy  Received heparin while in the cath lab  HIT screen negative (4/5). Will continue heparin for ECMO to maintain ACT goal.    --cryo PRN fibrinogen < 200; FFP for INR >2  --Transfuse for Hgb<10  --heparin gtt for ECMO with ACT goal 180-200   Endocrinology: --HgbA1c  5.3   --On insulin gtt   Lines: L Basilic PICC April 4, 2022  R femoral arterial (17Fr) and venous (25 Fr)ECMO cannulae April 1, 2022  L femoral arterial line (IABP) April 1, 2022  L radial arterial line April 6, 2022  ETT 7.5mm April 1, 2022  Lott catheter April 1, 2022  OG tube April 1, 2022  Restraint: needed    Current lines are required for patient management       Family update by me today: Yes   Mother, Dayana was updated by me. She lives in [place]. Contact number: [phone].   Per mother, patient has an aunt, Teri Jiménez,  who lives in [place], contact number: [phone]    Code Status: Full confirmed with mother on the phone.       The Pt was discussed and evaluated with Dr. Jennifer MD, attending physician, who agrees with the assessment and plan above.     Feliberto Bucio MD  Cardiology Fellow

## 2022-04-08 NOTE — PROGRESS NOTES
ECMO Shift Summary:      ECMO Equipment:  Console serial number: [id number]  Circuit Lot number: [id number]  Oxygenator Lot number: [id number]       Patient remains on VA ECMO, all equipment is functioning and alarms are appropriately set. RPM's 2222-6743 with flow range 3.8-4.16 L/min. Sweep gas is at 0.5 LPM and FiO2 60%. Circuit remains free of air, clot and fibrin. Cannulas are secure with no bleeding from site. Extremities are warm.    Significant Shift Events: Bedside turndown with ECHO done. Plan to decann tomorrow in heart cath and family conference.    Vent settings:  Vent Mode: SIMV/PS  (Synchronized Intermittent Mandatory Ventilation with Pressure Support)  FiO2 (%): 60 %  Resp Rate (Set): 6 breaths/min  Tidal Volume (Set, mL): 500 mL  PEEP (cm H2O): 5 cmH2O  Pressure Support (cm H2O): 5 cmH2O  Resp: 29  .    Heparin is running at 700 unit(s)/hr, ACT [id number].    Urine output is as charted, blood loss was none. Product given included none.       Intake/Output Summary (Last 24 hours) at 4/7/2022 2052  Last data filed at 4/7/2022 2000  Gross per 24 hour   Intake 5477.17 ml   Output 80850 ml   Net -4753.83 ml       ECHO:  No results found for this or any previous visit.  No results found for this or any previous visit.      CXR:  Recent Results (from the past 24 hour(s))   XR Chest Port 1 View    Narrative    EXAMINATION:  XR CHEST PORT 1 VIEW 4/7/2022 1:15 AM.    COMPARISON: 4/6/2022    HISTORY:  Check endotracheal tube placement and ECLS cannula  placement. DO NOT log-roll patient.  Place film under patient using  patient safety handling process.    FINDINGS: Frontal view. Unchanged endotracheal tube, temperature  probe, gastric tube, ECMO cannula. IABP marker at the level of the  lizabeth unchanged from prior exam. Minimally hazy left basilar  opacities unchanged. Right lung is clear.      Impression    IMPRESSION: Stable support devices and pulmonary opacities.    I have personally reviewed the  examination and initial interpretation  and I agree with the findings.    MIGUEL BARR MD         SYSTEM ID:  [id number]   Echo Limited    Narrative    [id number]  [id number]  [id number]  [id number]^PAULINA^DONNA     [facility],[place]  Echocardiography Laboratory  [address] [place] [id number]     Name: TIERNEY CARVALHO  MRN: [id number]  : 1985  Study Date: 2022 04:05 PM  Age: 36 yrs  Gender: Male  Patient Location: [facility]  Reason For Study: Cardiac Arrest  Ordering Physician: DONNA GALLARDO  Performed By: Merna Gaming     BSA: 2.1 m2  Height: 71 in  Weight: 194 lb  ______________________________________________________________________________  Procedure  Limited Portable Echo Adult.  ______________________________________________________________________________  Interpretation Summary  A left pleural effusion is present.  This is a limited TTE for VA ECMO turnd down     Baseline: 3.8 LPM  LVEF is 55-60%  LVIDd is 4.1cm and the septum is midline  The aortic valve opens with each cardiac cycle  The right ventricular function is normal.     Minimal flow: 1.0 LPM with the IABP off  LVEF is >705%  LVIDd is 3.5 cm and the septum is midline  The aortic valve opens with each cardiac cycle  The right ventricular function is normal to hyperdynamic.     Compared to prior imaging on 2022 there is no signficnat difference in rv  or lv function but there is concern for possible ra thrombus (echodensity).  ______________________________________________________________________________  Atria  There is a mobile echodensity in the right atrium concerning for thrombus. It  measures 1.8X1.3cm and is appears attached to either the superior atrial  septum or superior wall of the atrium but can not rule out attachement to the  cannual.     Mitral Valve  The mitral valve is normal.     Aortic Valve  Aortic valve is normal in structure and function.     Tricuspid Valve  The tricuspid valve is  normal. Not assessed with Doppler. The peak velocity of  the tricuspid regurgitant jet is not obtainable. Pulmonary artery systolic  pressure cannot be assessed.     Pulmonic Valve  The valve leaflets are not well visualized. Not assessed with Doppler.     Vessels  There is an ecmo cannula in the ivc.     Pericardium  No pericardial effusion is present.     Miscellaneous  This is a limited TTE for VA ECMO turnd down     Baseline: 3.8 LPM  LVEF is 50-55%  LVIDd is 4.1cm and the septum is midline  The aortic valve opens with each cardiac cycle  The right ventricular function is normal.     Minimal flow: 1.0 LPM with the IABP off  LVEF is 60-65%  LVIDd is 3.5 cm and the septum is midline  The aortic valve opens with each cardiac cycle  The right ventricular function is normal to hyperdynamic. A left pleural  effusion is present.     Compared to Previous Study  Compared to prior imaging on 4/6/2022 there is no signficnat difference in rv  or lv function but there is concern for possible ra thrombus (echodensity).  ______________________________________________________________________________  MMode/2D Measurements & Calculations  LVIDd: 4.1 cm     ______________________________________________________________________________  Report approved by: Jacki Ladd 04/07/2022 05:48 PM             Labs:  Recent Labs   Lab 04/07/22 1947 04/07/22  1946 04/07/22  1754 04/07/22  1623 04/07/22  1558 04/07/22  1354   PH  --  7.45 7.43  --  7.44 7.43   PCO2  --  27* 29*  --  28* 29*   PO2  --  83 117*  --  86 107*   HCO3  --  19* 19*  --  19* 19*   O2PER 60 60 60  60 60 60  60  60  60 60       Lab Results   Component Value Date    HGB 7.8 (L) 04/07/2022    PHGB 30 (H) 04/07/2022    PLT 91 (L) 04/07/2022    FIBR 707 (H) 04/07/2022    INR 1.12 04/07/2022    PTT 37 04/07/2022    DD 3.50 (H) 04/07/2022    ANTCH 65 (L) 04/07/2022         Plan is to continue to support and possible decann tomorrow.      Funmilayo Abernathy,  RT  ECMO Specialist  4/7/2022 8:52 PM

## 2022-04-08 NOTE — PROGRESS NOTES
ECMO Attending Progress Note  2022    Michael Callejas is a 36 year old male who was cannulated for ECMO 2022 due to refractory PEA cardiac arrest with intermittent VF requiring 4 shocks.    Cannulation Site:  17 Fr in the R femoral artery  25 Fr in the R femoral vein    Interval events: improved pulsatility, brain SvO2 stable during turndowns    Pulsatilty (IABP paused if applicable):50 mmHG     Physical Exam:  Temp:  [97.9  F (36.6  C)-98.6  F (37  C)] 98.6  F (37  C)  Pulse:  [] 91  Resp:  [19-38] 29  MAP:  [79 mmHg-94 mmHg] 93 mmHg  Arterial Line BP: (105-122)/(56-71) 119/70  FiO2 (%):  [60 %] 60 %  SpO2:  [95 %-100 %] 100 %    Intake/Output Summary (Last 24 hours) at 2022  Last data filed at 2022  Gross per 24 hour   Intake 5326.67 ml   Output 9756 ml   Net -4429.33 ml      Vent Mode: SIMV/PS  (Synchronized Intermittent Mandatory Ventilation with Pressure Support)  FiO2 (%): 60 %  Resp Rate (Set): 6 breaths/min  Tidal Volume (Set, mL): 500 mL  PEEP (cm H2O): 5 cmH2O  Pressure Support (cm H2O): 5 cmH2O  Resp: 29       Labs:  Recent Labs   Lab 22  1947 22  1946 22  1754 22  1623 22  1558 22  1354   PH  --  7.45 7.43  --  7.44 7.43   PCO2  --  27* 29*  --  28* 29*   PO2  --  83 117*  --  86 107*   HCO3  --  19* 19*  --  19* 19*   O2PER 60 60 60  60 60 60  60  60  60 60      Recent Labs   Lab 22  1559 22  1208 22  0955 22  0355   WBC 16.6* 15.9* 17.1* 17.7*   HGB 7.8* 7.9* 8.1* 8.3*     Creatinine   Date Value Ref Range Status   2022 3.34 (H) 0.66 - 1.25 mg/dL Final   2022 3.41 (H) 0.66 - 1.25 mg/dL Final   2022 3.53 (H) 0.66 - 1.25 mg/dL Final   2022 3.52 (H) 0.66 - 1.25 mg/dL Final   Blood Flow (Circuit) LPM: 3.8 LPM  Gas Flow  LPM: 0.5 LPM  Gas FiO2   %: 60 %  ACT  (seconds): 182 seconds  Blood Temp  (degrees C): 36.9 C  Pulse Oximetry  (SpO2%): 100 %  Arterial Pressure  mmH mmHg    ECMO  Issues including assessments and plan on DOS 4/7/2022:  Neuro: Sedated for mechanical ventilation and ECMO.  No acute distress.  NIRS stable b/l  RASS goal: -3  CV: Cardiogenic shock.  Hemodynamically stable on no pressors  Pulm: Keep vent settings at rest settings as above.  FEN/Renal: CRRT  Heme: ACT goal: 180-200, Hemoglobin stable.  Minimal oozing around the ECMO cannulas.  ID: Receiving empiric antibiotics  Cannulae: Position is acceptable on exam and the available imaging.  Distal perfusion cannula is in place and patent.  Extremities are well-perfused.     I have personally reviewed the ECMO flows, oxygenation and CO2 clearance, anticoagulation, and cannula position.  I have also personally assessed the patient's systemic response with hemodynamics, oxygenation, ventilation, and bleeding.       The patient requires continued ECMO support and management in the ICU.  I have discussed patient care and treatment plan with the primary team.      Robert Rodriguez MD, PhD  Interventional/Critical Care Cardiology  [phone]    April 7, 2022

## 2022-04-08 NOTE — PROGRESS NOTES
[facility]  Palliative Care Daily Progress Note       Recommendations & Counseling       Plan to transition to CMO per mother and brother    Requesting resources for assistance with cremation, appreciate unit SW    Appreciate PC SW support of patient and family          Assessments          Michael Callejas is a 36 year old male who presented via EMS with cardiac arrest after taking methamphetamine and DMT that has been complicated by cardiogenic shock on VA ECMO, anoxic brain injury, respiratory failure, transaminitis, shock liver, HALEIGH, and aspiration PNA.      Today, the patient was seen for:  Cardiogenic shock  Concern for anoxic brain injury  Shock liver  HALEIGH  Aspiration PNA    Prognosis, Goals, or Advance Care Planning was addressed today with: Yes.    Met with patient, mother, brother, along with neurology, cardiology, bedside nurse, PCSW, and myself to discuss goals of care. Discussed his current medical condition being that his heart is currently functioning well, his liver is still somewhat impaired along with his kidneys, and discussed the concern for significant brain injury leading to possibly a persistent vegetative state. Family discussed that he would not want to live like this.     Mood, coping, and/or meaning in the context of serious illness were addressed today: Yes.  Summary/Comments: family seem to be coping appropriately at this time. Offered support.              Interval History:     Chart review/discussion with unit or clinical team members:   Notes reviewed, no acute events. Remain on VA ECMO, CRRT, IABP, ventilator. Heart function improving. Neurologically concerning signs however unable to prognosticate well due to lack of MRI.    Per patient or family/caregivers today:  Patient is intubated and sedated, family at bedside and supportive    Key Palliative Symptoms:  We are not helping to manage these symptoms currently in this patient.                Review of Systems:     Besides above, an additional ROS unable to be completed as patient intubated and sedated and unable to participate in interview.           Medications:     I have reviewed this patient's medication profile and medications during this hospitalization.           Physical Exam:   Vitals were reviewed  Temp: 98.6  F (37  C) Temp src: Esophageal   Pulse: 96   Resp: 23 SpO2: 100 % O2 Device: Mechanical Ventilator      Intake/Output Summary (Last 24 hours) at 4/8/2022 0916  Last data filed at 4/8/2022 0900  Gross per 24 hour   Intake 5414.54 ml   Output 7510 ml   Net -2095.46 ml     Constitutional: intubated, sedated, no apparent distress  ENT: Normocephalic, without obvious abnormality, atramatic.  Lungs: No increased work of breathing, good air exchange on ventilator  Cardiovascular: VA ECMO  Musculoskeletal: No redness, warmth, or swelling of the joints.   Neurologic: sedated   Skin: No rashes, erythema           Data Reviewed:     Reviewed recent pertinent imaging, comments:   IMPRESSION: Unchanged support devices and left lung retrocardiac   opacities. Increased left layering pleural effusion.     Reviewed recent labs, comments:   Sodium 151  Potassium 4.5  Creatinine 3.06  GFR 26  Albumin 2.0  WBC 17.9  Hemoglobin 7.5  Platelets 103    VELIA Morris CNS  Palliative Care Consult Team  Pager: [phone]    50 minutes spent. This includes 35 minutes face to face with patient and family discussing current complex health conditions and prognosis, goals of care, end of life cares. Coordination of care with the primary team, palliative SW, neurology and RN regarding goals of care, psychosocial needs.

## 2022-04-08 NOTE — PROGRESS NOTES
"[facility]: [place]  NeuroCritical Care Progress Note    Patient Name:  Michael Callejas  MRN:  [id number]    :  1985  Date of Service:  2022  Primary care provider:  No Ref-Primary, Physician      A/P:   Michael Callejas is a 36 year old male admitted on 2022 with a PMH of drug abuse who presents for witnessed cardiac arrest while doing Meth/DMT this afternoon. Pt was down 5 minutes with bystander CPR prior to ems arrival, where he was found to be in PEA. PEA converted to VF->4 epi->4shocks->intubated in field. Taken to cath lab upon arrival to EMS and cannulated for VA ECMO. Initial CT without acute intracranial pathology. No signs of anoxic injury. However, on repeat imaging, diffuse cerebral edema had developed, though grey-white differentiation was grossly preserved. Hypertonics were thereafter initiated. Unfortunately, further monitoring with EEG showed very limited brain activity, raising concerns for a poor prognosis going forward. A care conference was performed on 2022 with the family electing to proceed with comfort cares       NEURO RECS  - STOP vEEG  - STOP 3% hypertonic saline  - No further neurologic interventions or investigations are needed at this time  - Given that the pt has been changed to comfort cares, Neurology will sign off please call #[phone] with any questions      Physical Examination:   Pulse 91   Temp 98.4  F (36.9  C)   Resp 24   Ht 1.803 m (5' 11\")   Wt 85.3 kg (188 lb 0.8 oz)   SpO2 100%   BMI 26.23 kg/m    Patient was not examined given transition to comfort care    24Hr Events:  - Care Conference between family, primary team, and neurology was completed today. After discussing the pt's course and concern that he would likely have a very poor prognosis going forward, the family elected to proceed with a transition to comfort care measures    Subjective:  Pt is not able to communicate at this time    IMAGING:  I have " personally reviewed all labs and imaging for this patient.    CTH (4/4/2022)  Impression:  New cerebral edema with preserved gray-white  differentiation.     EEG (4/3/2022)  EEG through 6 AM today reviewed.  Hypothermia was reversed.  Continues on fentanyl 200 mcg/h and midazolam 8 mg/h throughout the study.  Findings are severely abnormal.    1) electrocerebral activity was present but was severely attenuated, discontinuous, and unreactive.  Findings are consistent with a severe diffuse encephalopathy.  There was minor improvement in amplitude of electrocerebral activity following discontinuation of hypothermia but results continued consistent with severe diffuse encephalopathy.  2) electrographic seizures or periodic discharges were not seen at any point, even during a brief period of recording following discontinuation of hypothermia.      Patient discussed with attending neurologist, Dr. Brandt Raman MD  Neurology PGY2

## 2022-04-08 NOTE — PROGRESS NOTES
CRRT STATUS NOTE    DATA:  Time:  5:03 PM  Pressures WNL:  YES  Filter Status:  CRRT Filter Status:WDL    Problems Reported/Alarms Noted: none    Supplies Present:  Yes    ASSESSMENT:  Patient Net Fluid Balance:  Net negative 256  Vital Signs:  T98.1 HR88 RR20 /60 MAP83  Labs:  K 4.2, I Ca4.2  Goals of Therapy:  goal net neg 1-2L/24h    INTERVENTIONS:   Discussed goals of care with bedside RN    PLAN:  Anticipate comfort cares.

## 2022-04-08 NOTE — PROGRESS NOTES
ECMO [place] STUDY    Inotropes/Pressors:  None  On nicardipine 5mg/hr    Some parts may not be complete since I had to temporarily attend to another critical pt while the turndown was happening.    Vent Mode: SIMV/PS  (Synchronized Intermittent Mandatory Ventilation with Pressure Support)  FiO2 (%): 60 %  Resp Rate (Set): 6 breaths/min  Tidal Volume (Set, mL): 500 mL  PEEP (cm H2O): 5 cmH2O  Pressure Support (cm H2O): 5 cmH2O  Resp: 29       Flow  BP HR O2 Sat  MVO2  IABP   4.2 90/63 100 --  81.5  1:1  3.5 102/61 101 --  82.5  1:1  3.0 106/71 103 --  79.7  1:1  2.5 104/65 104 96  79.4  1:1  2.0 98/65 107 94  78.4  1:1  1.5 97/65 106 94  78.3  1:1  1.5 109/65 105 94  79  Off      Feliberto Bucio MD  Cardiology Fellow    April 7, 2022

## 2022-04-08 NOTE — PROGRESS NOTES
CRRT STATUS NOTE    DATA:  Time: 1900  Pressures WNL:  YES  Filter Status:  WDL  Problems Reported/Alarms Noted:  none  Supplies Present:  YES    ASSESSMENT:  Patient Net Fluid Balance:  Yesterday, Net +2.4L; Today thus far net -3.5L.   Vital Signs: on no pressors. Vented. HR 91. /70. (93)   Labs:  Stable on 4k bath; k 4.2, ical 4.1 - replaced.   Goals of Therapy:  Net- 1-2L/24hrs. Exceeding goals of therapy due to stool output and Cards verbal order to pull extra fluid.      INTERVENTIONS:    None needed at this time. Discussed with bedside RN decreasing fluid removal rate or having cards change order.     PLAN:   Continue CRRT to meet goals of therapy.   Contact CRRT RN [phone] with concerns.

## 2022-04-08 NOTE — PROGRESS NOTES
"SPIRITUAL HEALTH SERVICES  SPIRITUAL ASSESSMENT Progress Note  [facility] ([place] 4E    REFERRAL SOURCE: RN/Palliative     Pt unresponsive.  Family at bedside, including pt's brother, mother, and significant other.  All grieving, but appropriate.     provided emotional/spiritual support as they navigated making decisions about pt's care.      Pt's mother said this is the third son she has lost, and it has made her \"wise but calloused.\"  Pt's mother (Dayana) described being close to her son, and knew when he wouldn't come home for a long time that he was in trouble.  Pt's S/O has three children with pt, and they have been on and off for 8 years. Pt's brother has not seen patient in 8 years, and lives in [place].      Per family, pt \"would rather care for others than be cared for,\" and would \"not want to live like a vegetable.\"  Pt's mother described the prognosis for other family, and said that she and pt's brother had made decision to transition to comfort care.  She said she feels worried she is making the wrong decision but ultimately knows that it is the right one.      Pt does not belong to a particular chelsey tradition, per family, but does believe in God.  Pt's family asked for prayer, which was provided by  (prayer for comfort, peace and guidance/wisdom as family makes decisions).       provided supportive presence, exploration of grief/coping and reflective listening as family gathered around bedside.    PLAN: SHS will remain available for ongoing support as needed.     Maira Nickerson    Pager: [phone]    "

## 2022-04-08 NOTE — PROGRESS NOTES
PALLIATIVE CARE SOCIAL WORK Progress Note   [facility] ([place]) Unit 4E    REFERRAL SOURCE: Palliative Care Team; decision for transition to comfort measures    Palliative Care Team met this morning along with Neuro, Cardiology with pt's mother and brother to review status and goals of care.    Mother Dayana remains clear that she does not believe Michael will recover to a point in which he would find an acceptable quality of life. Teams verbalized understanding of mother's wishes on Michael' behalf and plan for transition to comfort cares initiated.    Memory making of hand print and heartbeats in a bottle completed and provided to Dayana.     Plan: Palliative Care Team remains available should additional needs arise while patient on comfort care measures.    Melba Neri [facility]  Palliative Care   Pager [phone]    [facility] Inpatient Team Consult pager [phone] (M-F 8-4:30)  After-hours Answering Service [phone]

## 2022-04-08 NOTE — PROGRESS NOTES
[facility]  WOC Nurse Inpatient Adult Pressure Injury Prevention Assessment: ECMO  Follow up    4/8: Pt is now comfort cares, no longer appropriate for assessment. WOC will leave prevention POC in place and sign off.     Positioning Tolerance: Fair  Date of ECMO cannulation: 4/1/22  Presence of Ischemia: No  Location of ischemia: n/a    Pressure Injury Prevention Interventions In Place:  Optifoam Dressing under ECMO Cannula, Z flow Positioner under head, Pillows for repositioning, TAPs Wedge Positioners in use, Heel off-loading boots, Pillows under calves for heel suspension and Mepilex Sacral Dressing   Current support surface: Standard  Low air loss mattress       Pressure Injury Prevention Interventions Added:  None        Plan of Care for Positioning and Pressure Injury Prevention  Reposition patient every 1-2 hours using TAP Wedges  Position head on Z flow positioner, mold indentation at areas of pressure points.  Pad ECMO IJ cannula with Optifoam (#[id number]) along face and scalp between skin and cannula and under Coban head wraps    Pad ECMO groin and chest cannula under rigid connectors with Optifoam or Soft cloth  Heel off-loading Boots at all times  Sacral Mepilex for Prevention, change every 5 days and prn  Low Air loss mattress    Patient History:   According to medical record: Angie Caruso is a 36 year old adult male who was admitted on 4/1/2022. Patient was brought by EMS who gave the following report. EMS was called at 12:54 pm to the patient's residence where he had suffered a presumed cardiac arrest after taking methamphetamine and DMT. Per EMS, CPR was administered by room mates. Unclear how long down time prior TO room mates administered CPR or to EMS call. On arrival, EMS noted patient to be in PEA arrest with CPR continued and patient was given total 4 doses of Epi, once of bicarb, one of calcium and 3 doses of Narcan (one ?inhaled, 2 IV).  He was also shocked 4 times by EMS for periods of VFib electrical activity enroute to the hospital. Of note, ROSC was never achieved by EMS. On presentation to the Cath lab, patient was in asystole with DENI delivered CPRS continued. Initial blood gas showed a pH of 6.7, O2 of 36. He was cannulated for VA ECMO at 2:08 pm. Post cannulation patient was alternating between PEA and VF with additional 3 total 200 J shocks delivered. Post cannulation coronary angiogram showed clean coronary arteries. Epi, Norepi, Vaso and Phenylephrine was started at high doses and patient was given multiple boluses of Amiodarone. Additionally, an intra-aortic balloon pump and thermogard was placed with patient transported to CT for brain imaging prior to arriving to the ICU for continued management.    Current Diet / Nutrition:     Orders Placed This Encounter      NPO for Medical/Clinical Reasons Except for: No Exceptions      Output:    I/O last 3 completed shifts:  In: 5511.04 [I.V.:3709.04; Other:7; NG/GT:715]  Out: 7587 [Other:4587; Stool:3000]  Containment: of urine/stool: Incontinence Protocol, Flexi-Seal and Urinary Catheter    Risk Assessment:   Sensory Perception: 1-->completely limited  Moisture: 4-->rarely moist  Activity: 1-->bedfast  Mobility: 1-->completely immobile  Nutrition: 3-->adequate  Friction and Shear: 1-->problem  Nacho Score: 11    Labs:    Recent Labs   Lab 04/08/22  1105 04/08/22  0347 04/02/22  0355 04/01/22  2205   ALBUMIN 2.0*  2.0* 2.0*   < >  --    HGB 7.1* 7.5*   < > 13.5   INR 1.12 1.10   < > 1.76*   WBC 17.9* 17.9*   < > 25.9*   A1C  --   --   --  5.3   CRP  --  42.0*   < >  --     < > = values in this interval not displayed.       Focused Assessment:  Right groin ECMO cannula    Pressure Injury Present::No     Skin injury to midline chest due to DENI    4/3    Education provided to: nurse  Discussed importance of:their role in pressure injury prevention  Discussed plan of care with Nurse  WOC  Nurse follow-up plan:weekly    Saba Angulo RN CWOCN

## 2022-04-08 NOTE — PROGRESS NOTES
ECMO Shift Summary:    ECMO Equipment:  Console serial number: [id number]  Circuit Lot number: [id number]  Oxygenator Lot number: [id number]    Patient remains on VA ECMO, all equipment is functioning and alarms are appropriately set. RPM's 3560 with flow range ~3.8 L/min. Sweep gas is at 0.5 LPM and FiO2 60%. Circuit remains free of visible air, clot and fibrin. Cannulas are secure with no bleeding from right fem site. Extremities are swollen.     Significant Shift Events:   Increased Heparin needs overnight.    Vent settings:  SIMV+PS 6/500/+5/+5/60%. Suctioned ETT for large creamy pale yellow/tan secretions.    Heparin is running at 1000 unit(s)/hr (13 u/kg/hr), ACT range 156-169. Given Heparin bolus x1 in an attempt to reach ACT goals.    Urine output is nil, on CRRT. Blood loss was not appreciated. Product was not given.       Intake/Output Summary (Last 24 hours) at 2022 0538  Last data filed at 2022 0500  Gross per 24 hour   Intake 5601.04 ml   Output 7636 ml   Net -2034.96 ml       ECHO:  No results found for this or any previous visit.  No results found for this or any previous visit.      CXR:  Recent Results (from the past 24 hour(s))   Echo Limited    Narrative    [id number]  [id number]  [id number]  [id number]^PAULINA^DONNA     [facility],[place]  Echocardiography Laboratory  [address] [place] [id number]     Name: TIERNEY CARVALHO  MRN: [id number]  : 1985  Study Date: 2022 04:05 PM  Age: 36 yrs  Gender: Male  Patient Location: [facility]  Reason For Study: Cardiac Arrest  Ordering Physician: DONNA GALLARDO  Performed By: Merna Gaming     BSA: 2.1 m2  Height: 71 in  Weight: 194 lb  ______________________________________________________________________________  Procedure  Limited Portable Echo Adult.  ______________________________________________________________________________  Interpretation Summary  A left pleural effusion is present.  This is a limited TTE for  VA ECMO turnd down     Baseline: 3.8 LPM  LVEF is 55-60%  LVIDd is 4.1cm and the septum is midline  The aortic valve opens with each cardiac cycle  The right ventricular function is normal.     Minimal flow: 1.0 LPM with the IABP off  LVEF is >705%  LVIDd is 3.5 cm and the septum is midline  The aortic valve opens with each cardiac cycle  The right ventricular function is normal to hyperdynamic.     Compared to prior imaging on 4/6/2022 there is no signficnat difference in rv  or lv function but there is concern for possible ra thrombus (echodensity).  ______________________________________________________________________________  Atria  There is a mobile echodensity in the right atrium concerning for thrombus. It  measures 1.8X1.3cm and is appears attached to either the superior atrial  septum or superior wall of the atrium but can not rule out attachement to the  cannual.     Mitral Valve  The mitral valve is normal.     Aortic Valve  Aortic valve is normal in structure and function.     Tricuspid Valve  The tricuspid valve is normal. Not assessed with Doppler. The peak velocity of  the tricuspid regurgitant jet is not obtainable. Pulmonary artery systolic  pressure cannot be assessed.     Pulmonic Valve  The valve leaflets are not well visualized. Not assessed with Doppler.     Vessels  There is an ecmo cannula in the ivc.     Pericardium  No pericardial effusion is present.     Miscellaneous  This is a limited TTE for VA ECMO turnd down     Baseline: 3.8 LPM  LVEF is 50-55%  LVIDd is 4.1cm and the septum is midline  The aortic valve opens with each cardiac cycle  The right ventricular function is normal.     Minimal flow: 1.0 LPM with the IABP off  LVEF is 60-65%  LVIDd is 3.5 cm and the septum is midline  The aortic valve opens with each cardiac cycle  The right ventricular function is normal to hyperdynamic. A left pleural  effusion is present.     Compared to Previous Study  Compared to prior imaging on  4/6/2022 there is no signficnat difference in rv  or lv function but there is concern for possible ra thrombus (echodensity).  ______________________________________________________________________________  MMode/2D Measurements & Calculations  LVIDd: 4.1 cm     ______________________________________________________________________________  Report approved by: Jacki Ladd 04/07/2022 05:48 PM             Labs:  Recent Labs   Lab 04/08/22  0348 04/08/22  0347 04/08/22  0155 04/08/22  0014 04/07/22  2157 04/07/22 2156   PH 7.42  --  7.45 7.44  --  7.45   PCO2 30*  --  28* 28*  --  28*   PO2 116*  --  124* 112*  --  124*   HCO3 19*  --  20* 19*  --  20*   O2PER 60  60  60 60 60  60 60  60   < > 60    < > = values in this interval not displayed.       Lab Results   Component Value Date    HGB 7.5 (L) 04/08/2022    PHGB 30 (H) 04/08/2022     (L) 04/08/2022    FIBR 683 (H) 04/08/2022    INR 1.10 04/08/2022    PTT 41 (H) 04/08/2022    DD 6.35 (H) 04/08/2022    ANTCH 65 (L) 04/07/2022         Plan is to continue support, family care conference today.    Merna Page, RT  ECMO Specialist  4/8/2022 5:38 AM

## 2022-04-08 NOTE — PROGRESS NOTES
[facility] Note:    Donor Evaluation and Work-Up    If NOT donor designated: This patient has been authorized by Dayana for organ donation.  (He meets criteria for Donation After Circulatory Death.)      Organs being evaluated:  Liver  Kidneys and Pancreas    Research : Authorized    Testing/imaging is needed based on each organ system. The following tests are anticipated.        Liver    o CT abdomen/pelvis ( completed)  Abdominal Ultrasound        All above organs along with kidneys, pancreas, and intestine    o Routine lab work (Q6H)    Shift Summary and Expectations:      Family Needs/Requests: none      Plan for this shift: [facility] will be onsite this shift then transition to remote management    o Communicate while off-site via phone      Notify the Donation Coordinator if below parameters are not being met:    o Fluid status      Maintain total IV fluid ~125 mL/hr      Central venous pressure goal 4-10 mmHg    o Cardiac      Systolic blood pressure goal  mmHg      Mean arterial pressure goal: >60      Heart rate goal  bpm    o Respiratory      Oxygen Sat goal >95%    o Liver      Serum sodium level < 155 mEq/L      Free water flushes of (5mL/kg) Q1H -- clamp 30 minutes - Low intermittent suction for 30 minutes    o Kidney      Urine output goal at least 0.5 ml/kg/hour and less than 3ml/kg/hour.    Thank you for your support.  Belkis Baxter  Donation Coordinator

## 2022-04-08 NOTE — PROGRESS NOTES
"CLINICAL NUTRITION SERVICES - REASSESSMENT NOTE    Note: Per Palliative Social Work note today: \"Pt's parents have made the decision to transition pt to comfort cares.\" Nutrition interventions remain in place but can be discontinued by primary team. No further interventions planned at this time. RD can be consulted if needed.    RD signing off on 4/8/2022.    ----------------------------------     Nutrition Prescription  Malnutrition Diagnosis  Unable to determine due to not appropriate to assess all criteria at this time    Recommendations already ordered by Registered Dietitian (RD):  Continue EN support as ordered or discontinue per team discretion given comfort cares:  Novasource Renal @ 45 mL/hr (1080 mL) + 2 pkts ProSource QID (8 pkts daily) = 2480 kcal (30 kcal/kg), 186 g PRO (2.2 g/kg), 198 g CHO, and 778 mL water daily.        EVALUATION OF THE PROGRESS TOWARD GOALS   Diet: NPO    Enteral Access: PEG/J - feeds via J-tube    FWF: 30 mL q4h    Nutrition Support: EN  4/3-Current: Novasource Renal @ 45 mL/hr (1080 mL) + 2 pkts ProSource QID (8 pkts daily) = 2480 kcal (30 kcal/kg), 186 g PRO (2.2 g/kg), 198 g CHO, and 778 mL water daily.     Enteral Intake: Reached goal TF rate on 4/4 @ 1600. Tolerating TF at goal rate.    -->3-day average enteral nutrition infusions: 1065 mL TF + 8 ProSource protein packets = 2450 kcals (29 kcal/kg, or >100% minimum assessed kcal needs) and 185 g protein (2.2 g protein/kg, or >100% minimum assessed protein needs).     NEW FINDINGS   -General: Ongoing GOC discussions.    -Wt trends: Wt fluctuations likely 2/2 fluid  04/08/22 0200 85.3 kg (188 lb 0.8 oz)   04/07/22 0400 88.2 kg (194 lb 7.1 oz)   04/06/22 0000 87.7 kg (193 lb 5.5 oz)   04/05/22 0600 85.3 kg (188 lb 0.8 oz)   04/04/22 0000 87.5 kg (192 lb 14.4 oz)   04/03/22 0000 86.1 kg (189 lb 13.1 oz)   04/02/22 0000 84.2 kg (185 lb 10 oz)      -Labs: Reviewed, notable for: Na+ 151 (H), K+ 4.5 (WNL), BUN 77 (H, improved), Cr " "3.06 (H, improved), Phos 6.0 (H), lactic acid 1.2 (WNL)    -Cardio: VA ECMO, IABP both on 4/1.     -GI: Rectal tube with 2.5 L output yesterday per I/Os.    -Neuro: Per Neuro note on 4/7 - \"EEG remains extremely attenuated and near isoelectric state despite being off all sedation, which is a very poor prognostic factor\"     -Renal: Acute renal failure, CRRT initiated 4/3    -Respiratory: Intubated on mechanical ventilation.    -Skin: WOC RN following d/t pressure prevention with VA ECMO    -Meds & Vitamin/Mineral Supplementation: Reviewed, notable for: Nephronex, Folvite, thiamine 100 mg    MALNUTRITION  % Intake: Decreased intake does not meet criteria  % Weight Loss: None noted  Subcutaneous Fat Loss: Deferring d/t current GOC discussions  Muscle Loss: Deferring d/t current GOC discussions  Fluid Accumulation/Edema: Moderate (3+ anasarca per RN flowsheets)  Malnutrition Diagnosis: Unable to determine due to not appropriate to assess all criteria at this time    Previous Goals   Total avg nutritional intake to meet a minimum of 25 kcal/kg and 1.8 g PRO/kg daily (per dosing wt 60 kg).  Evaluation: Met    Previous Nutrition Diagnosis  Inadequate protein-energy intake related to NPO status in setting of intubation as evidenced by pt currently meeting 0% minimum assessed needs (not accounting for kcal from lipid/dextrose-containing medications).  Evaluation: Resolved    CURRENT NUTRITION DIAGNOSIS  Inadequate oral intake related to NPO status in setting of intubation as evidenced by continued need for EN support to meet full nutrition needs.     INTERVENTIONS  Implementation  Enteral Nutrition - Continue as ordered or discontinue pending GOC    Goals  Total avg nutritional intake to meet a minimum of 25 kcal/kg and 1.5 g PRO/kg daily (per dosing wt 60 kg).    Monitoring/Evaluation  Progress toward goals will be monitored and evaluated per protocol.     Kassidy Thurman RD, LD  Pager: [phone]  "

## 2022-04-08 NOTE — PROGRESS NOTES
CRRT STATUS NOTE    DATA:  Time: 6:22 AM   Pressures WNL:  YES  Filter Status:  WDL    Problems Reported/Alarms Noted:  None    Supplies Present:  YES    ASSESSMENT:  Patient Net Fluid Balance:  At midnight -3417.8mL at 0600 +482.5mL    Vital Signs:    Arterial Line BP:  123/67 (93) mmHg, T: 98.6, P: 91, R: 18, Wt: 188 lbs .84 oz     Labs: Recent Labs   Lab Test 04/08/22  0347 04/08/22 0155 04/07/22 2157   * 152* 152*  152*   POTASSIUM 4.2 4.3 4.2  4.2   CHLORIDE 122* 122* 123*  123*   CO2 19* 22 21  21   BUN 81* 82* 82*  82*   CR 2.76* 3.15* 3.26*  3.26*   MAG 2.4*  --  2.5*   PHOS 6.0*  --  5.8*   ICA 4.2  4.2     2gm CaGluc replacement given.             Recent Labs   Lab Test 04/08/22 0347 04/07/22 2157   WBC 17.9* 16.7*   HGB 7.5* 7.6*   * 105*          Goals of Therapy: net neg 1-2L/24h    INTERVENTIONS:   None at this time.    PLAN:  Continue with treatment goal. Call Resource RN with questions and concerns at [phone].

## 2022-04-09 NOTE — PROGRESS NOTES
"  Nephrology Progress Note  04/09/2022     Michael Callejas is a 36 year old male with PMH of colon cancer dx'd at age 16 ( per mother no longer resectable), substance use d/o, admitted following out of hospital PEA arrest 2/2 Meth overdose which progressed to VT/Cardiogenic shock requiring VA Ecmo/IABP c/b probable anoxic brain injury and HALEIGH. Baseline creat unknown.       Assessment & Recommendations:     1. HALEIGH - Remains dialysis dependent, anuric.     - Etiology of his HALEIGH is ATN from cardiogenic shock   - Consent obtained from mother  -CRRT continues through the ecmo circuit for volume and solute control  -family discussing goals of care/ withdrawal/ [facility] assessing.     2. Volume status - Mild hypervolemia. He is intubated. Weight 88.2 kg.  Admission weight 84.2 kg.  - weight today  - monitor        3. Cardiogenic shock - On VA ecmo   - Per CV     4. Electrolytes - No acute concerns. K 4.5, Na 141    - Running on a K 4 bath     5. Cerebral edema - Receiving 3% NS to maintain Na ~ 150. Currently running at 80 ml/hr     6.Antimicrobials - Vanco/Zosyn. WBC 18.     Recommendations were communicated to primary team via progress note      Taisha Louis Abraham MD   Division of Renal Disease and Hypertension  [facility]  myairmail  Vocera Web Console    Interval History :   Nursing and provider notes from last 24 hours reviewed.  Tolerating CRRT , family discussing withdrawal and organ donation.    Review of Systems:   I reviewed the following systems:  Unable to obtain given sedation/intubation    Physical Exam:   I/O last 3 completed shifts:  In: 5427.77 [I.V.:3908.77; Other:4; NG/GT:270]  Out: 5299 [Urine:150; Emesis/NG output:1150; Other:2549; Stool:1450]   Pulse 75   Temp 97.7  F (36.5  C)   Resp 12   Ht 1.803 m (5' 11\")   Wt 87 kg (191 lb 12.8 oz)   SpO2 98%   BMI 26.75 kg/m       GENERAL APPEARANCE: sedated  Pulmonary: lungs diminished. On Vent   CV: On VA Ecmo/IABP   - Edema - minimal  GI: soft, " nondistended.   MS: unable to assess  SKIN: no rash, warm, dry, no cyanosis  NEURO: sedated  ACCESS: Ecmo circuit    Labs:   All labs reviewed by me  Electrolytes/Renal -   Recent Labs   Lab Test 04/09/22  1344 04/09/22  1208 04/09/22  1203 04/09/22  1009 04/09/22  0335 04/09/22 0333 04/08/22 2205     --  142 142   < > 143  143 145*  145*  145*   POTASSIUM 3.7  --  3.7 3.8   < > 4.2  4.2 4.4  4.4  4.4   CHLORIDE 109  --  110* 111*   < > 114*  114* 115*  115*  115*   CO2 24  --  23 23   < > 23  23 23  23  23   BUN 52*  --  56* 55*   < > 64*  64* 67*  67*  67*   CR 2.69*  --  2.81* 2.75*   < > 2.76*  2.76* 2.96*  2.96*  2.96*   GLC 90 95 96 94  91   < > 119*  119* 140*  140*  140*  138*   SAMIR 7.5*  --  7.7* 7.3*   < > 7.0*  7.0* 7.3*  7.3*  7.3*   MAG  --   --  2.4* 2.4*  --  2.3 2.5*   PHOS  --   --  5.4*  --   --  7.0* 7.9*    < > = values in this interval not displayed.       CBC -   Recent Labs   Lab Test 04/09/22  1203 04/09/22  1009 04/09/22 0333   WBC 18.2* 17.1* 18.0*   HGB 7.6* 7.3* 7.0*   PLT 86* 90* 95*       LFTs -   Recent Labs   Lab Test 04/09/22  1203 04/09/22  1009 04/09/22  0333 04/08/22  2205   ALKPHOS  --  122 124 165*   BILITOTAL  --  1.3 0.7 0.7   ALT  --  200* 207* 278*   AST  --  113* 130* 168*   PROTTOTAL  --  4.8* 4.8* 5.9*   ALBUMIN 1.7* 1.6* 1.8* 2.0*  2.0*       Iron Panel - No lab results found.      Imaging:  EXAMINATION:  XR CHEST PORT 1 VIEW 4/7/2022 1:15 AM.     COMPARISON: 4/6/2022     HISTORY:  Check endotracheal tube placement and ECLS cannula  placement. DO NOT log-roll patient.  Place film under patient using  patient safety handling process.     FINDINGS: Frontal view. Unchanged endotracheal tube, temperature  probe, gastric tube, ECMO cannula. IABP marker at the level of the  lizabeth unchanged from prior exam. Minimally hazy left basilar  opacities unchanged. Right lung is clear.                                                                       IMPRESSION: Stable support devices and pulmonary opacities.     I have personally reviewed the examination and initial interpretation  and I agree with the findings.     MIGUEL BARR MD          Current Medications:    [START ON 4/10/2022] amiodarone  200 mg Oral or Feeding Tube BID     [START ON 4/17/2022] amiodarone  200 mg Oral or Feeding Tube Daily     amiodarone  400 mg Oral or Feeding Tube BID     B and C vitamin Complex with folic acid  5 mL Per Feeding Tube Daily     chlorhexidine  15 mL Swish & Spit BID     folic acid  1 mg Per Feeding Tube Daily     pantoprazole  40 mg Oral or Feeding Tube QAM AC     piperacillin-tazobactam  3.375 g Intravenous Q6H     protein modular  2 packet Per Feeding Tube 4x Daily     technecium exametazine radioisotope injection  16-24 mCi Intravenous Once     thiamine  100 mg Per Feeding Tube Daily     vancomycin  1,750 mg (central catheter) Intravenous Q24H       dextrose Stopped (04/04/22 0400)     D5W 50 mL/hr at 04/09/22 1502     CRRT replacement solution 12.5 mL/kg/hr (04/09/22 1217)     heparin (PRESSURE BAG) 2 unit/mL in 0.9% NaCl 3 mL/hr (04/09/22 1500)     HEParin Stopped (04/08/22 1814)     midazolam Stopped (04/08/22 2139)     niCARdipine (CARDENE) infusion ADULT/PEDS GREATER than or EQUAL to 45 kg max conc Stopped (04/08/22 2146)     - MEDICATION INSTRUCTIONS -       norepinephrine Stopped (04/09/22 1300)     CRRT replacement solution 200 mL/hr at 04/09/22 0810     CRRT replacement solution 12.5 mL/kg/hr (04/09/22 1218)     Taisha Abraham MD

## 2022-04-09 NOTE — PROGRESS NOTES
CRRT STATUS NOTE    DATA:  Time:  0525 AM    Pressures WNL:  YES    Filter Status:  WDL    Problems Reported/Alarms Noted:  None    Supplies Present:  YES    ASSESSMENT:  Patient Net Fluid Balance:   04/08/2022 I/O= -1263.71cc    Vital Signs 0400:    T=97.7 (esophageal), HR=83, RR=7, BP=92/59 (MAP=75), SPO2=98% on FIO2 60% via vent.                          Labs:    Labs monitored and reviewed.    ROUTINE ICU LABS (Last four results)  CMPRecent Labs   Lab 04/09/22  0335 04/09/22  0333 04/08/22  2205 04/08/22  1956 04/08/22  1732 04/08/22  1604 04/08/22  1603 04/08/22  1106 04/08/22  1105 04/08/22  0348 04/08/22  0347   NA  --  143  143 145*  145*  145*  --  149*  --  148*   < > 152*  152*   < > 151*   POTASSIUM  --  4.2  4.2 4.4  4.4  4.4  --  4.2  --  5.1   < > 4.2  4.2   < > 4.2   CHLORIDE  --  114*  114* 115*  115*  115*  --  119*  --  119*   < > 122*  122*   < > 122*   CO2  --  23  23 23  23  23  --  21  --  21   < > 21  21   < > 19*   ANIONGAP  --  6  6 7  7  7  --  9  --  8   < > 9  9   < > 10   * 119*  119* 140*  140*  140*  138*   < > 137*   < > 138*   < > 126*  126*   < > 133*   BUN  --  64*  64* 67*  67*  67*  --  72*  --  75*   < > 74*  74*   < > 81*   CR  --  2.76*  2.76* 2.96*  2.96*  2.96*  --  2.93*  --  2.84*   < > 2.97*  2.97*   < > 2.76*   GFRESTIMATED  --  30*  30* 28*  28*  28*  --  28*  --  29*   < > 27*  27*   < > 30*   SAMIR  --  7.0*  7.0* 7.3*  7.3*  7.3*  --  7.3*  --  7.6*   < > 7.7*  7.7*   < > 7.8*   MAG  --  2.3 2.5*  --   --   --  2.5*  --  2.4*  --  2.4*   PHOS  --  7.0* 7.9*  --   --   --   --   --  5.9*  --  6.0*   PROTTOTAL  --  4.8* 5.9*  --   --   --  6.5*  --  5.7*  --  5.6*   ALBUMIN  --  1.8* 2.0*  2.0*  --   --   --  2.0*  --  2.0*  2.0*  --  2.0*   BILITOTAL  --  0.7 0.7  --   --   --  0.5  --  0.5  --  0.5   ALKPHOS  --  124 165*  --   --   --  156*  --  164*  --  149   AST  --  130* 168*  --   --   --   --   --  194*  --   223*   ALT  --  207* 278*  --   --   --  268*  --  307*  --  330*    < > = values in this interval not displayed.     CBC  Recent Labs   Lab 04/09/22  0333 04/08/22  2205 04/08/22  1603 04/08/22  1105   WBC 18.0* 20.8* 18.3* 17.9*   RBC 2.23* 2.28* 2.12* 2.15*   HGB 7.0* 7.4* 6.9* 7.1*   HCT 22.3* 23.3* 21.8* 22.4*    102* 103* 104*   MCH 31.4 32.5 32.5 33.0   MCHC 31.4* 31.8 31.7 31.7   RDW 15.9* 15.9* 14.8 14.6   PLT 95* 113* 119* 111*     INR  Recent Labs   Lab 04/09/22 0333 04/08/22 2205 04/08/22  1603 04/08/22  1105   INR 1.17* 1.12 1.19* 1.12     Arterial Blood Gas  Recent Labs   Lab 04/09/22  0335 04/09/22  0149 04/08/22  2357 04/08/22  2205   PH 7.39 7.37 7.34* 7.21*   PCO2 37 38 42 56*   PO2 193* 174* 110* 96   HCO3 22 22 23 23   O2PER 60  60  60 60 60 60     0335 ICa=4.2, LA=0.9    One unit of PRBCs given overnight.    Goals of Therapy:    Net negative 1-2 liters/24 hrs    INTERVENTIONS:   None    PLAN:  Continue to monitor.  Change CRRT set q72hrs and PRN.  Call CRRT RN at [phone] with questions.  See flowsheets for details.

## 2022-04-09 NOTE — PROVIDER NOTIFICATION
2000 Dr. Vallejo pupil fixed and unequal via pupilmeter. Pt still overbreathing vent otherwise no response to pain, no cough, or no gag. Updated Destinee with [facility], no changes to current plan of care.     2100  Dr. Vallejo notified pt pulling tidal volumes 1000 ml - 1500 cc, UE tremoring and UE suggestive of decorticate posturing.     2139  Dr. Vallejo notified no longer overbreathing vent. Neuro-crit consulted and called to bedside. [facility] updated. Nicardipine stopped d/t hypotensive. Versed gtt turned off.     2215  Dr. Vallejo unable to maintain MAP > 65 with no longer pulling fluid with CRRT. Plan to start Levophed gtt.  [facility] updated.

## 2022-04-09 NOTE — PROGRESS NOTES
Brief Neurocritical Care Note:    Went to bedside to perform neurologic examination.      Mental status: comatose, unresponsive to noxious and verbal stimulation  Cranial nerves: pupils fixed and dilated, corneals absent, absent cough and gag, absent oculocephalics, absent cold calorics  Sensorimotor: no spontaneous movements and no withdrawal to noxious stimulation    Patient triggered two breaths when he was switched to pressure support.    Examination is not consistent with brain death so will not proceed with nuc med testing.    Call [phone] with additional questions.    Delisa Carlton MD

## 2022-04-09 NOTE — PROGRESS NOTES
ECMO Shift Summary: 1900-0700      ECMO Equipment:  Console serial number: [id number]  Circuit Lot number: [id number]  Oxygenator Lot number: [id number]    Patient remains on VA ECMO, all equipment is functioning and alarms are appropriately set. RPM's 3560 with flow range 3.74-3.98 L/min. Sweep gas is at 2 LPM and FiO2 60%. Circuit remains free of air, clot and fibrin. Cannulas are secure with no bleeding from site. Extremities are warm.     Significant Shift Events:   Patient stopped overbreathing ventilator. Titrated sweep in response to ABGs.     Vent settings:  Vent Mode: SIMV/PS  (Synchronized Intermittent Mandatory Ventilation with Pressure Support)  FiO2 (%): 60 %  Resp Rate (Set): 7 breaths/min  Tidal Volume (Set, mL): 500 mL  PEEP (cm H2O): 5 cmH2O  Pressure Support (cm H2O): 5 cmH2O  Resp: (!) 7  .    Heparin is off per MD, ACT range 135-148.    Patient on CRRT, blood loss was minimal. Product given included PRBC x2.       Intake/Output Summary (Last 24 hours) at 4/9/2022 0526  Last data filed at 4/9/2022 0515  Gross per 24 hour   Intake 5555.35 ml   Output 6306 ml   Net -750.65 ml       ECHO:  No results found for this or any previous visit.  No results found for this or any previous visit.      CXR:  Recent Results (from the past 24 hour(s))   US Abdominal Aorta Imaging    Narrative    ULTRASOUND ABDOMINAL AORTA 4/8/2022 4:44 PM    CLINICAL HISTORY: Per [facility] for organ donation.     COMPARISONS: CT chest, abdomen, and pelvis 4/1/2022.    REFERRING PROVIDER: DONNA GALLARDO    TECHNIQUE: Aorta and iliac arteries evaluated with grayscale imaging.  Cine clip saved in the patient's record.    FINDINGS:   Aorta:       Proximal: 2.2 cm       Mid: 2.6 cm       Distal: 1.9 cm    Right common iliac artery: 1.2 cm    Left common iliac artery: 1.3 cm.    Aortic balloon pump in place.      Impression    IMPRESSION:  1. Ectatic mid aorta measures 2.6 cm. Aortic balloon pump in place.    Aorta diameter measurement  classification:  < 2.5 cm: Normal  2.5 - 2.9 cm: Ectatic  > 3 cm: Aneurysmal    Iliac artery:  Males: > or = 1.7 cm: Ectatic  Females: > or = 1.5 cm: Ectatic  > 2.5 cm: Aneurysmal    PEG FISHER MD         SYSTEM ID:  [id number]   US Abdomen Complete    Narrative    EXAMINATION: US ABDOMEN COMPLETE  4/8/2022 7:35 PM      CLINICAL HISTORY: [facility] requires for transplant work up    COMPARISON: Ultrasound for 4/8/22    FINDINGS:  The liver is normal in contour and echogenicity. There is no  intrahepatic or extrahepatic biliary ductal dilatation. The common  bile duct measures 3.2 mm. The gallbladder is normal, without  gallstones or pericholecystic fluid. Decompressed gallbladder with  mildly thickened wall secondary to degree of distention. Patient not  fasting for this study.    The spleen measures maximally 8.3 cm and is normal in appearance. The  visualized portions of the pancreas is largely obscured by overlying  bowel gas.    The visualized upper abdominal aorta and inferior vena cava are  normal.      The kidneys are normal in position and echogenicity. The right kidney  measures 10.2 cm and the left kidney measures 10.1 cm. There is no  significant urinary tract dilation      Impression    IMPRESSION:   Normal abdominal ultrasound.    I have personally reviewed the examination and initial interpretation  and I agree with the findings.    REGIS ORLANDO MD         SYSTEM ID:  [id number]       Labs:  Recent Labs   Lab 04/09/22  0335 04/09/22  0149 04/08/22  2357 04/08/22  2205   PH 7.39 7.37 7.34* 7.21*   PCO2 37 38 42 56*   PO2 193* 174* 110* 96   HCO3 22 22 23 23   O2PER 60  60  60 60 60 60       Lab Results   Component Value Date    HGB 7.0 (L) 04/09/2022    PHGB <30 04/09/2022    PLT 95 (L) 04/09/2022    FIBR 495 (H) 04/09/2022    INR 1.17 (H) 04/09/2022    PTT 31 04/09/2022    DD >20.00 (HH) 04/09/2022    ANTCH 77 (L) 04/08/2022         Plan is continue VA ECMO.      Sammi Corcoran, RRT-NPS  ECMO  Specialist  4/9/2022 5:26 AM

## 2022-04-09 NOTE — PROGRESS NOTES
ECMO Shift Summary:      ECMO Equipment:  Console serial number: [id number]  Circuit Lot number: [id number]  Oxygenator Lot number: [id number]        Patient remains on VA ECMO, all equipment is functioning and alarms are appropriately set. RPM's 3650 with flow range 3.89-4.08 L/min. Sweep gas is at 2 LPM and FiO2 60%. Circuit remains free of air and clot, there is a small amount of fibrin at connectors. Cannulas are secure with no bleeding from site. Extremities are warm to touch.    Significant Shift Events: None    Vent settings:  Vent Mode: SIMV/PS  (Synchronized Intermittent Mandatory Ventilation with Pressure Support)  FiO2 (%): 60 %  Resp Rate (Set): 7 breaths/min  Tidal Volume (Set, mL): 500 mL  PEEP (cm H2O): 5 cmH2O  Pressure Support (cm H2O): 5 cmH2O  Resp: 9  .    Heparin is off, ACT range 143-148.    Pt on CRRT -100/mlhr, blood loss was from mouth , OG tube and rectum. Product given included x1 prbc.       Intake/Output Summary (Last 24 hours) at 4/9/2022 1809  Last data filed at 4/9/2022 1700  Gross per 24 hour   Intake 5161.77 ml   Output 3508 ml   Net 1653.77 ml       ECHO:  No results found for this or any previous visit.  No results found for this or any previous visit.      CXR:  Recent Results (from the past 24 hour(s))   US Abdomen Complete    Narrative    EXAMINATION: US ABDOMEN COMPLETE  4/8/2022 7:35 PM      CLINICAL HISTORY: [facility] requires for transplant work up    COMPARISON: Ultrasound for 4/8/22    FINDINGS:  The liver is normal in contour and echogenicity. There is no  intrahepatic or extrahepatic biliary ductal dilatation. The common  bile duct measures 3.2 mm. The gallbladder is normal, without  gallstones or pericholecystic fluid. Decompressed gallbladder with  mildly thickened wall secondary to degree of distention. Patient not  fasting for this study.    The spleen measures maximally 8.3 cm and is normal in appearance. The  visualized portions of the pancreas is largely  obscured by overlying  bowel gas.    The visualized upper abdominal aorta and inferior vena cava are  normal.      The kidneys are normal in position and echogenicity. The right kidney  measures 10.2 cm and the left kidney measures 10.1 cm. There is no  significant urinary tract dilation      Impression    IMPRESSION:   Normal abdominal ultrasound.    I have personally reviewed the examination and initial interpretation  and I agree with the findings.    REGIS ORLANDO MD         SYSTEM ID:  [id number]       Labs:  Recent Labs   Lab 04/09/22  1748 04/09/22  1605 04/09/22  1345 04/09/22  1202   PH 7.38 7.40 7.41 7.38   PCO2 39 38 38 39   PO2 102 161* 148* 160*   HCO3 23 24 24 23   O2PER 60 60  60  60 60 60       Lab Results   Component Value Date    HGB 7.8 (L) 04/09/2022    PHGB <30 04/09/2022    PLT 87 (L) 04/09/2022    FIBR 341 04/09/2022    INR 1.15 04/09/2022    PTT 37 04/09/2022    DD >20.00 (HH) 04/09/2022    ANTCH 68 (L) 04/09/2022         Plan is organ donation tomorrow at 0900.      Issac Linder, RT  ECMO Specialist  4/9/2022 6:09 PM

## 2022-04-09 NOTE — PHARMACY-VANCOMYCIN DOSING SERVICE
Pharmacy Vancomycin Initial Note  Date of Service 2022  Patient's  1985  36 year old, male    Indication: Aspiration Pneumonia    Current estimated CrCl = CRRT    Creatinine for last 3 days  2022:  3:55 AM Creatinine 4.15 mg/dL;  5:41 AM Creatinine 4.09 mg/dL;  7:50 AM Creatinine 3.83 mg/dL;  9:55 AM Creatinine 3.72 mg/dL; 12:08 PM Creatinine 3.71 mg/dL;  1:53 PM Creatinine 3.52 mg/dL;  3:59 PM Creatinine 3.53 mg/dL;  5:54 PM Creatinine 3.41 mg/dL;  7:50 PM Creatinine 3.34 mg/dL;  9:57 PM Creatinine 3.26 mg/dL;  9:57 PM Creatinine 3.26 mg/dL  2022: 12:14 AM Creatinine 3.21 mg/dL;  1:55 AM Creatinine 3.15 mg/dL;  3:47 AM Creatinine 2.76 mg/dL;  6:03 AM Creatinine 3.08 mg/dL;  7:48 AM Creatinine 3.06 mg/dL; 11:05 AM Creatinine 2.97 mg/dL; 11:05 AM Creatinine 2.97 mg/dL;  1:32 PM Creatinine 2.95 mg/dL;  4:03 PM Creatinine 2.84 mg/dL;  5:32 PM Creatinine 2.93 mg/dL; 10:05 PM Creatinine 2.96 mg/dL; 10:05 PM Creatinine 2.96 mg/dL; 10:05 PM Creatinine 2.96 mg/dL  2022:  3:33 AM Creatinine 2.76 mg/dL;  3:33 AM Creatinine 2.76 mg/dL;  7:42 AM Creatinine 2.80 mg/dL; 10:09 AM Creatinine 2.75 mg/dL; 12:03 PM Creatinine 2.81 mg/dL;  1:44 PM Creatinine 2.69 mg/dL    Recent Vancomycin Level(s) for last 3 days  2022:  3:55 AM Vancomycin 18.8 mg/L      Vancomycin IV Administrations (past 72 hours)                   vancomycin 1500 mg in 0.9% NaCl 250 ml intermittent infusion 1,500 mg (mg) 1,500 mg New Bag 22 0741     1,500 mg New Bag 22 0757                Nephrotoxins and other renal medications (From now, onward)    Start     Dose/Rate Route Frequency Ordered Stop    22 1500  vancomycin (VANCOCIN) 1,750 mg in sodium chloride 0.9 % 250 mL intermittent infusion         1,750 mg (central catheter)  over 90 Minutes Intravenous EVERY 24 HOURS 22 1502      22 1200  piperacillin-tazobactam (ZOSYN) 3.375 g vial to attach to  mL bag        Note to Pharmacy: For  "[facility], [facility] and [facility]: For Zosyn-naive patients, use the \"Zosyn initial dose + extended infusion\" order panel.    3.375 g  over 30 Minutes Intravenous EVERY 6 HOURS 04/09/22 1114      04/08/22 2230  norepinephrine (LEVOPHED) 16 mg in  mL infusion MAX CONC CENTRAL LINE         0.01-0.6 mcg/kg/min × 88 kg (Dosing Weight)  0.8-49.5 mL/hr  Intravenous CONTINUOUS 04/08/22 2213            Contrast Orders - past 72 hours (72h ago, onward)            None              Plan:  1. Start vancomycin  1750 mg IV q24h.   2. Vancomycin monitoring method: Renal Replacement Therapy  3. Vancomycin therapeutic monitoring goal: 15-20 mg/L  4. Pharmacy will check vancomycin levels as appropriate in 1-3 Days.    5. Serum creatinine levels will be ordered daily for the first week of therapy and at least twice weekly for subsequent weeks.      Kathy Mckeon, PharmD    "

## 2022-04-09 NOTE — PROGRESS NOTES
[facility] Note:    Allocation    Organs intended for Donation:  Liver  Kidneys  Pancreas  Intestine    OR Estimate: Date 4/9-4/10    [facility] is currently on site and may transition to remote management depending on OR timing and then will return 2 hours prior to OR    Delays none expected    Thank you for your support   Belkis Baxter  Donation Coordinator

## 2022-04-09 NOTE — PROGRESS NOTES
[facility]  Cardiac ICU Progress Note             Assessment and Plan:   Mr Michael Callejas is a 36 year old adult male who was admitted on 4/1/2022. Patient was brought by EMS who gave the following report. EMS was called at 12:54 pm to the patient's residence where he had suffered a presumed cardiac arrest after taking methamphetamine and DMT. Per EMS, CPR was administered by room mates. Unclear how long down time prior TO room mates administered CPR or to EMS call. On arrival, EMS noted patient to be in PEA arrest with CPR continued and patient was given total 4 doses of Epi, once of bicarb, one of calcium and 3 doses of Narcan (one ?inhaled, 2 IV). He was also shocked 4 times by EMS for periods of VFib electrical activity enroute to the hospital. Of note, ROSC was never achieved by EMS. On presentation to the Cath lab, patient was in asystole with DENI delivered CPRS continued. Initial blood gas showed a pH of 6.7, O2 of 36. He was cannulated for VA ECMO at 2:08 pm. Post cannulation patient was alternating between PEA and VF with additional 3 total 200 J shocks delivered. Post cannulation coronary angiogram showed clean coronary arteries. Epi, Norepi, Vaso and Phenylephrine was started at high doses and patient was given multiple boluses of Amiodarone. Additionally, an intra-aortic balloon pump and thermogard was placed with patient transported to CT for brain imaging prior to arriving to the ICU for continued management. Family agrees with futility of further treatment. Currently being evaluated by [facility] for organ donation.     Changes today:  -[facility] on board for organ procurement.  -Neurocrit eval for brain death      Neurology: # Cerebral Edema  # Concern for Anoxic Brain injury  # Possible Encephalopathy  - High concern for anoxic brain injury given persistently low O2 sats during field CPR and PAO2 of 36 on arrival.   -Neurocrit following, appreciate recs    Cardiovascular / Hemodynamics: # PEA arrest complicated by VF  # Cardiogenic shock with profound vasoplegia and hypotension  # Drug induced cardiac arrest  #ECMO Cannulation (4/1)  #IABP Placement (4/1)  No CAD on coronary angiogram.  Peripheral V-A ECMO inserted for cardiopulmonary support. IABP insert for maximal support.  TTE (4/2): LVEF 5-10% with partially opening AV.   --ECMO: 3600RPM, Flow 4LPM, Sweep 0.5 LPM, FIO2 60%  --Heparin held due to GIB   --hold ACE/ARB for now given likely reduced renal fxn after arrest       Pulmonary: # Acute Hypoxic Respiratory failure  ETT in place at ~2 cm above the lizabeth.    Vent Mode: SIMV/PS  (Synchronized Intermittent Mandatory Ventilation with Pressure Support)  FiO2 (%): 60 %  Resp Rate (Set): 7 breaths/min  Tidal Volume (Set, mL): 500 mL  PEEP (cm H2O): 5 cmH2O  Pressure Support (cm H2O): 5 cmH2O  Resp: 10       GI and Nutrition: # Shock liver secondary to cardiac arrest  No known medical hx.   -- monitor LFTs  -- bowel regimen - on hold for now due to hypothermia  -- GI Prophylaxis: PPI    Renal, Fluid and Electrolytes: #Acute Renal Failure  # Severe lactic acidosis  #Hypernatremia  #Hyperkalemia  Nephrology consulted.   --CRRT initiated (4/3)  --maintain K~3 and Mg>2    Infectious Disease: # Possible aspiration penumonia  --daily blood cultures  --monitor for signs of infection given cooling, lines, and leukocytosis   Hematology and Oncology: # Possible Coagulopathy  Will continue heparin for ECMO to maintain ACT goal.    - currently held due to bleeding  --cryo PRN fibrinogen < 200; FFP for INR >2  --Transfuse for Hgb<10  --heparin gtt for ECMO with ACT goal 180-200   Endocrinology: --HgbA1c 5.3   --On insulin gtt   Lines: L Basilic PICC April 4, 2022  R femoral arterial (17Fr) and venous (25 Fr)ECMO cannulae April 1, 2022  L femoral arterial line (IABP) April 1, 2022  L radial arterial line April 6, 2022  ETT 7.5mm April 1, 2022  Lott catheter April 1, 2022  OG  tube April 1, 2022  Restraint: needed    Current lines are required for patient management       MotherDayana lives in [place]. Contact number: [phone].   Per mother, patient has an aunt, Teri Jiménez,  who lives in [place], contact number: [phone]      The Pt was discussed and evaluated with Dr. Jennifer MD, attending physician, who agrees with the assessment and plan above.     Los Kuhn MD   Cardiology Fellow, PGY-5  April 9, 2022  1:14 PM            Interval Events:   [facility] involved with ongoing patient care for potential organ donation. Patient not breathing over vent with no significant reflexes. Neurocrit evaluated for brain death but patient did not become apneic on pressure support.            Medications:     Current Facility-Administered Medications:      0.9% sodium chloride BOLUS, 1,000 mL, CRRT, Q1H PRN, Kesha Salinas NP     [START ON 4/10/2022] amiodarone (PACERONE) tablet 200 mg, 200 mg, Oral or Feeding Tube, BID, Papito Devi MD     [START ON 4/17/2022] amiodarone (PACERONE) tablet 200 mg, 200 mg, Oral or Feeding Tube, Daily, Papito Devi MD     amiodarone (PACERONE) tablet 400 mg, 400 mg, Oral or Feeding Tube, BID, Papito Devi MD, 400 mg at 04/09/22 0736     artificial tears ophthalmic ointment, , Both Eyes, Q8H PRN, Papito Devi MD, Given at 04/04/22 2333     B and C vitamin Complex with folic acid (NEPHRONEX) liquid 5 mL, 5 mL, Per Feeding Tube, Daily, Feliberto Bucio MD, 5 mL at 04/09/22 0735     calcium gluconate 2 g in 100 mL sodium chloride intermittent infusion (premix), 2 g, Intravenous, Q8H PRN, Kesha Salinas NP, 2 g at 04/09/22 1115     chlorhexidine (PERIDEX) 0.12 % solution 15 mL, 15 mL, Swish & Spit, BID, Feliberto Bucio MD, 15 mL at 04/09/22 0738     dextrose 10% infusion, , Intravenous, Continuous PRN, John Weiner MD, Stopped at 04/04/22 0400     dextrose 5% infusion, , Intravenous, Continuous, Feliberto Bucio MD,  Last Rate: 125 mL/hr at 04/09/22 0658, New Bag at 04/09/22 0658     glucose gel 15-30 g, 15-30 g, Oral, Q15 Min PRN **OR** dextrose 50 % injection 25-50 mL, 25-50 mL, Intravenous, Q15 Min PRN **OR** glucagon injection 1 mg, 1 mg, Subcutaneous, Q15 Min PRN, John Weiner MD     dialysate for CVVHD & CVVHDF (Phoxillum BK4/2.5), 12.5 mL/kg/hr, CRRT, Continuous, Amanda Stahl MD, Last Rate: 1,100 mL/hr at 04/09/22 1217, 12.5 mL/kg/hr at 04/09/22 1217     folic acid (FOLVITE) tablet 1 mg, 1 mg, Per Feeding Tube, Daily, Feliberto Bucio MD, 1 mg at 04/09/22 0737     heparin 2 unit/mL in 0.9% NaCl (for REPERFUSION CATHETER), 3 mL/hr, Intravenous, Continuous, Maira Escalante PA-C, Last Rate: 3 mL/hr at 04/09/22 1200, 3 mL/hr at 04/09/22 1200     heparin ANTICOAGULANT bolus dose from infusion pump 376.5-753 Units, 5-10 Units/kg (Ideal), Other, Q30 Min PRN, Maira Escalante PA-C, 750 Units at 04/08/22 1234     heparin infusion 25,000 units in D5W 250 mL ANTICOAGULANT, 10-50 Units/kg/hr (Ideal), Other, Continuous, Maira Escalante PA-C, Stopped at 04/08/22 1814     heparin lock flush 10 UNIT/ML injection 5-20 mL, 5-20 mL, Intracatheter, Q1H PRN, Feliberto Bucio MD     lactated ringers infusion, 250 mL, Intravenous, 6x Daily PRN, AgdamagBeth MD, Last Rate: 999 mL/hr at 04/02/22 1112, 250 mL at 04/02/22 1112     lidocaine (LMX4) cream, , Topical, Q1H PRN, Silvana Ca APRN CNP     lidocaine 1 % 0.1-1 mL, 0.1-1 mL, Other, Q1H PRN, Silvana Ca APRN CNP     magnesium sulfate 2 g in water intermittent infusion, 2 g, Intravenous, Q8H PRN, Kesha Salinas, NP     midazolam (VERSED) drip - ADULT 100 mg/100 mL in NS (pre-mix), 1-8 mg/hr, Intravenous, Continuous, Feliberto Bucio MD, Stopped at 04/08/22 2139     niCARdipine (CARDENE) 100 mg in sodium chloride in non-PVC bag 0.9 % 250 mL infusion, 0.5-15 mg/hr, Intravenous, Continuous, Feliberto Bucio MD, Stopped at 04/08/22 2146     No heparin  required, , Does not apply, Continuous PRN, Amanda Stahl MD     norepinephrine (LEVOPHED) 16 mg in  mL infusion MAX CONC CENTRAL LINE, 0.01-0.6 mcg/kg/min (Dosing Weight), Intravenous, Continuous, Eloy Vallejo MD, Last Rate: 1.7 mL/hr at 04/09/22 1200, 0.02 mcg/kg/min at 04/09/22 1200     pantoprazole (PROTONIX) 2 mg/mL suspension 40 mg, 40 mg, Oral or Feeding Tube, QAM AC, Papito Devi MD, 40 mg at 04/09/22 0735     piperacillin-tazobactam (ZOSYN) 3.375 g vial to attach to  mL bag, 3.375 g, Intravenous, Q6H, Los Kuhn MD, 3.375 g at 04/09/22 1215     POST-filter replacement solution for CVVHD & CVVHDF (Phoxillum BK4/2.5), , CRRT, Continuous, Amanda Stahl MD, Last Rate: 200 mL/hr at 04/09/22 0810, New Bag at 04/09/22 0810     potassium chloride 20 mEq in 50 mL intermittent infusion, 20 mEq, Intravenous, Q8H PRN, Kesha Salinas NP     PRE-filter replacement solution for CVVHD & CVVHDF (Phoxillum BK4/2.5), 12.5 mL/kg/hr, CRRT, Continuous, Amanda Stahl MD, Last Rate: 1,100 mL/hr at 04/09/22 1218, 12.5 mL/kg/hr at 04/09/22 1218     protein modular (PROSOURCE TF) 2 packet, 2 packet, Per Feeding Tube, 4x Daily, Feliberto Bucio MD, 2 packet at 04/09/22 1213     sodium chloride (PF) 0.9% PF flush 3 mL, 3 mL, Intracatheter, Q8H PRN, Silvana Ca APRN CNP, 3 mL at 04/04/22 2355     sodium chloride (PF) 0.9% PF flush 3 mL, 3 mL, Intracatheter, q1 min prn, Silvana Ca, APRN CNP     sodium phosphate 15 mmol in D5W intermittent infusion, 15 mmol, Intravenous, Q8H PRN, Kesha Salinas NP     technecium exametazine radioisotope injection (TC-99M CERETEC Brain Perfusion) radioisotope injection 16-24 mCi, 16-24 mCi, Intravenous, Once, Robert Rodriguez MD     thiamine (B-1) tablet 100 mg, 100 mg, Per Feeding Tube, Daily, Feliberto Bucio MD, 100 mg at 04/09/22 0737         Review of Systems:   Unable to obtain ROS due to pt's obtunded status             "Physical Exam:   Vital signs:  Temp: 97.7  F (36.5  C) Temp src: Esophageal   Pulse: 83   Resp: 10 SpO2: 98 % O2 Device: Mechanical Ventilator   Height: 180.3 cm (5' 11\") Weight: 87 kg (191 lb 12.8 oz)  Estimated body mass index is 26.75 kg/m  as calculated from the following:    Height as of this encounter: 1.803 m (5' 11\").    Weight as of this encounter: 87 kg (191 lb 12.8 oz).     GENERAL: intubated and sedated  HEENT: ET tube in place  CV: S1/S2 heard with IABP murmur  RESPIRATORY: distant breath sounds  GI: obese, soft, no bowel sounds on asucultation   EXTREMITIES: 1+ peripheral edema. 2+ bilateral pedal pulses. ECMO cannulated  NEUROLOGIC: not oriented to place or time, does not follow commands  MUSCULOSKELETAL: No joint swelling or tenderness.   SKIN: No jaundice. No acute rashes or lesions.             Data:   Labs pending    Recent Results (from the past 24 hour(s))   US Abdominal Aorta Imaging    Narrative    ULTRASOUND ABDOMINAL AORTA 4/8/2022 4:44 PM    CLINICAL HISTORY: Per [employer] for organ donation.     COMPARISONS: CT chest, abdomen, and pelvis 4/1/2022.    REFERRING PROVIDER: DONNA GALLARDO    TECHNIQUE: Aorta and iliac arteries evaluated with grayscale imaging.  Cine clip saved in the patient's record.    FINDINGS:   Aorta:       Proximal: 2.2 cm       Mid: 2.6 cm       Distal: 1.9 cm    Right common iliac artery: 1.2 cm    Left common iliac artery: 1.3 cm.    Aortic balloon pump in place.      Impression    IMPRESSION:  1. Ectatic mid aorta measures 2.6 cm. Aortic balloon pump in place.    Aorta diameter measurement classification:  < 2.5 cm: Normal  2.5 - 2.9 cm: Ectatic  > 3 cm: Aneurysmal    Iliac artery:  Males: > or = 1.7 cm: Ectatic  Females: > or = 1.5 cm: Ectatic  > 2.5 cm: Aneurysmal    PEG FISHER MD         SYSTEM ID:  [id number]   US Abdomen Complete    Narrative    EXAMINATION: US ABDOMEN COMPLETE  4/8/2022 7:35 PM      CLINICAL HISTORY: [employer] requires for transplant work " up    COMPARISON: Ultrasound for 4/8/22    FINDINGS:  The liver is normal in contour and echogenicity. There is no  intrahepatic or extrahepatic biliary ductal dilatation. The common  bile duct measures 3.2 mm. The gallbladder is normal, without  gallstones or pericholecystic fluid. Decompressed gallbladder with  mildly thickened wall secondary to degree of distention. Patient not  fasting for this study.    The spleen measures maximally 8.3 cm and is normal in appearance. The  visualized portions of the pancreas is largely obscured by overlying  bowel gas.    The visualized upper abdominal aorta and inferior vena cava are  normal.      The kidneys are normal in position and echogenicity. The right kidney  measures 10.2 cm and the left kidney measures 10.1 cm. There is no  significant urinary tract dilation      Impression    IMPRESSION:   Normal abdominal ultrasound.    I have personally reviewed the examination and initial interpretation  and I agree with the findings.    REGIS ORLANDO MD         SYSTEM ID:  [id number]

## 2022-04-09 NOTE — PLAN OF CARE
Assessment:   Cardiac: SR/ST. Nicardipine gtt changed to Levophed gtt to maintain MAP > 65, both currently off. IABP 1:1 via EKG. A/V ECMO, heparin gtt off.   Resp: SIMV 7/500/5/5 60%.   Neuro:  Versed gtt turned off per neuro crit recommendations. Pupils fixed. No cough, cornea reflex, gag, or response to pain.  : Anuric, Ongoing CRRT, unable to pull any fluid due to hemodynamic instability.   GI: OG to LIS. Rectal tube in place.   Endocrine: No supplemental insulin required.     Interventions: See previous note for events and interventions done during shift. 2 unit of RBCs given.    Plan: Will continue to monitor/assess and update MD as needed.

## 2022-04-09 NOTE — PLAN OF CARE
See flowsheets for Neuro exams. SR with rates 70-80s. VSS. IABP 1:1 100%. ECMO flows 4 Sw-2 Fi02 60%. 1 PRBC given. SIMV settings unchanged. Lungs coarse/diminished. CRRT as ordered. See flowsheets. Blood from OG with suction. OG clamped to time. No rectal tube output to time.  Family updated at bedside. OR time scheduled for 0900 tomorrow.       Around 1830 patient poured out 1.5L of blood out of rectal tube. [facility] notified. [facility] notified.

## 2022-04-09 NOTE — PROGRESS NOTES
CRRT STATUS NOTE    DATA:  Time:  5:25 PM  Pressures WNL:  Yes  Filter Status:  WDL    Problems Reported/Alarms Noted:  None    Supplies Present:  Yes    ASSESSMENT:  Patient Net Fluid Balance:  Net IO Since Admission: 8,335.16 mL [04/09/22 1725]     Intake/Output Summary (Last 24 hours) at 4/9/2022 1725  Last data filed at 4/9/2022 1700  Gross per 24 hour   Intake 5342.27 ml   Output 3967 ml   Net 1375.27 ml      Vitals:  Temp:  [97.5  F (36.4  C)-98.6  F (37  C)] 97.9  F (36.6  C)  Pulse:  [] 79  Resp:  [6-22] 9  MAP:  [60 mmHg-94 mmHg] 73 mmHg  Arterial Line BP: ()/(44-73) 90/56  FiO2 (%):  [60 %] 60 %  SpO2:  [92 %-100 %] 97 %   Labs:    Lab Results   Component Value Date     04/09/2022    POTASSIUM 3.6 04/09/2022    CR 2.74 (H) 04/09/2022    BUN 52 (H) 04/09/2022    MAG 2.4 (H) 04/09/2022    PHOS 5.4 (H) 04/09/2022    WBC 16.8 (H) 04/09/2022    HGB 7.8 (L) 04/09/2022    HCT 24.1 (L) 04/09/2022    PLT 87 (L) 04/09/2022     Goals of Therapy:  goal net neg 1-2L/24h    INTERVENTIONS:   Review flow sheets and discuss plan of care with bedside nurse and nephrology team.    PLAN:  Continue fluid removal as tolerated per goals of therapy.  Check filter daily and change filter q 72 hrs and PRN.  Please contact the CRRT resource RN at [phone] with any questions/concerns.

## 2022-04-09 NOTE — PROGRESS NOTES
ECMO Attending Progress Note  2022    Michael Callejas is a 36 year old male who was cannulated for ECMO 2022 due to refractory PEA cardiac arrest with intermittent VF requiring 4 shocks.    Cannulation Site:  17 Fr in the R femoral artery  25 Fr in the R femoral vein    Interval events: stable pulsatility, family considering withdrawal of care    Pulsatilty (IABP paused if applicable):50 mmHG     Physical Exam:  Temp:  [98.1  F (36.7  C)-98.6  F (37  C)] 98.1  F (36.7  C)  Pulse:  [] 103  Resp:  [6-26] 7  MAP:  [67 mmHg-278 mmHg] 68 mmHg  Arterial Line BP: ()/() 85/50  FiO2 (%):  [60 %] 60 %  SpO2:  [92 %-100 %] 92 %    Intake/Output Summary (Last 24 hours) at 2022  Last data filed at 2022 2200  Gross per 24 hour   Intake 5392.97 ml   Output 6237 ml   Net -844.03 ml      Vent Mode: SIMV/PS  (Synchronized Intermittent Mandatory Ventilation with Pressure Support)  FiO2 (%): 60 %  Resp Rate (Set): (S) 7 breaths/min  Tidal Volume (Set, mL): 500 mL  PEEP (cm H2O): 5 cmH2O  Pressure Support (cm H2O): 5 cmH2O  Resp: (!) 7       Labs:  Recent Labs   Lab 22  1733 22  1604   PH 7.21* 7.42 7.45 7.42   PCO2 56* 31* 30* 32*   PO2 96 88 139* 127*   HCO3 23 20* 21 21   O2PER 60 60 60 60  60  60      Recent Labs   Lab 22  1603 22  1105 22  0347   WBC 20.8* 18.3* 17.9* 17.9*   HGB 7.4* 6.9* 7.1* 7.5*     Creatinine   Date Value Ref Range Status   2022 2.93 (H) 0.66 - 1.25 mg/dL Final   2022 2.84 (H) 0.66 - 1.25 mg/dL Final   2022 2.95 (H) 0.66 - 1.25 mg/dL Final   2022 2.97 (H) 0.66 - 1.25 mg/dL Final   2022 2.97 (H) 0.66 - 1.25 mg/dL Final   Blood Flow (Circuit) LPM: 3.87 LPM  Gas Flow  LPM: 0.5 LPM  Gas FiO2   %: 60 %  ACT  (seconds): 139 seconds (MD notified- no new orders.)  Blood Temp  (degrees C): 36.7 C  Pulse Oximetry  (SpO2%): 93 %  Arterial Pressure  mmH mmHg    ECMO  Issues including assessments and plan on DOS 4/8/2022:  Neuro: Sedated for mechanical ventilation and ECMO.  No acute distress.  NIRS stable b/l  RASS goal: -3  CV: Cardiogenic shock.  Hemodynamically stable on no pressors  Pulm: Keep vent settings at rest settings as above.  FEN/Renal: CRRT  Heme: ACT goal: 180-200, Hemoglobin stable.  Minimal oozing around the ECMO cannulas.  ID: Receiving empiric antibiotics  Cannulae: Position is acceptable on exam and the available imaging.  Distal perfusion cannula is in place and patent.  Extremities are well-perfused.     I have personally reviewed the ECMO flows, oxygenation and CO2 clearance, anticoagulation, and cannula position.  I have also personally assessed the patient's systemic response with hemodynamics, oxygenation, ventilation, and bleeding.       The patient requires continued ECMO support and management in the ICU.  I have discussed patient care and treatment plan with the primary team.      Robert Rodriguez MD, PhD  Interventional/Critical Care Cardiology  [phone]    April 8, 2022

## 2022-04-10 NOTE — PROGRESS NOTES
Care Management Follow Up    Length of Stay (days): 9    Expected Discharge Date:  n/a     Concerns to be Addressed: burial assistance  Patient plan of care discussed at interdisciplinary rounds: No, weekend    Additional Information:   DAT paged by bedside RN regarding family questions about cremation resources.  DAT called bedside RN who connected SW to mother Dayana via phone.  DAT provided brief supportive counseling regarding Jasmin' loss, and Dayana asked that SW call her son Lawson regarding some questions they have about the cost of cremation.  Dayana states they are native to [facility], however she states son Michael was not registered with the [place].  Number for son Lawson provided ([phone]).    DAT spoke to son Lawson and offered condolences for loss of brother.  Lawson states that an Uncle of his provided him with some state statutes for assistance with cremation.  DAT explained to Lawson that family can look into [facility]-based burial assistance programs and that it is likely that the state statutes he mentioned are connected to these burial assistance programs.  DAT offered to email Lawson further information to apply for [facility] Burial assistance.  Lawson did confirm that he has the list of cremation sites previously sent by DAT.  Chuck explained that he and mother Dayana spent all of their money coming down to [place] from [place] and do not have additional funds to pay for cremation.      DAT emailed Lawson information for [facility] Burial Assistance program to Lawson ([email or web address]).  Lawson states he and Dayana will review these resources further and reach back out to SW team if additional questions arise.       DAT pager number provided to family ([phone]).      DAT team available for additional support, if needed, surrounding burial assistance and  home selection.     JUANJO Ibrahim, LICSW  Weekend Adult Acute Care     [facility] on [facility]  - search SOCIAL WORK - for text paging options    4A, 4C, 4E, 5A and 5B; pager [phone]  6A, 6B, 6C and 6D; pager [phone]  7A, 7B, 7C, 7D and 5C; pager [phone]  [facility] pager: [phone]     RNCC/Care Coordinator; job code [id number] or [phone]    -For hours 1600 - midnight Pager: [phone]    ARCHANA HumphriesSW

## 2022-04-10 NOTE — PROGRESS NOTES
ECMO Attending Progress Note  2022    Michael Callejas is a 36 year old male who was cannulated for ECMO 2022 due to refractory PEA cardiac arrest with intermittent VF requiring 4 shocks.    Cannulation Site:  17 Fr in the R femoral artery  25 Fr in the R femoral vein    Interval events: Family determined to move toward comfort care with DCD organ donation    Pulsatilty (IABP paused if applicable):50 mmHG     Physical Exam:  Temp:  [97.5  F (36.4  C)-98.6  F (37  C)] 97.9  F (36.6  C)  Pulse:  [] 80  Resp:  [6-18] 7  MAP:  [60 mmHg-100 mmHg] 76 mmHg  Arterial Line BP: ()/(44-77) 91/59  FiO2 (%):  [60 %] 60 %  SpO2:  [92 %-100 %] 96 %    Intake/Output Summary (Last 24 hours) at 2022 1914  Last data filed at 2022 1900  Gross per 24 hour   Intake 4882.47 ml   Output 5164 ml   Net -281.53 ml      Vent Mode: SIMV/PS  (Synchronized Intermittent Mandatory Ventilation with Pressure Support)  FiO2 (%): 60 %  Resp Rate (Set): 7 breaths/min  Tidal Volume (Set, mL): 500 mL  PEEP (cm H2O): 5 cmH2O  Pressure Support (cm H2O): 5 cmH2O  Resp: (!) 7       Labs:  Recent Labs   Lab 22  1748 22  1605 22  1345 22  1202   PH 7.38 7.40 7.41 7.38   PCO2 39 38 38 39   PO2 102 161* 148* 160*   HCO3 23 24 24 23   O2PER 60 60  60  60 60 60      Recent Labs   Lab 22  1604 22  1203 22  1009 22  0333   WBC 16.8* 18.2* 17.1* 18.0*   HGB 7.8* 7.6* 7.3* 7.0*     Creatinine   Date Value Ref Range Status   2022 2.62 (H) 0.66 - 1.25 mg/dL Final   2022 2.74 (H) 0.66 - 1.25 mg/dL Final   2022 2.69 (H) 0.66 - 1.25 mg/dL Final   2022 2.81 (H) 0.66 - 1.25 mg/dL Final   Blood Flow (Circuit) LPM: 4.01 LPM  Gas Flow  LPM: 2 LPM  Gas FiO2   %: 60 %  ACT  (seconds): 147 seconds  Blood Temp  (degrees C): 36.7 C  Pulse Oximetry  (SpO2%): 95 %  Arterial Pressure  mmH mmHg    ECMO Issues including assessments and plan on DOS 2022:  Neuro: Sedated for  mechanical ventilation and ECMO.  No acute distress.  NIRS stable b/l  RASS goal: -3  CV: Cardiogenic shock.  Hemodynamically stable on no pressors  Pulm: Keep vent settings at rest settings as above.  FEN/Renal: CRRT  Heme: ACT goal: 180-200, Hemoglobin stable.  Minimal oozing around the ECMO cannulas.  ID: Receiving empiric antibiotics  Cannulae: Position is acceptable on exam and the available imaging.  Distal perfusion cannula is in place and patent.  Extremities are well-perfused.     I have personally reviewed the ECMO flows, oxygenation and CO2 clearance, anticoagulation, and cannula position.  I have also personally assessed the patient's systemic response with hemodynamics, oxygenation, ventilation, and bleeding.       The patient requires continued ECMO support and management in the ICU.  I have discussed patient care and treatment plan with the primary team.      Robert Rodriguez MD, PhD  Interventional/Critical Care Cardiology  [phone]    April 9, 2022

## 2022-04-10 NOTE — PROGRESS NOTES
Nephrology Chart Review 4/10/22     Patient will be going to OR at 0900 for organ donation. He will continue CRRT until that time and then discontinue.   Nephrology will sign off    Kesha Robles CNP  Nephrology

## 2022-04-10 NOTE — PROGRESS NOTES
ECMO Shift Summary: 1900-0700      ECMO Equipment:  Console serial number: [id number]  Circuit Lot number: [id number]  Oxygenator Lot number: [id number]      Patient remains on [facility] ECMO, all equipment is functioning and alarms are appropriately set. RPM's 3560 with flow range 3.86-4.04 L/min. Sweep gas is at 2 LPM and FiO2 60%. Circuit remains free of air, Fibrin located post-oxygenator and at connectors. Cannulas are secure with no bleeding from site. Extremities are warm.     Significant Shift Events: None.    Vent settings:  Vent Mode: SIMV/PS  (Synchronized Intermittent Mandatory Ventilation with Pressure Support)  FiO2 (%): 60 %  Resp Rate (Set): 7 breaths/min  Tidal Volume (Set, mL): 500 mL  PEEP (cm H2O): 5 cmH2O  Pressure Support (cm H2O): 5 cmH2O  Resp: (!) 7  .    Heparin is off. ACT range 143-152.    Patient on CRRT, blood loss was orally, nasally, rectally, ETT and OG. Product given included PRBC x3.       Intake/Output Summary (Last 24 hours) at 4/10/2022 0549  Last data filed at 4/10/2022 0500  Gross per 24 hour   Intake 4745.9 ml   Output 4516 ml   Net 229.9 ml       ECHO:  No results found for this or any previous visit.  No results found for this or any previous visit.      CXR:  No results found for this or any previous visit (from the past 24 hour(s)).    Labs:  Recent Labs   Lab 04/10/22  0420 04/10/22  0155 04/09/22  2351 04/09/22  2159   PH 7.39 7.38 7.38 7.37   PCO2 39 40 40 41   PO2 132* 139* 111* 118*   HCO3 24 24 24 24   O2PER 60  60  60 60 60 60       Lab Results   Component Value Date    HGB 8.8 (L) 04/10/2022    PHGB 30 (H) 04/10/2022    PLT 81 (L) 04/10/2022    FIBR 291 04/10/2022    INR 1.21 (H) 04/10/2022    PTT 39 (H) 04/10/2022    DD >20.00 (HH) 04/10/2022    ANTCH 68 (L) 04/09/2022         Plan is OR today at 0900.      Sammi Corcoran, RRT-NPS  ECMO Specialist  4/10/2022 5:49 AM

## 2022-04-10 NOTE — DISCHARGE SUMMARY
[address] [address]  p: [phone]    Death Summary: Cardiology Service    Michael Callejas MRN# [id number]   YOB: 1985 Age: 36 year old       Admission Date: 4/1/2022  Discharge Date: 04/10/22    Discharge Diagnoses:   # Cerebral Edema  # Concern for Anoxic Brain injury  # Possible Encephalopathy  # PEA arrest complicated by VF  # Cardiogenic shock with profound vasoplegia and hypotension  # Drug induced cardiac arrest  #ECMO Cannulation (4/1)  #IABP Placement (4/1)  # Acute Hypoxic Respiratory failure    #Shock liver secondary to cardiac arrest  #Acute Renal Failure  # Severe lactic acidosis  #Hypernatremia  #Hyperkalemia  #Thrombocytopenia                 Brief HPI:  Angie Caruso is a 36 year old adult male who was admitted on 4/1/2022. Patient was brought by EMS who gave the following report. EMS was called at 12:54 pm to the patient's residence where he had suffered a presumed cardiac arrest after taking methamphetamine and DMT. Per EMS, CPR was administered by room mates. Unclear how long down time prior TO room mates administered CPR or to EMS call. On arrival, EMS noted patient to be in PEA arrest with CPR continued and patient was given total 4 doses of Epi, once of bicarb, one of calcium and 3 doses of Narcan (one ?inhaled, 2 IV). He was also shocked 4 times by EMS for periods of VFib electrical activity enroute to the hospital. Of note, ROSC was never achieved by EMS. On presentation to the Cath lab, patient was in asystole with DENI delivered CPRS continued. Initial blood gas showed a pH of 6.7, O2 of 36. He was cannulated for VA ECMO at 2:08 pm. Post cannulation patient was alternating between PEA and VF with additional 3 total 200 J shocks delivered. Post cannulation coronary angiogram showed clean coronary arteries. Epi, Norepi, Vaso and Phenylephrine was started at high doses and patient was given  multiple boluses of Amiodarone. Additionally, an intra-aortic balloon pump and thermogard was placed with patient transported to CT for brain imaging prior to arriving to the ICU for continued management.       Hospital Course by Diagnosis:  Family made decision to withdraw support and agreed for [facility] to evaluate for organ donation. Pt was taken down to OR in the AM. Support was withdrawn and pt passed peacefully at 11:04 hours.     Neurology: # Cerebral Edema  # Concern for Anoxic Brain injury  # Possible Encephalopathy  - High concern for anoxic brain injury given persistently low O2 sats during field CPR and PAO2 of 36 on arrival.   -Neurocrit following, appreciate recs   Cardiovascular / Hemodynamics: # PEA arrest complicated by VF  # Cardiogenic shock with profound vasoplegia and hypotension  # Drug induced cardiac arrest  #ECMO Cannulation (4/1)  #IABP Placement (4/1)  No CAD on coronary angiogram.  Peripheral V-A ECMO inserted for cardiopulmonary support. IABP insert for maximal support.  TTE (4/2): LVEF 5-10% with partially opening AV.   --ECMO: 3600RPM, Flow 4LPM, Sweep 0.5 LPM, FIO2 60%  --Heparin held due to GIB   --hold ACE/ARB for now given likely reduced renal fxn after arrest         Pulmonary: # Acute Hypoxic Respiratory failure  ETT in place at ~2 cm above the lizabeth.    Vent Mode: SIMV/PS  (Synchronized Intermittent Mandatory Ventilation with Pressure Support)  FiO2 (%): 60 %  Resp Rate (Set): 7 breaths/min  Tidal Volume (Set, mL): 500 mL  PEEP (cm H2O): 5 cmH2O  Pressure Support (cm H2O): 5 cmH2O  Resp: 10         GI and Nutrition: # Shock liver secondary to cardiac arrest  No known medical hx.   -- monitor LFTs  -- bowel regimen - on hold for now due to hypothermia  -- GI Prophylaxis: PPI    Renal, Fluid and Electrolytes: #Acute Renal Failure  # Severe lactic acidosis  #Hypernatremia  #Hyperkalemia  Nephrology consulted.   --CRRT initiated (4/3)  --maintain K~3 and Mg>2    Infectious  Disease: # Possible aspiration penumonia  --daily blood cultures  --monitor for signs of infection given cooling, lines, and leukocytosis   Hematology and Oncology: # Possible Coagulopathy  Will continue heparin for ECMO to maintain ACT goal.    - currently held due to bleeding  --cryo PRN fibrinogen < 200; FFP for INR >2  --Transfuse for Hgb<10  --heparin gtt for ECMO with ACT goal 180-200   Endocrinology: --HgbA1c 5.3   --On insulin gtt   Lines: L Basilic PICC April 4, 2022  R femoral arterial (17Fr) and venous (25 Fr)ECMO cannulae April 1, 2022  L femoral arterial line (IABP) April 1, 2022  L radial arterial line April 6, 2022  ETT 7.5mm April 1, 2022  Lott catheter April 1, 2022  OG tube April 1, 2022  Restraint: needed    Current lines are required for patient management         Pertinent Procedures:  ECMO Cannulation    Consults:  Neurology  Nephrology  Palliative Care      Condition on discharge  Temp:  [97.5  F (36.4  C)-98.1  F (36.7  C)] 97.9  F (36.6  C)  Pulse:  [32-89] 32  Resp:  [7-10] 10  MAP:  [22 mmHg-100 mmHg] 22 mmHg  Arterial Line BP: ()/(22-77) 24/22  FiO2 (%):  [60 %] 60 %  SpO2:  [3 %-99 %] 78 %  GENERAL: intubated and sedated  HEENT: ET tube in place  CV: S1/S2 heard with IABP murmur  RESPIRATORY: distant breath sounds  GI: obese, soft, no bowel sounds on asucultation   EXTREMITIES: 1+ peripheral edema. 2+ bilateral pedal pulses. ECMO cannulated  NEUROLOGIC: not oriented to place or time, does not follow commands  MUSCULOSKELETAL: No joint swelling or tenderness.   SKIN: No jaundice. No acute rashes or lesions.       Recent Labs   Lab 04/10/22  0909 04/10/22  0908 04/10/22  0759 04/10/22  0658 04/10/22  0553 04/10/22  0550 04/10/22  0420 04/10/22  0417 04/09/22  2159 04/09/22  2158 04/09/22  1605 04/09/22  1604 04/09/22  1208 04/09/22  1203 04/09/22  0335 04/09/22  0333   NA  --  139  --   --   --  140  --  139  --  140  140  140   < > 141   < > 142   < > 143  143   POTASSIUM  --   3.8  --   --   --  3.6  --  3.5  --  3.7  3.7  3.7   < > 3.6   < > 3.7   < > 4.2  4.2   CHLORIDE  --  108  --   --   --  108  --  108  --  109  109  109   < > 109   < > 110*   < > 114*  114*   CO2  --  23  --   --   --  24  --  23  --  24  24  24   < > 23   < > 23   < > 23  23   ANIONGAP  --  8  --   --   --  8  --  8  --  7  7  7   < > 9   < > 9   < > 6  6   * 107* 99 95   < > 99   < > 95   < > 84  84  84   < > 77   < > 96   < > 119*  119*   BUN  --  36*  --   --   --  39*  --  41*  --  47*  47*  47*   < > 52*   < > 56*   < > 64*  64*   CR  --  2.67*  --   --   --  2.55*  --  2.47*  --  2.62*  2.62*  2.62*   < > 2.74*   < > 2.81*   < > 2.76*  2.76*   GFRESTIMATED  --  31*  --   --   --  33*  --  34*  --  32*  32*  32*   < > 30*   < > 29*   < > 30*  30*   SAMIR  --  7.4*  --   --   --  7.7*  --  7.4*  --  7.4*  7.4*  7.4*   < > 7.3*   < > 7.7*   < > 7.0*  7.0*   MAG  --  2.3  --   --   --   --   --  2.3  --  2.3  --  2.4*  --  2.4*   < > 2.3   PHOS  --   --   --   --   --   --   --  5.5*  --  5.2*  --   --   --  5.4*  --  7.0*    < > = values in this interval not displayed.              Patient Care Team:  No Ref-Primary, Physician as PCP - General  Feliberto Bucio MD  Cardiology Fellow

## 2022-04-10 NOTE — DEATH PRONOUNCEMENT
MD DEATH PRONOUNCEMENT    Called to pronounce Michael Callejas dead.    Physical Exam: Unresponsive to noxious stimuli, Spontaneous respirations absent, Breath sounds absent, Carotid pulse absent and Heart sounds absent    Patient was pronounced dead at 11:04 AM, April 10, 2022.    Preliminary Cause of Death: Cardiac Arrest    Active Problems:    Cardiac arrest (H)    Acute kidney injury (H)    Acute kidney failure with tubular necrosis (H)       Infectious disease present?: NO    Communicable disease present? (examples: HIV, chicken pox, TB, Ebola, CJD) :  NO    Multi-drug resistant organism present? (example: MRSA): NO    Please consider an autopsy if any of the following exist:  NO Unexpected or unexplained death during or following any dental, medical, or surgical diagnostic treatment procedures.   NO Death of mother at or up to seven days after delivery.     NO All  and pediatric deaths.     NO Death where the cause is sufficiently obscure to delay completion of the death certificate.   NO Deaths in which autopsy would confirm a suspected illness/condition that would affect surviving family members or recipients of transplanted organs.     The following deaths must be reported to the 's Office:  NO A death that may be due entirely or in part to any factors other than natural disease (recent surgery, recent trauma, suspected abuse/neglect).   NO A death that may be an accident, suicide, or homicide.     NO Any sudden, unexpected death in which there is no prior history of significant heart disease or any other condition associated with sudden death.   NO A death under suspicious, unusual, or unexpected circumstances.    NO Any death which is apparently due to natural causes but in which the  does not have a personal physician familiar with the patient s medical history, social, or environmental situation or the circumstances of the terminal event.   NO Any death apparently due to Sudden  Infant Death Syndrome.     NO Deaths that occur during, in association with, or as consequences of a diagnostic, therapeutic, or anesthetic procedure.   NO Any death in which a fracture of a major bone has occurred within the past (6) six months.   NO A death of persons note seen by their physician within 120 days of demise.     NO Any death in which the  was an inmate of a public institution or was in the custody of Law Enforcement personnel.   NO  All unexpected deaths of children   YES Solid organ donors   NO Unidentified bodies   NO Deaths of persons whose bodies are to be cremated or otherwise disposed of so that the bodies will later be unavailable for examination;   NO Deaths unattended by a physician outside of a licensed healthcare facility or licensed residential hospice program   NO Deaths occurring within 24 hours of arrival to a health care facility if death is unexpected.    NO Deaths associated with the decedent s employment.   NO Deaths attributed to acts of terrorism.   NO Any death in which there is uncertainty as to whether it is a medical examiner s care should be discussed with the medical investigator.        Body disposition: Body released to the morgue.    Feliberto Bucio MD  Cardiology Fellow

## 2022-04-10 NOTE — PROGRESS NOTES
progCRRT STATUS NOTE    DATA:  Time:  0700    Pressures WNL:  YES    Filter Status:  WDL    Problems Reported/Alarms Noted:  None    Supplies Present:  YES    ASSESSMENT:  Patient Net Fluid Balance:    04/09/2022 I/O= -35.25cc    Vital Signs 0400:  T=97.7 (esophageal), HR=76, RR=7, BP=87/51 (MAP=69), SPO2=96%.    Levophed gtt has remained off since 1300 yesterday.    Labs:    Labs monitored and reviewed.    ROUTINE ICU LABS (Last four results)  CMPRecent Labs   Lab 04/10/22  0658 04/10/22  0553 04/10/22  0550 04/10/22  0420 04/10/22  0417 04/09/22  2159 04/09/22  2158 04/09/22  1751 04/09/22  1748 04/09/22  1605 04/09/22  1604 04/09/22  1208 04/09/22  1203 04/09/22  1009 04/09/22  0335 04/09/22  0333   NA  --   --  140  --  139  --  140  140  140  --  140  --  141   < > 142 142   < > 143  143   POTASSIUM  --   --  3.6  --  3.5  --  3.7  3.7  3.7  --  3.6  --  3.6   < > 3.7 3.8   < > 4.2  4.2   CHLORIDE  --   --  108  --  108  --  109  109  109  --  109  --  109   < > 110* 111*   < > 114*  114*   CO2  --   --  24  --  23  --  24  24  24  --  24  --  23   < > 23 23   < > 23  23   ANIONGAP  --   --  8  --  8  --  7  7  7  --  7  --  9   < > 9 8   < > 6  6   GLC 95 95 99 95 95   < > 84  84  84   < > 84   < > 77   < > 96 94  91   < > 119*  119*   BUN  --   --  39*  --  41*  --  47*  47*  47*  --  50*  --  52*   < > 56* 55*   < > 64*  64*   CR  --   --  2.55*  --  2.47*  --  2.62*  2.62*  2.62*  --  2.62*  --  2.74*   < > 2.81* 2.75*   < > 2.76*  2.76*   GFRESTIMATED  --   --  33*  --  34*  --  32*  32*  32*  --  32*  --  30*   < > 29* 30*   < > 30*  30*   SAMIR  --   --  7.7*  --  7.4*  --  7.4*  7.4*  7.4*  --  7.3*  --  7.3*   < > 7.7* 7.3*   < > 7.0*  7.0*   MAG  --   --   --   --  2.3  --  2.3  --   --   --  2.4*  --  2.4* 2.4*  --  2.3   PHOS  --   --   --   --  5.5*  --  5.2*  --   --   --   --   --  5.4*  --   --  7.0*   PROTTOTAL  --   --   --   --  4.4*  --  4.9*  --   --   --  4.8*   --   --  4.8*  --  4.8*   ALBUMIN  --   --   --   --  1.5*  --  1.7*  1.7*  --   --   --  1.7*  --  1.7* 1.6*  --  1.8*   BILITOTAL  --   --   --   --  1.2  --  0.8  --   --   --  0.7  --   --  1.3  --  0.7   ALKPHOS  --   --   --   --  118  --  129  --   --   --  126  --   --  122  --  124   AST  --   --   --   --  95*  --  108*  --   --   --  109*  --   --  113*  --  130*   ALT  --   --   --   --  163*  --  187*  --   --   --  193*  --   --  200*  --  207*    < > = values in this interval not displayed.     CBC  Recent Labs   Lab 04/10/22  0417 04/10/22  0041 04/09/22 2158 04/09/22  1604 04/09/22  1203   WBC 16.8*  --  18.3* 16.8* 18.2*   RBC 2.91*  --  2.74* 2.50* 2.35*   HGB 8.8* 7.6* 8.5* 7.8* 7.6*   HCT 27.5*  --  26.3* 24.1* 23.1*   MCV 95  --  96 96 98   MCH 30.2  --  31.0 31.2 32.3   MCHC 32.0  --  32.3 32.4 32.9   RDW 17.7*  --  17.2* 16.1* 15.9*   PLT 81*  --  85* 87* 86*     INR  Recent Labs   Lab 04/10/22  0417 04/09/22  2158 04/09/22  1604 04/09/22  1009   INR 1.21* 1.14 1.15 1.13     Arterial Blood Gas  Recent Labs   Lab 04/10/22  0550 04/10/22  0420 04/10/22  0155 04/09/22  2351   PH 7.35 7.39 7.38 7.38   PCO2 44 39 40 40   PO2 108* 132* 139* 111*   HCO3 24 24 24 24   O2PER 60 60  60  60 60 60     0420 ICa=4.3, LA=0.9    Goals of Therapy:    Net negative 1-2 liters/24 hrs    INTERVENTIONS:   None    PLAN:  Continue to monitor.  Change CRRT set q72 hrs and PRN.  Call CRRT RN at [phone] with questions.  See flowsheets for details.

## 2022-04-10 NOTE — CARE PLAN
Goal Outcome Evaluation:    Major Shift Events:  Pt down to OR for withdraw life support. Family able to say goodbye prior to getting on elevator. Pt prepared for surgery by surgery team. 26,000 units heparin given.  Pt extubated, ecmo cannulas clamped, IABP stopped.     Plan: DCD. Support family.

## 2022-04-10 NOTE — PLAN OF CARE
Assessment:   Cardiac: SR. IABP 1:1 via EKG. A/V ECMO, heparin gtt off.   Resp: SIMV 7/500/5/5 60%.   Neuro:  Pupils fixed. No cough, cornea reflex, gag, or response to pain.  : Anuric, Ongoing CRRT, unable to pull any fluid due to hemodynamic instability.   GI: OG to LIS. Rectal tube in place.   Endocrine: Requiring D10 gtt to maintain blood sugar.      Interventions: 3 unit of RBCs given.     Plan: Will continue to monitor/assess and update MD as needed. DCD donation planned for 0900.

## 2022-04-10 NOTE — PROGRESS NOTES
ECLS Discontinuation Note:    ECLS was discontinued at 1035 on 4/10/2022    Issac Linder RT  ECMO Specialist  4/10/2022 1:57 PM

## 2022-04-11 LAB
ATRIAL RATE - MUSE: 90 BPM
BACTERIA SPT CULT: ABNORMAL
BACTERIA SPT CULT: ABNORMAL
BACTERIA UR CULT: NO GROWTH
BACTERIA UR CULT: NO GROWTH
DIASTOLIC BLOOD PRESSURE - MUSE: NORMAL MMHG
GRAM STAIN RESULT: ABNORMAL
HBV SURFACE AG SERPL QL IA: NONREACTIVE
HCV RNA SERPL NAA+PROBE-ACNC: ABNORMAL IU/ML
HCV RNA SERPL NAA+PROBE-LOG IU: 6 {LOG_IU}/ML
HIV 1+2 AB+HIV1 P24 AG SERPL QL IA: NONREACTIVE
INTERPRETATION ECG - MUSE: NORMAL
P AXIS - MUSE: NORMAL DEGREES
PR INTERVAL - MUSE: 176 MS
QRS DURATION - MUSE: 82 MS
QT - MUSE: 352 MS
QTC - MUSE: 432 MS
R AXIS - MUSE: 56 DEGREES
SYSTOLIC BLOOD PRESSURE - MUSE: NORMAL MMHG
T AXIS - MUSE: 61 DEGREES
VENTRICULAR RATE- MUSE: 91 BPM

## 2022-04-11 PROCEDURE — 200N000002 HC R&B ICU UMMC

## 2022-04-11 ASSESSMENT — ACTIVITIES OF DAILY LIVING (ADL)
ADLS_ACUITY_SCORE: 24

## 2022-04-12 LAB
S100 CA BINDING PROTEIN B SER-MCNC: 1268 NG/L
S100 CA BINDING PROTEIN B SER-MCNC: 1823 NG/L

## 2022-04-12 PROCEDURE — 200N000002 HC R&B ICU UMMC

## 2022-04-12 ASSESSMENT — ACTIVITIES OF DAILY LIVING (ADL)
ADLS_ACUITY_SCORE: 24

## 2022-04-13 LAB — ACT BLD: 143 SECONDS (ref 74–150)

## 2022-04-13 PROCEDURE — 200N000002 HC R&B ICU UMMC

## 2022-04-13 ASSESSMENT — ACTIVITIES OF DAILY LIVING (ADL)
ADLS_ACUITY_SCORE: 24

## 2022-04-14 LAB
BACTERIA BLD CULT: NO GROWTH
BACTERIA BLD CULT: NO GROWTH

## 2022-04-14 PROCEDURE — 200N000002 HC R&B ICU UMMC

## 2022-04-14 ASSESSMENT — ACTIVITIES OF DAILY LIVING (ADL)
ADLS_ACUITY_SCORE: 24

## 2022-04-15 PROCEDURE — 200N000002 HC R&B ICU UMMC

## 2022-04-15 ASSESSMENT — ACTIVITIES OF DAILY LIVING (ADL)
ADLS_ACUITY_SCORE: 24

## 2022-04-16 PROCEDURE — 200N000002 HC R&B ICU UMMC

## 2022-04-16 ASSESSMENT — ACTIVITIES OF DAILY LIVING (ADL)
ADLS_ACUITY_SCORE: 24

## 2022-04-17 PROCEDURE — 200N000002 HC R&B ICU UMMC

## 2022-04-17 ASSESSMENT — ACTIVITIES OF DAILY LIVING (ADL)
ADLS_ACUITY_SCORE: 24

## 2022-04-18 LAB — S100 CA BINDING PROTEIN B SER-MCNC: 4650 NG/L

## 2022-04-18 PROCEDURE — 200N000002 HC R&B ICU UMMC

## 2022-04-18 ASSESSMENT — ACTIVITIES OF DAILY LIVING (ADL)
ADLS_ACUITY_SCORE: 24

## 2022-04-19 LAB
11OH-THC SPEC-MCNC: NEGATIVE NG/ML
7AMINOCLONAZEPAM SERPL-MCNC: NEGATIVE NG/ML
ALPRAZ SERPL-MCNC: NEGATIVE NG/ML
AMPHET BLD CFM-MCNC: ABNORMAL NG/ML
AMPHET SERPLBLD CFM-MCNC: 92 NG/ML
APAP BLD-MCNC: NEGATIVE UG/ML
BARBITURATES SPEC-MCNC: NEGATIVE UG/ML
BENZODIAZ SPEC QL: POSITIVE
BENZODIAZ SPEC-MCNC: ABNORMAL NG/ML
BUPRENORPHINE SERPL-MCNC: NEGATIVE NG/ML
BZE BLD CFM-MCNC: NEGATIVE NG/ML
CANNABIDIOL SERPLBLD CFM-MCNC: NEGATIVE NG/ML
CANNABINOIDS SERPL-MCNC: NEGATIVE NG/ML
CANNABINOIDS SPEC QL CFM: POSITIVE
CANNABINOL SERPLBLD CFM-MCNC: NEGATIVE NG/ML
CARBOXYTHC BLD-MCNC: ABNORMAL NG/ML
CARBOXYTHC SPEC-MCNC: PRESENT NG/ML
CARISOPRODOL IA: NEGATIVE UG/ML
CHLORDIAZEP SERPL-MCNC: NEGATIVE NG/ML
CLONAZEPAM SERPL-MCNC: NEGATIVE NG/ML
DECLARED MEDICATIONS: ABNORMAL
DESALKYLFLURAZ SERPL CFM-MCNC: NEGATIVE NG/ML
DIAZEPAM SERPL-MCNC: NEGATIVE NG/ML
DIETHYLPROPION SERPLBLD CFM-MCNC: NEGATIVE NG/ML
DRUGS FLD: ABNORMAL
EPHEDRIN SERPLBLD CFM-MCNC: NEGATIVE NG/ML
ETHANOL BLD-MCNC: NEGATIVE GM/DL
FENTANYL BLD CFM-MCNC: PRESENT NG/ML
FENTANYL IA: ABNORMAL NG/ML
FENTANYL SPEC QL: POSITIVE
FLURAZEPAM SPEC-MCNC: NEGATIVE NG/ML
GABAPENTIN IA: NEGATIVE UG/ML
LORAZEPAM SERPL-MCNC: NEGATIVE NG/ML
MDA SERPL-MCNC: NEGATIVE NG/ML
MDEA SERPL-MCNC: NEGATIVE NG/ML
MDMA SERPL-MCNC: NEGATIVE NG/ML
MEPERIDINE SERPLBLD-MCNC: NEGATIVE NG/ML
METHADONE SAL CFM-MCNC: NEGATIVE NG/ML
METHAMPHET SERPL-MCNC: 1349 NG/ML
MIDAZOLAM SERPL-MCNC: PRESENT NG/ML
NORCHLORDIAZEP SERPL-MCNC: NEGATIVE UG/ML
NORDIAZEPAM SPEC-MCNC: NEGATIVE NG/ML
NORFENTANYL BLD CFM-MCNC: 0.3 NG/ML
OPIATES SPEC-MCNC: NEGATIVE NG/ML
OXAZEPAM SERPL CFM-MCNC: NEGATIVE NG/ML
OXYCODONE SERPLBLD SCN-MCNC: NEGATIVE NG/ML
PCP SPEC-MCNC: NEGATIVE NG/ML
PHENDIMETRAZINE SERPLBLD CFM-MCNC: NEGATIVE NG/ML
PHENTERMINE SERPLBLD CFM-MCNC: NEGATIVE NG/ML
PPA SERPLBLD CFM-MCNC: NEGATIVE NG/ML
PROPOXYPH SPEC-MCNC: NEGATIVE NG/ML
PSEUDOEPHEDRINE SERPLBLD CFM-MCNC: NEGATIVE NG/ML
SYMPATHOMIMETICS SERPLBLD QL CFM: POSITIVE
TEMAZEPAM SERPL-MCNC: NEGATIVE NG/ML
TRAMADOL BLD-MCNC: NEGATIVE NG/ML
TRIAZOLAM SPEC-MCNC: NEGATIVE NG/ML

## 2022-04-19 PROCEDURE — 200N000002 HC R&B ICU UMMC

## 2022-04-19 ASSESSMENT — ACTIVITIES OF DAILY LIVING (ADL)
ADLS_ACUITY_SCORE: 24

## 2022-04-19 NOTE — PROGRESS NOTES
"CDI Query    Based on the indications below, please clarify if there was treatment for or clinical significance of any of the following diagnoses by documenting accordingly in the PROGRESS NOTES of the patient's clinical record.     Thrombocytopenia  Other (please specify)     Clinical Indicators found within the medical record:     Labs: Platelet Count: 259 > 69     Notes: \"Will need to monitor platelets closely since it is slowly downtrending\"     Tx: Lab monitoring    "

## 2022-04-20 PROCEDURE — 200N000002 HC R&B ICU UMMC

## 2022-04-20 ASSESSMENT — ACTIVITIES OF DAILY LIVING (ADL)
ADLS_ACUITY_SCORE: 24

## 2022-04-21 ASSESSMENT — ACTIVITIES OF DAILY LIVING (ADL)
ADLS_ACUITY_SCORE: 24

## 2022-08-10 LAB
ATRIAL RATE - MUSE: 62 BPM
DIASTOLIC BLOOD PRESSURE - MUSE: NORMAL MMHG
INTERPRETATION ECG - MUSE: NORMAL
P AXIS - MUSE: NORMAL DEGREES
PR INTERVAL - MUSE: 176 MS
QRS DURATION - MUSE: 108 MS
QT - MUSE: 528 MS
QTC - MUSE: 535 MS
R AXIS - MUSE: 77 DEGREES
SYSTOLIC BLOOD PRESSURE - MUSE: NORMAL MMHG
T AXIS - MUSE: 90 DEGREES
VENTRICULAR RATE- MUSE: 62 BPM

## 2024-04-08 NOTE — Clinical Note
IABP inserted in the left femoral artery. IABP inserted with 50 cc balloon volume  Quality 226: Preventive Care And Screening: Tobacco Use: Screening And Cessation Intervention: Patient screened for tobacco use and is an ex/non-smoker Quality 431: Preventive Care And Screening: Unhealthy Alcohol Use - Screening: Patient not identified as an unhealthy alcohol user when screened for unhealthy alcohol use using a systematic screening method Quality 130: Documentation Of Current Medications In The Medical Record: Current Medications Documented Detail Level: Detailed

## (undated) DEVICE — GOWN IMPERVIOUS BREATHABLE SMART XLG 89045

## (undated) DEVICE — SUCTION TIP POOLE K770

## (undated) DEVICE — ADPT 5 IN 1 360

## (undated) DEVICE — CANNULA VENOUS 25FR LONG

## (undated) DEVICE — CATH ANGIO SUPERTORQUE PLUS JL4 6FRX100CM 533620

## (undated) DEVICE — SU SILK 2-0 TIE 12X30" A305H

## (undated) DEVICE — INTRO SHEATH MICRO PLATINUM TIP 4FRX40CM 7274

## (undated) DEVICE — INSERT FOGARTY 86MM TRACTION DBL SAFEJAW DSAFE86

## (undated) DEVICE — DRAPE IOBAN INCISE 23X17" 6650EZ

## (undated) DEVICE — BLADE SAW STERNAL 20X30MM KM-32

## (undated) DEVICE — ESU ELEC BLADE 2.75" COATED/INSULATED E1455

## (undated) DEVICE — DRAPE SHEET REV FOLD 3/4 9349

## (undated) DEVICE — NDL BX TRU CUT 14GA 4.5" 2N2702X

## (undated) DEVICE — SU SILK 3-0 TIE 12X30" A304H

## (undated) DEVICE — KIT HAND CONTROL ACIST 014644 AR-P54

## (undated) DEVICE — Device

## (undated) DEVICE — DRAPE FLUID WARMING 52 X 60" ORS-321

## (undated) DEVICE — SU PROLENE 5-0 RB-1DA 36"  8556H

## (undated) DEVICE — BLADE SAW STRK STERNAL 6207-97-101

## (undated) DEVICE — SOL NACL 0.9% IRRIG 1000ML BOTTLE 2F7124

## (undated) DEVICE — SU SILK 1 TIE 6X30" A307H

## (undated) DEVICE — MANIFOLD KIT ANGIO AUTOMATED 014613

## (undated) DEVICE — CATH QUATTRO 9.3FR  FEM INSTRN

## (undated) DEVICE — INSERT FOGARTY 61MM TRACTION HYDRAJAW HYDRA61

## (undated) DEVICE — LINEN GOWN XLG 5407

## (undated) DEVICE — INTRO GLIDESHEATH SLENDER 6FR 10X45CM 60-1060

## (undated) DEVICE — LINEN TOWEL PACK X30 5481

## (undated) DEVICE — CLIP APPLIER 11" MED LIGACLIP MCM30

## (undated) DEVICE — SUCTION TIP YANKAUER W/O VENT K86

## (undated) DEVICE — NDL COUNTER 20CT 31142493

## (undated) DEVICE — SYR 10ML LL W/O NDL 302995

## (undated) DEVICE — DRSG TELFA 3X8" 1238

## (undated) DEVICE — BASIN SET SINGLE STERILE 13752-624

## (undated) DEVICE — INTRO SHEATH 8FRX10CM PINNACLE RSS802

## (undated) DEVICE — CATH ANGIO INFINITI 3DRC 6FRX100CM 534676T

## (undated) DEVICE — INTRODUCER SHEATH 4FRX40CM MICROPUNC PED G47946

## (undated) DEVICE — DRAPE BACK TABLE  44X90" 8377

## (undated) DEVICE — WIRE GUIDE 0.035"X145CM AMPLATZ XSTIFF J THSCF-35-145-3-AES

## (undated) DEVICE — SU ETHILON 2 LP 60" D7597

## (undated) DEVICE — INTRO SHEATH 6FRX25CM PINNACLE RSS606

## (undated) DEVICE — SPONGE LAP 18X18" X8435

## (undated) DEVICE — INTRO SHEATH 9FRX10CM PINNACLE RSS902

## (undated) DEVICE — CATH ANGIO SUPERTORQUE PLUS JR4 6FRX100CM 533621

## (undated) DEVICE — TUBING SUCTION 10'X3/16" N510

## (undated) DEVICE — 17FR 23CM ARTERIAL ECMO CANNULA WITH BIOLINE COATING

## (undated) DEVICE — BLADE KNIFE SURG 10 371110

## (undated) DEVICE — 8FR X 24CM SUPER ARROW-FLEX PERCUTANEOUS SHEATH INTRODUCER SET WITH INTEGRAL HEMOSTASIS VALVE/SIDE PORT AND RADIOPAQUE TIP MARKER BAND

## (undated) DEVICE — SU SILK 0 TIE 6X30" A306H

## (undated) DEVICE — PACK HEART LEFT CUSTOM

## (undated) DEVICE — LINEN TOWEL PACK X6 WHITE 5487

## (undated) RX ORDER — FENTANYL CITRATE 50 UG/ML
INJECTION, SOLUTION INTRAMUSCULAR; INTRAVENOUS
Status: DISPENSED
Start: 2022-01-01

## (undated) RX ORDER — NOREPINEPHRINE BITARTRATE 0.06 MG/ML
INJECTION, SOLUTION INTRAVENOUS
Status: DISPENSED
Start: 2022-01-01